# Patient Record
Sex: MALE | Race: BLACK OR AFRICAN AMERICAN | NOT HISPANIC OR LATINO | Employment: FULL TIME | URBAN - METROPOLITAN AREA
[De-identification: names, ages, dates, MRNs, and addresses within clinical notes are randomized per-mention and may not be internally consistent; named-entity substitution may affect disease eponyms.]

---

## 2022-12-16 ENCOUNTER — HOSPITAL ENCOUNTER (OUTPATIENT)
Dept: RADIOLOGY | Facility: HOSPITAL | Age: 52
Discharge: HOME | End: 2022-12-16
Attending: FAMILY MEDICINE

## 2022-12-16 ENCOUNTER — OFFICE VISIT (OUTPATIENT)
Dept: URGENT CARE | Facility: CLINIC | Age: 52
End: 2022-12-16

## 2022-12-16 VITALS
DIASTOLIC BLOOD PRESSURE: 83 MMHG | OXYGEN SATURATION: 100 % | RESPIRATION RATE: 20 BRPM | HEART RATE: 98 BPM | HEIGHT: 61 IN | SYSTOLIC BLOOD PRESSURE: 114 MMHG | TEMPERATURE: 99 F | WEIGHT: 150 LBS | BODY MASS INDEX: 28.32 KG/M2

## 2022-12-16 DIAGNOSIS — M25.562 ACUTE PAIN OF LEFT KNEE: Primary | ICD-10-CM

## 2022-12-16 DIAGNOSIS — M25.562 ACUTE PAIN OF LEFT KNEE: ICD-10-CM

## 2022-12-16 PROCEDURE — 63600175 PHARM REV CODE 636 W HCPCS: Performed by: FAMILY MEDICINE

## 2022-12-16 PROCEDURE — 99205 OFFICE O/P NEW HI 60 MIN: CPT | Mod: PBBFAC | Performed by: FAMILY MEDICINE

## 2022-12-16 PROCEDURE — 99204 PR OFFICE/OUTPT VISIT, NEW, LEVL IV, 45-59 MIN: ICD-10-PCS | Mod: S$PBB,,, | Performed by: FAMILY MEDICINE

## 2022-12-16 PROCEDURE — 99204 OFFICE O/P NEW MOD 45 MIN: CPT | Mod: S$PBB,,, | Performed by: FAMILY MEDICINE

## 2022-12-16 PROCEDURE — 73562 X-RAY EXAM OF KNEE 3: CPT | Mod: 26,LT,, | Performed by: RADIOLOGY

## 2022-12-16 PROCEDURE — 73562 XR KNEE 3 VIEW LEFT: ICD-10-PCS | Mod: 26,LT,, | Performed by: RADIOLOGY

## 2022-12-16 PROCEDURE — 73562 X-RAY EXAM OF KNEE 3: CPT | Mod: TC,LT

## 2022-12-16 RX ORDER — KETOROLAC TROMETHAMINE 30 MG/ML
60 INJECTION, SOLUTION INTRAMUSCULAR; INTRAVENOUS
Status: COMPLETED | OUTPATIENT
Start: 2022-12-16 | End: 2022-12-16

## 2022-12-16 RX ORDER — DICLOFENAC SODIUM 75 MG/1
75 TABLET, DELAYED RELEASE ORAL 2 TIMES DAILY
Qty: 30 TABLET | Refills: 1 | Status: SHIPPED | OUTPATIENT
Start: 2022-12-16 | End: 2023-06-15

## 2022-12-16 RX ADMIN — KETOROLAC TROMETHAMINE 60 MG: 60 INJECTION, SOLUTION INTRAMUSCULAR at 12:12

## 2022-12-16 NOTE — PROGRESS NOTES
"Subjective:       Patient ID: Flako Power is a 52 y.o. male.    Vitals:  height is 5' 1.02" (1.55 m) and weight is 68 kg (150 lb). His oral temperature is 98.8 °F (37.1 °C). His blood pressure is 114/83 and his pulse is 98. His respiration is 20 and oxygen saturation is 100%.     Chief Complaint: Injury (Entered by patient) and Knee Injury (States was working out and hurt knee x wed)    Injury    Knee Injury      52-year-old male presents urgent care with left knee pain after working out, mostly anterior.  Hurts to bend it.  No locking or popping.  Possible previous injury to the knee.    Denies fever or chills, no systemic symptoms.  No ENT or cardiorespiratory symptoms.  No other joints bother him.  ROS    Constitutional: negative except as stated in HPI  Eye: negative except as stated in HPI  ENT: negative except as stated in HPI  Respiratory: negative except as stated in HPI  Cardiovascular: negative except as stated in HPI  Gastrointestinal: negative except as stated in HPI  Genitourinary: negative except as stated in HPI  Objective:    Repeat temperature 98.8°  Physical Exam   Constitutional: He appears well-developed.  Non-toxic appearance. He does not appear ill. No distress.   Musculoskeletal:      Right hip: He exhibits tenderness (tenderness right upper buttock).      Left hip: Normal.      Right knee: Normal.      Left knee: He exhibits decreased range of motion. He exhibits no swelling, no effusion, no ecchymosis, no deformity, no erythema, normal alignment, normal patellar mobility and normal meniscus. Tenderness (pre patella bursa) found.      Right upper leg: Normal.      Left upper leg: Normal.      Left lower leg: Normal.   Nursing note and vitals reviewed.    XR left knee-no acute changes  Assessment:       1. Acute pain of left knee            Plan:         Acute pain of left knee  -     XR KNEE 3 VIEW LEFT; Future; Expected date: 12/16/2022  -     ketorolac injection 60 mg  -     " diclofenac (VOLTAREN) 75 MG EC tablet; Take 1 tablet (75 mg total) by mouth 2 (two) times daily.  Dispense: 30 tablet; Refill: 1         Discussed x-ray results.  Will notify if radiology interpretation is different.  Consider ice, rest and elevate as much as possible.  Toradol IM today.  May begin diclofenac tonight with food.  Follow-up with PCP or return to urgent care if need

## 2022-12-20 ENCOUNTER — HOSPITAL ENCOUNTER (EMERGENCY)
Facility: HOSPITAL | Age: 52
Discharge: HOME | End: 2022-12-20
Attending: EMERGENCY MEDICINE

## 2022-12-20 VITALS
RESPIRATION RATE: 16 BRPM | TEMPERATURE: 98 F | DIASTOLIC BLOOD PRESSURE: 81 MMHG | SYSTOLIC BLOOD PRESSURE: 126 MMHG | BODY MASS INDEX: 30.21 KG/M2 | HEART RATE: 93 BPM | HEIGHT: 61 IN | WEIGHT: 160 LBS | OXYGEN SATURATION: 100 %

## 2022-12-20 DIAGNOSIS — M25.562 LEFT KNEE PAIN, UNSPECIFIED CHRONICITY: ICD-10-CM

## 2022-12-20 DIAGNOSIS — S39.012A LUMBAR STRAIN, INITIAL ENCOUNTER: Primary | ICD-10-CM

## 2022-12-20 PROCEDURE — 99284 EMERGENCY DEPT VISIT MOD MDM: CPT

## 2022-12-20 PROCEDURE — 25000003 PHARM REV CODE 250: Performed by: NURSE PRACTITIONER

## 2022-12-20 RX ORDER — INDOMETHACIN 25 MG/1
25 CAPSULE ORAL 2 TIMES DAILY PRN
Qty: 14 CAPSULE | Refills: 0 | Status: SHIPPED | OUTPATIENT
Start: 2022-12-20 | End: 2022-12-27

## 2022-12-20 RX ORDER — BACLOFEN 10 MG/1
10 TABLET ORAL 3 TIMES DAILY PRN
Qty: 21 TABLET | Refills: 0 | Status: SHIPPED | OUTPATIENT
Start: 2022-12-20 | End: 2023-06-15

## 2022-12-20 RX ORDER — KETOROLAC TROMETHAMINE 10 MG/1
10 TABLET, FILM COATED ORAL
Status: COMPLETED | OUTPATIENT
Start: 2022-12-20 | End: 2022-12-20

## 2022-12-20 RX ORDER — TRAMADOL HYDROCHLORIDE 50 MG/1
50 TABLET ORAL EVERY 12 HOURS PRN
Qty: 10 TABLET | Refills: 0 | Status: SHIPPED | OUTPATIENT
Start: 2022-12-20 | End: 2022-12-25

## 2022-12-20 RX ORDER — METHOCARBAMOL 500 MG/1
1000 TABLET, FILM COATED ORAL
Status: COMPLETED | OUTPATIENT
Start: 2022-12-20 | End: 2022-12-20

## 2022-12-20 RX ADMIN — METHOCARBAMOL 1000 MG: 500 TABLET ORAL at 10:12

## 2022-12-20 RX ADMIN — KETOROLAC TROMETHAMINE 10 MG: 10 TABLET, FILM COATED ORAL at 10:12

## 2022-12-20 NOTE — ED PROVIDER NOTES
Encounter Date: 12/20/2022       History     Chief Complaint   Patient presents with    Back Pain    Knee Pain     CO LT KNEE AND BSCK PAIN X 1 WK.  DENIES INJURY.  SEEN IN  NO IMPROVEMENT W RX MEDS.       Pt is a 52 y.o. male who presents to the [facility] ED complaining of low back and knee pain x 1 week. Pt initially seen for issue on 12/16 at the [facility] but denies relief with prescribed medication. 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 Denies chest pain, SOB, weakness, dizziness, fever, or loss of bowel or bladder control. Says initial pain was in his knee but now he feels pain in his lower back also.    Review of patient's allergies indicates:  No Known Allergies  No past medical history on file.  Past Surgical History:   Procedure Laterality Date    TONSILLECTOMY       Family History   Problem Relation Age of Onset    Diabetes type I Mother     Breast cancer Mother     Hypertension Mother     No Known Problems Father      Social History     Tobacco Use    Smoking status: Never    Smokeless tobacco: Never   Substance Use Topics    Alcohol use: Never    Drug use: Never     Review of Systems   Constitutional:  Negative for chills, diaphoresis, fatigue and fever.   HENT:  Negative for facial swelling, postnasal drip, rhinorrhea, sinus pressure, sinus pain, sore throat and trouble swallowing.    Respiratory:  Negative for cough, chest tightness, shortness of breath and wheezing.    Cardiovascular:  Negative for chest pain, palpitations and leg swelling.   Gastrointestinal:  Negative for abdominal pain, diarrhea, nausea and vomiting.   Genitourinary:  Negative for dysuria, flank pain, hematuria and urgency.   Musculoskeletal:  Positive for arthralgias and back pain. Negative for myalgias.   Skin:  Negative for color change and rash.   Neurological:  Negative for dizziness, syncope, weakness and headaches.   Hematological:  Does not bruise/bleed easily.   All other systems  reviewed and are negative.    Physical Exam     Initial Vitals [12/20/22 1033]   BP Pulse Resp Temp SpO2   126/81 93 16 97.9 °F (36.6 °C) 100 %      MAP       --         Physical Exam    Nursing note and vitals reviewed.  Constitutional: Vital signs are normal. He appears well-developed and well-nourished.   HENT:   Head: Normocephalic.   Nose: Nose normal.   Mouth/Throat: Oropharynx is clear and moist.   Eyes: Conjunctivae and EOM are normal. Pupils are equal, round, and reactive to light.   Neck: Neck supple.   Normal range of motion.  Cardiovascular:  Normal rate, regular rhythm, normal heart sounds and intact distal pulses.           Pulmonary/Chest: Effort normal and breath sounds normal. No respiratory distress. He has no wheezes. He has no rhonchi. He has no rales. He exhibits no tenderness.   Abdominal: Abdomen is soft and flat. Bowel sounds are normal. There is no abdominal tenderness. There is no rebound, no guarding, no tenderness at McBurney's point and negative Wooten's sign.   Musculoskeletal:         General: Normal range of motion.      Cervical back: Normal range of motion and neck supple.      Lumbar back: Spasms and tenderness present. No swelling, edema or deformity. Normal range of motion.        Back:       Left knee: No swelling or effusion. Normal range of motion. Tenderness present. Normal pulse.        Legs:      Neurological: He is alert and oriented to person, place, and time. He has normal strength.   Skin: Skin is warm and dry. Capillary refill takes less than 2 seconds.   Psychiatric: He has a normal mood and affect. His behavior is normal. Judgment and thought content normal.       ED Course   Procedures  Labs Reviewed - No data to display       Imaging Results              X-Ray Lumbar Spine Ap And Lateral (Preliminary result)  Result time 12/20/22 11:43:21      ED Interpretation by JOSE Arce Jr. (12/20/22 11:43:21, [facility], Emergency Medicine)     Degenerative changes visualized. No acute fracture noted.                                     Medications   methocarbamoL tablet 1,000 mg (1,000 mg Oral Given 12/20/22 1056)   ketorolac tablet 10 mg (10 mg Oral Given 12/20/22 1056)     Medical Decision Making:   Differential Diagnosis:   Muscle strain  DDD  Fracture  Clinical Tests:   Radiological Study: Ordered and Reviewed  ED Management:  11:43 AM Reassessed patient at this time. Reports condition has improved. Discussed with patient all pertinent ED information and results. Discussed diagnosis and treatment plan with patient. Follow up instructions and return to ED instruction have been given. All questions and concerns were addressed at this time. Patient voices understanding of information and instructions. Patient is comfortable with plan and discharge. Patient is stable for discharge.                           Clinical Impression:   Final diagnoses:  [S39.012A] Lumbar strain, initial encounter (Primary)  [M25.562] Left knee pain, unspecified chronicity        ED Disposition Condition    Discharge Stable          ED Prescriptions       Medication Sig Dispense Start Date End Date Auth. Provider    baclofen (LIORESAL) 10 MG tablet Take 1 tablet (10 mg total) by mouth 3 (three) times daily as needed (muscle spasms). 21 tablet 12/20/2022 12/27/2022 Garfield Echeverria Jr., LEILAP    indomethacin (INDOCIN) 25 MG capsule Take 1 capsule (25 mg total) by mouth 2 (two) times daily as needed (pain). 14 capsule 12/20/2022 12/27/2022 LEILA Arce Jr.P    traMADoL (ULTRAM) 50 mg tablet Take 1 tablet (50 mg total) by mouth every 12 (twelve) hours as needed for Pain. 10 tablet 12/20/2022 12/25/2022 JOSE Arce Jr.          Follow-up Information       Follow up With Specialties Details Why Contact Info    [facility] Emergency Medicine In 3 days As needed, If symptoms worsen [address] [address] [phone]              Garfield Echeverria Jr., [facility]  12/20/22 1141

## 2022-12-20 NOTE — Clinical Note
Luanne Muniz accompanied their father to the emergency department on 12/20/2022. They may return to work on 12/21/2022.      If you have any questions or concerns, please don't hesitate to call.       RN

## 2022-12-20 NOTE — Clinical Note
"Flako Russsunil" Tono was seen and treated in our emergency department on 12/20/2022.  He may return to work on 12/24/2022.       If you have any questions or concerns, please don't hesitate to call.      Garfield Echeverria Jr., FNP"

## 2022-12-20 NOTE — Clinical Note
Joaquina Power accompanied their father to the emergency department on 12/20/2022. They may return to work on 12/21/2022.      If you have any questions or concerns, please don't hesitate to call.       RN

## 2023-01-16 ENCOUNTER — HOSPITAL ENCOUNTER (EMERGENCY)
Facility: HOSPITAL | Age: 53
Discharge: OTHER INSTITUTION | End: 2023-01-16
Attending: INTERNAL MEDICINE

## 2023-01-16 ENCOUNTER — HOSPITAL ENCOUNTER (INPATIENT)
Facility: HOSPITAL | Age: 53
LOS: 9 days | Discharge: HOME HEALTH | DRG: 871 | End: 2023-01-25
Attending: INTERNAL MEDICINE | Admitting: INTERNAL MEDICINE
Payer: MEDICAID

## 2023-01-16 VITALS
BODY MASS INDEX: 24.1 KG/M2 | OXYGEN SATURATION: 98 % | WEIGHT: 127.63 LBS | RESPIRATION RATE: 22 BRPM | HEART RATE: 87 BPM | TEMPERATURE: 99 F | HEIGHT: 61 IN | DIASTOLIC BLOOD PRESSURE: 87 MMHG | SYSTOLIC BLOOD PRESSURE: 145 MMHG

## 2023-01-16 DIAGNOSIS — I33.0 INFECTIVE ENDOCARDITIS OF MITRAL VALVE: ICD-10-CM

## 2023-01-16 DIAGNOSIS — R07.9 CHEST PAIN: ICD-10-CM

## 2023-01-16 DIAGNOSIS — M46.40 DISCITIS: ICD-10-CM

## 2023-01-16 DIAGNOSIS — I34.0 SEVERE MITRAL VALVE REGURGITATION: Primary | ICD-10-CM

## 2023-01-16 DIAGNOSIS — R78.81 BACTEREMIA: ICD-10-CM

## 2023-01-16 DIAGNOSIS — R05.9 COUGH: ICD-10-CM

## 2023-01-16 DIAGNOSIS — I25.10 CAD (CORONARY ARTERY DISEASE): ICD-10-CM

## 2023-01-16 DIAGNOSIS — I38 ENDOCARDITIS: ICD-10-CM

## 2023-01-16 DIAGNOSIS — I38 ENDOCARDITIS, UNSPECIFIED CHRONICITY, UNSPECIFIED ENDOCARDITIS TYPE: ICD-10-CM

## 2023-01-16 DIAGNOSIS — A41.9 SEPSIS, DUE TO UNSPECIFIED ORGANISM, UNSPECIFIED WHETHER ACUTE ORGAN DYSFUNCTION PRESENT: Primary | ICD-10-CM

## 2023-01-16 DIAGNOSIS — M46.24 OSTEOMYELITIS OF VERTEBRA OF THORACIC REGION: ICD-10-CM

## 2023-01-16 LAB
ALBUMIN SERPL-MCNC: 3.1 G/DL (ref 3.5–5)
ALBUMIN/GLOB SERPL: 0.6 RATIO (ref 1.1–2)
ALP SERPL-CCNC: 85 UNIT/L (ref 40–150)
ALT SERPL-CCNC: 31 UNIT/L (ref 0–55)
APPEARANCE UR: CLEAR
AST SERPL-CCNC: 25 UNIT/L (ref 5–34)
BACTERIA #/AREA URNS AUTO: ABNORMAL /HPF
BASOPHILS # BLD AUTO: 0.03 X10(3)/MCL (ref 0–0.2)
BASOPHILS NFR BLD AUTO: 0.2 %
BILIRUB UR QL STRIP.AUTO: NEGATIVE MG/DL
BILIRUBIN DIRECT+TOT PNL SERPL-MCNC: 0.6 MG/DL
BUN SERPL-MCNC: 11.8 MG/DL (ref 8.4–25.7)
CALCIUM SERPL-MCNC: 9.6 MG/DL (ref 8.4–10.2)
CHLORIDE SERPL-SCNC: 102 MMOL/L (ref 98–107)
CO2 SERPL-SCNC: 26 MMOL/L (ref 22–29)
COLOR UR AUTO: YELLOW
CREAT SERPL-MCNC: 1.19 MG/DL (ref 0.73–1.18)
CRP SERPL-MCNC: 89.8 MG/L
EOSINOPHIL # BLD AUTO: 0 X10(3)/MCL (ref 0–0.9)
EOSINOPHIL NFR BLD AUTO: 0 %
ERYTHROCYTE [DISTWIDTH] IN BLOOD BY AUTOMATED COUNT: 13.8 % (ref 11.5–17)
ERYTHROCYTE [SEDIMENTATION RATE] IN BLOOD: 63 MM/HR (ref 0–15)
FLUAV AG UPPER RESP QL IA.RAPID: NOT DETECTED
FLUBV AG UPPER RESP QL IA.RAPID: NOT DETECTED
GFR SERPLBLD CREATININE-BSD FMLA CKD-EPI: >60 MLS/MIN/1.73/M2
GLOBULIN SER-MCNC: 5.4 GM/DL (ref 2.4–3.5)
GLUCOSE SERPL-MCNC: 100 MG/DL (ref 74–100)
GLUCOSE UR QL STRIP.AUTO: NORMAL MG/DL
HCT VFR BLD AUTO: 34.4 % (ref 42–52)
HGB BLD-MCNC: 11.3 GM/DL (ref 14–18)
HYALINE CASTS #/AREA URNS LPF: ABNORMAL /LPF
IMM GRANULOCYTES # BLD AUTO: 0.03 X10(3)/MCL (ref 0–0.04)
IMM GRANULOCYTES NFR BLD AUTO: 0.2 %
KETONES UR QL STRIP.AUTO: ABNORMAL MG/DL
LACTATE SERPL-SCNC: 1.3 MMOL/L (ref 0.5–2.2)
LEUKOCYTE ESTERASE UR QL STRIP.AUTO: NEGATIVE UNIT/L
LYMPHOCYTES # BLD AUTO: 0.9 X10(3)/MCL (ref 0.6–4.6)
LYMPHOCYTES NFR BLD AUTO: 7.3 %
MCH RBC QN AUTO: 27.4 PG
MCHC RBC AUTO-ENTMCNC: 32.8 MG/DL (ref 33–36)
MCV RBC AUTO: 83.3 FL (ref 80–94)
MONOCYTES # BLD AUTO: 0.8 X10(3)/MCL (ref 0.1–1.3)
MONOCYTES NFR BLD AUTO: 6.5 %
MUCOUS THREADS URNS QL MICRO: ABNORMAL /LPF
NEUTROPHILS # BLD AUTO: 10.54 X10(3)/MCL (ref 2.1–9.2)
NEUTROPHILS NFR BLD AUTO: 85.8 %
NITRITE UR QL STRIP.AUTO: NEGATIVE
NRBC BLD AUTO-RTO: 0 %
PH UR STRIP.AUTO: 5.5 [PH]
PLATELET # BLD AUTO: 242 X10(3)/MCL (ref 130–400)
PMV BLD AUTO: 9.9 FL (ref 7.4–10.4)
POTASSIUM SERPL-SCNC: 4.2 MMOL/L (ref 3.5–5.1)
PROT SERPL-MCNC: 8.5 GM/DL (ref 6.4–8.3)
PROT UR QL STRIP.AUTO: ABNORMAL MG/DL
RBC # BLD AUTO: 4.13 X10(6)/MCL (ref 4.7–6.1)
RBC #/AREA URNS AUTO: ABNORMAL /HPF
RBC UR QL AUTO: ABNORMAL UNIT/L
SARS-COV-2 RNA RESP QL NAA+PROBE: NOT DETECTED
SODIUM SERPL-SCNC: 138 MMOL/L (ref 136–145)
SP GR UR STRIP.AUTO: 1.02
SQUAMOUS #/AREA URNS LPF: ABNORMAL /HPF
UROBILINOGEN UR STRIP-ACNC: NORMAL MG/DL
WBC # SPEC AUTO: 12.3 X10(3)/MCL (ref 4.5–11.5)
WBC #/AREA URNS AUTO: ABNORMAL /HPF

## 2023-01-16 PROCEDURE — 96367 TX/PROPH/DG ADDL SEQ IV INF: CPT

## 2023-01-16 PROCEDURE — 96365 THER/PROPH/DIAG IV INF INIT: CPT

## 2023-01-16 PROCEDURE — 87040 BLOOD CULTURE FOR BACTERIA: CPT | Performed by: INTERNAL MEDICINE

## 2023-01-16 PROCEDURE — 83605 ASSAY OF LACTIC ACID: CPT | Performed by: PHYSICIAN ASSISTANT

## 2023-01-16 PROCEDURE — 25500020 PHARM REV CODE 255: Performed by: INTERNAL MEDICINE

## 2023-01-16 PROCEDURE — 25000003 PHARM REV CODE 250: Performed by: PHYSICIAN ASSISTANT

## 2023-01-16 PROCEDURE — 80053 COMPREHEN METABOLIC PANEL: CPT | Performed by: PHYSICIAN ASSISTANT

## 2023-01-16 PROCEDURE — 99291 CRITICAL CARE FIRST HOUR: CPT | Mod: 25

## 2023-01-16 PROCEDURE — 0240U COVID/FLU A&B PCR: CPT | Performed by: PHYSICIAN ASSISTANT

## 2023-01-16 PROCEDURE — 25000003 PHARM REV CODE 250: Performed by: INTERNAL MEDICINE

## 2023-01-16 PROCEDURE — 11000001 HC ACUTE MED/SURG PRIVATE ROOM

## 2023-01-16 PROCEDURE — 85651 RBC SED RATE NONAUTOMATED: CPT | Performed by: PHYSICIAN ASSISTANT

## 2023-01-16 PROCEDURE — 63600175 PHARM REV CODE 636 W HCPCS: Performed by: INTERNAL MEDICINE

## 2023-01-16 PROCEDURE — 87077 CULTURE AEROBIC IDENTIFY: CPT | Performed by: INTERNAL MEDICINE

## 2023-01-16 PROCEDURE — 81001 URINALYSIS AUTO W/SCOPE: CPT | Performed by: PHYSICIAN ASSISTANT

## 2023-01-16 PROCEDURE — 86140 C-REACTIVE PROTEIN: CPT | Performed by: PHYSICIAN ASSISTANT

## 2023-01-16 PROCEDURE — 85025 COMPLETE CBC W/AUTO DIFF WBC: CPT | Performed by: PHYSICIAN ASSISTANT

## 2023-01-16 RX ORDER — SIMETHICONE 80 MG
1 TABLET,CHEWABLE ORAL 4 TIMES DAILY PRN
Status: DISCONTINUED | OUTPATIENT
Start: 2023-01-17 | End: 2023-01-25 | Stop reason: HOSPADM

## 2023-01-16 RX ORDER — ACETAMINOPHEN 500 MG
1000 TABLET ORAL
Status: COMPLETED | OUTPATIENT
Start: 2023-01-16 | End: 2023-01-16

## 2023-01-16 RX ORDER — PROCHLORPERAZINE EDISYLATE 5 MG/ML
5 INJECTION INTRAMUSCULAR; INTRAVENOUS EVERY 6 HOURS PRN
Status: DISCONTINUED | OUTPATIENT
Start: 2023-01-17 | End: 2023-01-25 | Stop reason: HOSPADM

## 2023-01-16 RX ORDER — ONDANSETRON 2 MG/ML
4 INJECTION INTRAMUSCULAR; INTRAVENOUS EVERY 4 HOURS PRN
Status: DISCONTINUED | OUTPATIENT
Start: 2023-01-17 | End: 2023-01-25 | Stop reason: HOSPADM

## 2023-01-16 RX ORDER — MORPHINE SULFATE 4 MG/ML
2 INJECTION, SOLUTION INTRAMUSCULAR; INTRAVENOUS EVERY 4 HOURS PRN
Status: DISCONTINUED | OUTPATIENT
Start: 2023-01-16 | End: 2023-01-25 | Stop reason: HOSPADM

## 2023-01-16 RX ORDER — ACETAMINOPHEN 325 MG/1
650 TABLET ORAL EVERY 4 HOURS PRN
Status: DISCONTINUED | OUTPATIENT
Start: 2023-01-17 | End: 2023-01-25 | Stop reason: HOSPADM

## 2023-01-16 RX ORDER — AMOXICILLIN 250 MG
2 CAPSULE ORAL 2 TIMES DAILY PRN
Status: DISCONTINUED | OUTPATIENT
Start: 2023-01-17 | End: 2023-01-25 | Stop reason: HOSPADM

## 2023-01-16 RX ORDER — HYDROCODONE BITARTRATE AND ACETAMINOPHEN 5; 325 MG/1; MG/1
1 TABLET ORAL EVERY 6 HOURS PRN
Status: DISCONTINUED | OUTPATIENT
Start: 2023-01-16 | End: 2023-01-25 | Stop reason: HOSPADM

## 2023-01-16 RX ORDER — METHOCARBAMOL 500 MG/1
500 TABLET, FILM COATED ORAL 4 TIMES DAILY PRN
Status: DISCONTINUED | OUTPATIENT
Start: 2023-01-17 | End: 2023-01-25 | Stop reason: HOSPADM

## 2023-01-16 RX ORDER — POLYETHYLENE GLYCOL 3350 17 G/17G
17 POWDER, FOR SOLUTION ORAL 2 TIMES DAILY PRN
Status: DISCONTINUED | OUTPATIENT
Start: 2023-01-17 | End: 2023-01-25 | Stop reason: HOSPADM

## 2023-01-16 RX ORDER — MAG HYDROX/ALUMINUM HYD/SIMETH 200-200-20
30 SUSPENSION, ORAL (FINAL DOSE FORM) ORAL 4 TIMES DAILY PRN
Status: DISCONTINUED | OUTPATIENT
Start: 2023-01-17 | End: 2023-01-25 | Stop reason: HOSPADM

## 2023-01-16 RX ORDER — TALC
6 POWDER (GRAM) TOPICAL NIGHTLY PRN
Status: DISCONTINUED | OUTPATIENT
Start: 2023-01-17 | End: 2023-01-25 | Stop reason: HOSPADM

## 2023-01-16 RX ORDER — METHOCARBAMOL 500 MG/1
500 TABLET, FILM COATED ORAL
Status: COMPLETED | OUTPATIENT
Start: 2023-01-16 | End: 2023-01-16

## 2023-01-16 RX ORDER — SODIUM CHLORIDE, SODIUM LACTATE, POTASSIUM CHLORIDE, CALCIUM CHLORIDE 600; 310; 30; 20 MG/100ML; MG/100ML; MG/100ML; MG/100ML
INJECTION, SOLUTION INTRAVENOUS CONTINUOUS
Status: DISCONTINUED | OUTPATIENT
Start: 2023-01-17 | End: 2023-01-19

## 2023-01-16 RX ORDER — ACETAMINOPHEN 500 MG
1000 TABLET ORAL EVERY 6 HOURS PRN
Status: DISCONTINUED | OUTPATIENT
Start: 2023-01-17 | End: 2023-01-25 | Stop reason: HOSPADM

## 2023-01-16 RX ORDER — SODIUM CHLORIDE 0.9 % (FLUSH) 0.9 %
10 SYRINGE (ML) INJECTION
Status: DISCONTINUED | OUTPATIENT
Start: 2023-01-17 | End: 2023-01-25 | Stop reason: HOSPADM

## 2023-01-16 RX ADMIN — CEFTRIAXONE SODIUM 2 G: 2 INJECTION, POWDER, FOR SOLUTION INTRAMUSCULAR; INTRAVENOUS at 02:01

## 2023-01-16 RX ADMIN — IOHEXOL 150 ML: 350 INJECTION, SOLUTION INTRAVENOUS at 03:01

## 2023-01-16 RX ADMIN — METHOCARBAMOL 500 MG: 500 TABLET ORAL at 11:01

## 2023-01-16 RX ADMIN — VANCOMYCIN HYDROCHLORIDE 1000 MG: 1 INJECTION, POWDER, LYOPHILIZED, FOR SOLUTION INTRAVENOUS at 04:01

## 2023-01-16 RX ADMIN — ACETAMINOPHEN 1000 MG: 500 TABLET ORAL at 11:01

## 2023-01-16 NOTE — Clinical Note
The catheter was inserted into the, was removed from the and was inserted over the wire into the ostium   left main. Hemodynamics were performed.  An angiography was performed of the left coronary arteries. The angiography was performed via power injection.

## 2023-01-16 NOTE — Clinical Note
The catheter was removed from the and was inserted over the wire into the. Hemodynamics were performed.  An angiography was performed of the right coronary arteries. The angiography was performed via power injection.

## 2023-01-16 NOTE — Clinical Note
The catheter was inserted into the, was removed from the and was inserted over the wire into the ostium   left main. Hemodynamics were performed.  An angiography was performed of the left coronary arteries. JL4 catheter removed and upsized sheath and catheters for better visualization

## 2023-01-16 NOTE — Clinical Note
Luanne Muniz accompanied their father to the emergency department on 1/16/2023. They may return to work on 01/17/2023.      If you have any questions or concerns, please don't hesitate to call.      Speedy Murillo RN

## 2023-01-16 NOTE — ED PROVIDER NOTES
Encounter Date: 1/16/2023       History     Chief Complaint   Patient presents with    Back Pain     Right mid and lower back pain worse with coughing x1 week. Febrile 102.7, denies SOB.     Flako Power is a 52 y.o. male who presents to the ED complaining of coughing and low back pain. Patient reports having right lower back pain with spasms for a while now, however he started coughing approximately 1 week ago and it has made his back pain much worse. He denies any falls, trauma, injury. No known sick contacts.    The history is provided by the patient. No  was used.   Review of patient's allergies indicates:  No Known Allergies  No past medical history on file.  No past surgical history on file.  No family history on file.     Review of Systems   Constitutional:  Positive for fever. Negative for activity change and chills.   HENT:  Negative for congestion and trouble swallowing.    Eyes:  Negative for photophobia and visual disturbance.   Respiratory:  Positive for cough. Negative for chest tightness, shortness of breath and wheezing.    Cardiovascular:  Negative for chest pain, palpitations and leg swelling.   Gastrointestinal:  Negative for abdominal pain, constipation, diarrhea, nausea and vomiting.   Genitourinary:  Negative for dysuria, frequency, hematuria and urgency.   Musculoskeletal:  Positive for back pain. Negative for arthralgias and gait problem.   Skin:  Negative for color change and rash.   Neurological:  Negative for dizziness, syncope, weakness, light-headedness, numbness and headaches.   Psychiatric/Behavioral:  Negative for agitation and confusion. The patient is not nervous/anxious.      Physical Exam     Initial Vitals [01/16/23 1029]   BP Pulse Resp Temp SpO2   (!) 163/92 (!) 112 18 (!) 102.7 °F (39.3 °C) 99 %      MAP       --         Physical Exam    Nursing note and vitals reviewed.  Constitutional: He appears well-developed and well-nourished. No distress.    HENT:   Head: Normocephalic and atraumatic.   Mouth/Throat: No oropharyngeal exudate.   Eyes: EOM are normal. No scleral icterus.   Neck: Neck supple.   Normal range of motion.  Cardiovascular:  Normal rate and regular rhythm.           Murmur heard.  Pulmonary/Chest: No respiratory distress. He has no wheezes.   Abdominal: Abdomen is soft. He exhibits no distension. There is no abdominal tenderness.   Musculoskeletal:         General: Tenderness (right lumbar paraspinal muscles) present. No edema. Normal range of motion.      Cervical back: Normal range of motion and neck supple.      Comments: No bony C/T/L spinal tenderness. Ambulating without difficulty.      Neurological: He is alert and oriented to person, place, and time. No cranial nerve deficit.   Skin: Skin is warm and dry. Capillary refill takes less than 2 seconds. No erythema.   Psychiatric: He has a normal mood and affect. Thought content normal.       ED Course   Critical Care    Date/Time: 1/16/2023 4:42 PM  Performed by: Nilesh Parish MD  Authorized by: Nilesh Parish MD   Direct patient critical care time: 12 minutes  Additional history critical care time: 5 minutes  Ordering / reviewing critical care time: 15 minutes  Documentation critical care time: 8 minutes  Consulting other physicians critical care time: 5 minutes  Total critical care time (exclusive of procedural time) : 45 minutes  Critical care was necessary to treat or prevent imminent or life-threatening deterioration of the following conditions: sepsis.  Critical care was time spent personally by me on the following activities: development of treatment plan with patient or surrogate, discussions with consultants, interpretation of cardiac output measurements, evaluation of patient's response to treatment, examination of patient, obtaining history from patient or surrogate, ordering and performing treatments and interventions, ordering and review of  laboratory studies, ordering and review of radiographic studies, pulse oximetry, re-evaluation of patient's condition and review of old charts.      Labs Reviewed   COMPREHENSIVE METABOLIC PANEL - Abnormal; Notable for the following components:       Result Value    Creatinine 1.19 (*)     Protein Total 8.5 (*)     Albumin Level 3.1 (*)     Globulin 5.4 (*)     Albumin/Globulin Ratio 0.6 (*)     All other components within normal limits   URINALYSIS, REFLEX TO URINE CULTURE - Abnormal; Notable for the following components:    Protein, UA Trace (*)     Ketones, UA 1+ (*)     Blood, UA Trace (*)     Mucous, UA Trace (*)     All other components within normal limits   CBC WITH DIFFERENTIAL - Abnormal; Notable for the following components:    WBC 12.3 (*)     RBC 4.13 (*)     Hgb 11.3 (*)     Hct 34.4 (*)     MCHC 32.8 (*)     Neut # 10.54 (*)     All other components within normal limits   SEDIMENTATION RATE, AUTOMATED - Abnormal; Notable for the following components:    Sed Rate 63 (*)     All other components within normal limits   C-REACTIVE PROTEIN - Abnormal; Notable for the following components:    C-Reactive Protein 89.80 (*)     All other components within normal limits   COVID/FLU A&B PCR - Normal    Narrative:     The Xpert Xpress SARS-CoV-2/FLU/RSV plus is a rapid, multiplexed real-time PCR test intended for the simultaneous qualitative detection and differentiation of SARS-CoV-2, Influenza A, Influenza B, and respiratory syncytial virus (RSV) viral RNA in either nasopharyngeal swab or nasal swab specimens.         LACTIC ACID, PLASMA - Normal   BLOOD CULTURE [facility]   BLOOD CULTURE [facility]   CBC W/ AUTO DIFFERENTIAL    Narrative:     The following orders were created for panel order CBC auto differential.  Procedure                               Abnormality         Status                     ---------                               -----------         ------                     CBC with Differential[[id number]]         Abnormal            Final result                 Please view results for these tests on the individual orders.          Imaging Results              CTA Chest Non-Coronary (PE Studies) (Final result)  Result time 01/16/23 16:12:56      Final result by Garfield Boswell MD (01/16/23 16:12:56)                   Impression:      1. No pulmonary embolus seen.  2. Irregularity of the T8 inferior endplate may relate to degenerative change but I cannot exclude discitis osteomyelitis.      Electronically signed by: Garfield Boswell  Date:    01/16/2023  Time:    16:12               Narrative:    EXAMINATION:  CTA CHEST NON CORONARY (PE STUDIES)    CLINICAL HISTORY:  Pulmonary embolism (PE) suspected, high prob;    TECHNIQUE:  Helical acquisition through the chest with IV contrast targeting the pulmonary arteries. Multiplanar and 3D MIP reconstructed images were provided for review.  mGycm. Automatic exposure control, adjustment of mA/kV or iterative reconstruction technique was used to reduce radiation.    COMPARISON:  None available.    FINDINGS:  There is good opacification of the pulmonary arterial tree. There is some motion artifact.  There is no convincing pulmonary embolus.  No aortic dissection.    There is no mediastinal, hilar or axillary lymphadenopathy by size criteria.    Heart is not significantly enlarged.  No pericardial effusion.    No pleural effusion.  No opacities suspicious for pneumonia.  Small nodule near the minor fissure on the right image 43 series 3 is of low suspicion.    There is no significant abnormality of the imaged upper abdomen.  There is irregularity of the T8 inferior endplate image 64 series 602.  Otherwise no acute osseous findings.                                       CT Lumbar Spine W Wo Contrast (Final result)  Result time 01/16/23 16:08:51      Final result by Alysa Underwood MD (01/16/23 16:08:51)                   Impression:      Degenerative changes and  spondylolisthesis seen at the level of L5-S1.  There is anterolisthesis of L5 on S1 that measures 7.4 mm.    Mild degenerative changes seen in the remainder of the lumbar spine      Electronically signed by: Alysa Underwood  Date:    01/16/2023  Time:    16:08               Narrative:    EXAMINATION:  CT LUMBAR SPINE W WO CONTRAST    CLINICAL HISTORY:  Low back pain, infection suspected;    TECHNIQUE:  Multiple axial images were obtained of the lumbar spine and sagittal and coronal reconstructions were performed.  Contrast was not administered.    Automatic exposure control (AEC) is utilized to reduce patient radiation exposure.    COMPARISON:  None    FINDINGS:  The vertebral body heights are well maintained.  There is anterolisthesis of L5 on S1..  Anterolisthesis measures 7.4 mm.  No evidence of acute fracture is seen.    At the level of L1-L2 there is a mild broad-based disc osteophyte complex seen.  There is mild foraminal stenosis bilaterally.  No significant facet arthropathy seen.    At L2-L3 no significant facet arthropathy seen.  There appears to be of the mild disc bulge which causes some mild foraminal stenosis bilaterally    At L3-L4 no significant facet arthropathy seen.  There is a small lateral osteophyte seen on the left side.  There is some mild left foraminal stenosis.    At L4-5 no significant facet arthropathy seen.  There appears to be a broad-based disc osteophyte complex which causes foraminal stenosis bilaterally.    At L5-S1 there is spondylolisthesis seen bilaterally with anterolisthesis of L5 on S1.  Severe facet arthropathy is seen bilaterally.  There is anterolisthesis of L5 on S1 that measures 7.4 mm    Incidentally noted is a cyst in the midpole the right kidney                                       X-Ray Chest AP Portable (Final result)  Result time 01/16/23 12:30:03      Final result by Garfield Boswell MD (01/16/23 12:30:03)                   Impression:      No acute  findings.      Electronically signed by: Garfield Boswell  Date:    01/16/2023  Time:    12:30               Narrative:    EXAMINATION:  XR CHEST AP PORTABLE    CLINICAL HISTORY:  Cough, unspecified    COMPARISON:  No priors    FINDINGS:  Portable frontal view of the chest was obtained. The heart is not enlarged.  Lungs are clear.  There is no pneumothorax or significant effusion.                                       Medications   vancomycin (VANCOCIN) 1,000 mg in sodium chloride 0.9% 250 mL IVPB (1,000 mg Intravenous New Bag 1/16/23 1600)   acetaminophen tablet 1,000 mg (1,000 mg Oral Given 1/16/23 1146)   methocarbamoL tablet 500 mg (500 mg Oral Given 1/16/23 1146)   cefTRIAXone (ROCEPHIN) 2 g in sodium chloride 0.9 % 50 mL IVPB (MB+) (0 g Intravenous Stopped 1/16/23 1527)   iohexoL (OMNIPAQUE 350) injection 150 mL (150 mLs Intravenous Given 1/16/23 1559)     Medical Decision Making:   History:   Old Medical Records: I decided to obtain old medical records.  Old Records Summarized: records from previous admission(s).       <> Summary of Records: Pt has duplicate medical record. On other chart pt was seen [facility] at [facility] ED for lower back pain. XR lumbar spine revealed grade 1 anterolisthesis of L5 on S1 and Mild disc space narrowing at L5-S1.             Attending Attestation:     Physician Attestation Statement for NP/PA:   I have conducted a face to face encounter with this patient in addition to the NP/PA, due to Medical Complexity    Other NP/PA Attestation Additions:    History of Present Illness: Patient presents with cough and mid/lower back pain for 1 week. States this is also related to cough. Pt denies sick contacts, injury, skin rash, or IV drug use.  Pt states evaluation in the past for his lower back pain which was attributed to muscle spasm and spondylolisthesis. Pt presents with fever and tachycardia.    Physical Exam: Febrile and tachycardic. No midline thoracic or lumbar spine tenderness or neurological  deficits. Heart murmur grade 3 noticed (Pt states no prior history)   Medical Decision Making: Pt with leukocytosis, fever, elevated CRP and sed rate with chronic back pain concerning for spinal abscess vs discitis osteomyelitis. CT performed with degenerative disease but a lesion at T8 endplate that could represent a focus of osteomyelitis. Due to no history of heart murmurs and the presence of this with fever an element of endocarditis with septic emboli to the spine could explain his current symptoms. Pt need an MRI and possible neurosurgery evaluation for this concern.  At this facility we does not have neurosurgery to consult for which will need to transfer. We started the patient in antibiotic therapy to cover endocarditis and osteomyelitis.  Pt stable and afebrile at this time for transfer           ED Course as of 01/16/23 1642   Mon Jan 16, 2023   1442 COVID, FLU negative. CXR without evidence of pneumonia. Pt febrile with WBC 12.3. ESR, CRP elevated. New murmur. Concern for infectious process including osteo of spine vs endocarditis. He denies IVDU. Will start rocephin and vanc. CTA chest and CT lumbar spine ordered for further eval. Discussed with Dr. Staton who is in agreement with plan.  [KD]   1600 Pt transitioned to Dr. Goldman at this time pending CT [KD]      ED Course User Index  [KD] Radha Diallo PA-C                 Clinical Impression:   Final diagnoses:  [R05.9] Cough  [A41.9] Sepsis, due to unspecified organism, unspecified whether acute organ dysfunction present (Primary)  [M46.24] Osteomyelitis of vertebra of thoracic region  [I38] Endocarditis, unspecified chronicity, unspecified endocarditis type        ED Disposition Condition    Transfer to Another Facility Stable                Nilesh Parish MD  01/16/23 1636       Nilesh Parish MD  01/16/23 1642

## 2023-01-17 LAB
AMPHET UR QL SCN: NEGATIVE
ANION GAP SERPL CALC-SCNC: 10 MEQ/L
APTT PPP: 33.3 SECONDS (ref 23.2–33.7)
BARBITURATE SCN PRESENT UR: NEGATIVE
BENZODIAZ UR QL SCN: NEGATIVE
BUN SERPL-MCNC: 12.5 MG/DL (ref 8.4–25.7)
C TRACH DNA SPEC QL NAA+PROBE: NOT DETECTED
CALCIUM SERPL-MCNC: 9.1 MG/DL (ref 8.4–10.2)
CANNABINOIDS UR QL SCN: NEGATIVE
CHLORIDE SERPL-SCNC: 103 MMOL/L (ref 98–107)
CO2 SERPL-SCNC: 24 MMOL/L (ref 22–29)
COCAINE UR QL SCN: NEGATIVE
CREAT SERPL-MCNC: 0.88 MG/DL (ref 0.73–1.18)
CREAT/UREA NIT SERPL: 14
ERYTHROCYTE [DISTWIDTH] IN BLOOD BY AUTOMATED COUNT: 13.9 % (ref 11.5–17)
EST. AVERAGE GLUCOSE BLD GHB EST-MCNC: 114 MG/DL
FENTANYL UR QL SCN: NEGATIVE
GFR SERPLBLD CREATININE-BSD FMLA CKD-EPI: >60 MLS/MIN/1.73/M2
GLUCOSE SERPL-MCNC: 89 MG/DL (ref 74–100)
HAV IGM SERPL QL IA: NONREACTIVE
HBA1C MFR BLD: 5.6 %
HBV CORE IGM SERPL QL IA: NONREACTIVE
HBV SURFACE AG SERPL QL IA: NONREACTIVE
HCT VFR BLD AUTO: 33.9 % (ref 42–52)
HCV AB SERPL QL IA: NONREACTIVE
HGB BLD-MCNC: 11.4 GM/DL (ref 14–18)
HIV 1+2 AB+HIV1 P24 AG SERPL QL IA: NONREACTIVE
INR BLD: 1.2 (ref 0–1.3)
MAGNESIUM SERPL-MCNC: 2 MG/DL (ref 1.6–2.6)
MCH RBC QN AUTO: 27.3 PG
MCHC RBC AUTO-ENTMCNC: 33.6 MG/DL (ref 33–36)
MCV RBC AUTO: 81.1 FL (ref 80–94)
MDMA UR QL SCN: NEGATIVE
MRSA PCR SCRN (OHS): NOT DETECTED
N GONORRHOEA DNA SPEC QL NAA+PROBE: NOT DETECTED
NRBC BLD AUTO-RTO: 0 %
OPIATES UR QL SCN: NEGATIVE
PCP UR QL: NEGATIVE
PH UR: 5.5 [PH] (ref 3–11)
PHOSPHATE SERPL-MCNC: 3.4 MG/DL (ref 2.3–4.7)
PLATELET # BLD AUTO: 238 X10(3)/MCL (ref 130–400)
PMV BLD AUTO: 11.4 FL (ref 7.4–10.4)
POTASSIUM SERPL-SCNC: 3.9 MMOL/L (ref 3.5–5.1)
PROTHROMBIN TIME: 15 SECONDS (ref 12.5–14.5)
RBC # BLD AUTO: 4.18 X10(6)/MCL (ref 4.7–6.1)
SODIUM SERPL-SCNC: 137 MMOL/L (ref 136–145)
SPECIFIC GRAVITY, URINE AUTO (.000) (OHS): 1.02 (ref 1–1.03)
T PALLIDUM AB SER QL: NONREACTIVE
WBC # SPEC AUTO: 7.9 X10(3)/MCL (ref 4.5–11.5)

## 2023-01-17 PROCEDURE — 80307 DRUG TEST PRSMV CHEM ANLYZR: CPT | Performed by: INTERNAL MEDICINE

## 2023-01-17 PROCEDURE — 25500020 PHARM REV CODE 255: Performed by: INTERNAL MEDICINE

## 2023-01-17 PROCEDURE — 85027 COMPLETE CBC AUTOMATED: CPT | Performed by: INTERNAL MEDICINE

## 2023-01-17 PROCEDURE — 11000001 HC ACUTE MED/SURG PRIVATE ROOM

## 2023-01-17 PROCEDURE — 80048 BASIC METABOLIC PNL TOTAL CA: CPT | Performed by: INTERNAL MEDICINE

## 2023-01-17 PROCEDURE — 63600175 PHARM REV CODE 636 W HCPCS: Performed by: INTERNAL MEDICINE

## 2023-01-17 PROCEDURE — 83036 HEMOGLOBIN GLYCOSYLATED A1C: CPT | Performed by: INTERNAL MEDICINE

## 2023-01-17 PROCEDURE — 83735 ASSAY OF MAGNESIUM: CPT | Performed by: INTERNAL MEDICINE

## 2023-01-17 PROCEDURE — 87641 MR-STAPH DNA AMP PROBE: CPT | Performed by: INTERNAL MEDICINE

## 2023-01-17 PROCEDURE — A9577 INJ MULTIHANCE: HCPCS | Performed by: INTERNAL MEDICINE

## 2023-01-17 PROCEDURE — 25000003 PHARM REV CODE 250: Performed by: INTERNAL MEDICINE

## 2023-01-17 PROCEDURE — 87591 N.GONORRHOEAE DNA AMP PROB: CPT | Performed by: INTERNAL MEDICINE

## 2023-01-17 PROCEDURE — 86780 TREPONEMA PALLIDUM: CPT | Performed by: INTERNAL MEDICINE

## 2023-01-17 PROCEDURE — 36415 COLL VENOUS BLD VENIPUNCTURE: CPT | Performed by: INTERNAL MEDICINE

## 2023-01-17 PROCEDURE — 85610 PROTHROMBIN TIME: CPT | Performed by: INTERNAL MEDICINE

## 2023-01-17 PROCEDURE — 80074 ACUTE HEPATITIS PANEL: CPT | Performed by: INTERNAL MEDICINE

## 2023-01-17 PROCEDURE — 87491 CHLMYD TRACH DNA AMP PROBE: CPT | Performed by: INTERNAL MEDICINE

## 2023-01-17 PROCEDURE — 84100 ASSAY OF PHOSPHORUS: CPT | Performed by: INTERNAL MEDICINE

## 2023-01-17 PROCEDURE — 85730 THROMBOPLASTIN TIME PARTIAL: CPT | Performed by: INTERNAL MEDICINE

## 2023-01-17 PROCEDURE — 87389 HIV-1 AG W/HIV-1&-2 AB AG IA: CPT | Performed by: INTERNAL MEDICINE

## 2023-01-17 RX ADMIN — GADOBENATE DIMEGLUMINE 15 ML: 529 INJECTION, SOLUTION INTRAVENOUS at 08:01

## 2023-01-17 RX ADMIN — SODIUM CHLORIDE, POTASSIUM CHLORIDE, SODIUM LACTATE AND CALCIUM CHLORIDE: 600; 310; 30; 20 INJECTION, SOLUTION INTRAVENOUS at 12:01

## 2023-01-17 RX ADMIN — VANCOMYCIN HYDROCHLORIDE 750 MG: 750 INJECTION, POWDER, LYOPHILIZED, FOR SOLUTION INTRAVENOUS at 03:01

## 2023-01-17 RX ADMIN — CEFEPIME 2 G: 2 INJECTION, POWDER, FOR SOLUTION INTRAVENOUS at 12:01

## 2023-01-17 RX ADMIN — CEFEPIME 2 G: 2 INJECTION, POWDER, FOR SOLUTION INTRAVENOUS at 03:01

## 2023-01-17 RX ADMIN — ONDANSETRON 4 MG: 2 INJECTION INTRAMUSCULAR; INTRAVENOUS at 12:01

## 2023-01-17 RX ADMIN — CEFEPIME 2 G: 2 INJECTION, POWDER, FOR SOLUTION INTRAVENOUS at 09:01

## 2023-01-17 RX ADMIN — METHOCARBAMOL 500 MG: 500 TABLET ORAL at 12:01

## 2023-01-17 NOTE — NURSING
..Nurses Note -- 4 Eyes      1/17/2023   3:20 AM      Skin assessed during: Admit      [x] No Pressure Injuries Present    []Prevention Measures Documented      [] Yes- Altered Skin Integrity Present or Discovered   [] LDA Added if Not in Epic (Describe Wound)   [] New Altered Skin Integrity was Present on Admit and Documented in LDA   [] Wound Image Taken    Wound Care Consulted? No    Attending Nurse:  Eloise Sweeney RN     Second RN/Staff Member:  Julianna Henderson RN

## 2023-01-17 NOTE — CONSULTS
[facility] - 8th Floor Med Surg  Neurosurgery  Consult Note    Inpatient consult to Neurosurgery  Consult performed by: ARNOL Davis  Consult ordered by: Ovidio Trivedi MD  Reason for consult: Possible T8 discitis vs osteomyelitis      Subjective:     Chief Complaint/Reason for Admission: Upper back pain with coughing    History of Present Illness: This is a 51yo male with no significant past medical history, who presented to [facility] ED on 01/16/2023 with complaint of back pain. His symptoms initially started in mid December 2022 with left knee pain and was seen at an urgent care and had left knees x-rays that were unremarkable. He was prescribed NSAIDs and subsequently started having back pain. He presented to [facility] ED on 12/20/2022 and was diagnosed with lumbosacral strain and prescribed NSAID, tramadol and baclofen.  His mid lower back pain continued to get worse and over the past week started having nonproductive cough upon waking from sleep in the morning and had noticed that whenever he coughs his back pain worsens and radiates bilaterally to his abdomen. He denies loss of bowel or bladder control or lower extremity weakness.     Denies shortness breath, chest pain, fever or chills until he arrived to [facility] ED today on 1/6/2023 where he found to be febrile 102.7, tachycardic and hypertensive.  His labs notable for WBC 12.3, ESR 63, CRP 89.  COVID 19 and flu negative. CTA chest show no pulmonary embolus or parenchymal lung disease, irregularity of T8 inferior endplate may relate to degenerative change but cannot exclude discitis/osteomyelitis.  CT lumbar spine showed degenerative changes and spondylolisthesis at L5-S1. He was given IV fluid, ceftriaxone and vancomycin and transferred to [facility] on 1/16/23 and referred to hospital medicine service for further evaluation and management.    Dr. Davis has been consulted for evaluation of his back pain and T spine findings.    On PE this am patient is  sitting up in bed, NAD. He states his back pain has improved since admit. His pain is located in his mid T spine and mainly occurs with coughing. He has just undergone an US of his heart and tech reports vegetation on his mitral valve with mitral and tricuspid valve regurgitation. Temp max 102.7 since admit. +productive cough. WBC currently 7.9. Blood cultures positive for G+ cocci.    No medications prior to admission.     Review of patient's allergies indicates:  No Known Allergies    No past medical history on file.  No past surgical history on file.  Family History    None       Tobacco Use    Smoking status: Not on file    Smokeless tobacco: Not on file   Substance and Sexual Activity    Alcohol use: Not on file    Drug use: Not on file    Sexual activity: Not on file     Review of Systems  Objective:   12 pt ROS WNL, except for HPI    Weight: 57.6 kg (127 lb)  Body mass index is 25.65 kg/m².  Vital Signs (Most Recent):  Temp: 98 °F (36.7 °C) (01/17/23 0742)  Pulse: 87 (01/17/23 0742)  Resp: (!) 22 (01/17/23 0114)  BP: (!) 147/86 (01/17/23 0742)  SpO2: 98 % (01/17/23 0742)   Vital Signs (24h Range):  Temp:  [98 °F (36.7 °C)-102.7 °F (39.3 °C)] 98 °F (36.7 °C)  Pulse:  [] 87  Resp:  [16-27] 22  SpO2:  [96 %-99 %] 98 %  BP: (111-163)/(77-93) 147/86       Physical Exam:    Constitutional: He appears well-developed and well-nourished. No distress.     Eyes: Pupils are equal, round, and reactive to light. EOM are normal.     Cardiovascular: Intact distal pulses.     Abdominal: Soft. Bowel sounds are normal.     Skin: Skin displays no rash on trunk and no rash on extremities.     Psych/Behavior: He is alert. He is oriented to person, place, and time. He has a normal mood and affect.     Musculoskeletal:        Neck: Range of motion is full.        Back: Range of motion is full. There is tenderness. Tenderness is located Mildly tender in mid thoracic spine.        Right Upper Extremities: Range of motion is  full. Muscle strength is 5/5.        Left Upper Extremities: Range of motion is full. Muscle strength is 5/5.       Right Lower Extremities: Range of motion is full. Muscle strength is 5/5.        Left Lower Extremities: Range of motion is full. Muscle strength is 5/5.     Neurological:        Cranial nerves: Cranial nerve(s) II, III, IV, V, VI, VII, VIII, IX, X, XI and XII are intact.     Significant Labs:  Recent Labs   Lab 01/16/23  1315 01/17/23  0405    137   K 4.2 3.9   CO2 26 24   BUN 11.8 12.5   CREATININE 1.19* 0.88   CALCIUM 9.6 9.1   MG  --  2.00     Recent Labs   Lab 01/16/23  1315 01/17/23  0405   WBC 12.3* 7.9   HGB 11.3* 11.4*   HCT 34.4* 33.9*    238     Recent Labs   Lab 01/17/23  0404   INR 1.20     Microbiology Results (last 7 days)       Procedure Component Value Units Date/Time    Chlamydia/GC, PCR [[id number]] Collected: 01/17/23 1002    Order Status: Sent Specimen: Urine Updated: 01/17/23 1002            Significant Diagnostics:  EXAMINATION:  CTA CHEST NON CORONARY (PE STUDIES)     CLINICAL HISTORY:  Pulmonary embolism (PE) suspected, high prob;     TECHNIQUE:  Helical acquisition through the chest with IV contrast targeting the pulmonary arteries. Multiplanar and 3D MIP reconstructed images were provided for review.  mGycm. Automatic exposure control, adjustment of mA/kV or iterative reconstruction technique was used to reduce radiation.     COMPARISON:  None available.     FINDINGS:  There is good opacification of the pulmonary arterial tree. There is some motion artifact.  There is no convincing pulmonary embolus.  No aortic dissection.     There is no mediastinal, hilar or axillary lymphadenopathy by size criteria.     Heart is not significantly enlarged.  No pericardial effusion.     No pleural effusion.  No opacities suspicious for pneumonia.  Small nodule near the minor fissure on the right image 43 series 3 is of low suspicion.     There is no significant  abnormality of the imaged upper abdomen.  There is irregularity of the T8 inferior endplate image 64 series 602.  Otherwise no acute osseous findings.     Impression:     1. No pulmonary embolus seen.  2. Irregularity of the T8 inferior endplate may relate to degenerative change but I cannot exclude discitis osteomyelitis.    Assessment/Plan:     His back pain is currently controlled. He denies LE radicular complaints or weakness.  CT of chest noted, results as above  We have ordered a MRI of his T spine to further assess  Echo results noted  ID and cardiology consults pending  No plans for neurosurgical intervention at this time  Further recs to follow once MRI complete    Thank you for your consult. I will follow-up with patient. Please contact us if you have any additional questions.    ARNOL Davis  Neurosurgery  [facility] - 8th Floor Med Surg

## 2023-01-17 NOTE — PROGRESS NOTES
Pharmacokinetic Initial Assessment: IV Vancomycin    Assessment/Plan:    Initiate intravenous vancomycin with loading dose of 1000 mg once followed by a maintenance dose of vancomycin 750 mg IV every 12 hours  Desired empiric serum trough concentration is 15 to 20 mcg/mL  Draw vancomycin trough level 60 min prior to fourth dose on 01/18 at approximately 0200  Pharmacy will continue to follow and monitor vancomycin.      Please contact pharmacy at extension [phone] with any questions regarding this assessment.     Thank you for the consult,   Zelalem Mooney       Patient brief summary:  Flako Power is a 52 y.o. male initiated on antimicrobial therapy with IV Vancomycin for treatment of suspected bone/joint infection    Drug Allergies:   Review of patient's allergies indicates:  No Known Allergies    Actual Body Weight:   57.6 kg    Renal Function:   Estimated Creatinine Clearance: 53.1 mL/min (A) (based on SCr of 1.19 mg/dL (H)).,     Dialysis Method (if applicable):  N/A    CBC (last 72 hours):  Recent Labs   Lab Result Units 01/16/23  1315   WBC x10(3)/mcL 12.3*   Hgb gm/dL 11.3*   Hct % 34.4*   Platelet x10(3)/mcL 242   Mono % % 6.5   Eos % % 0.0   Basophil % % 0.2       Metabolic Panel (last 72 hours):  Recent Labs   Lab Result Units 01/16/23  1311 01/16/23  1315   Sodium Level mmol/L  --  138   Potassium Level mmol/L  --  4.2   Chloride mmol/L  --  102   Carbon Dioxide mmol/L  --  26   Glucose Level mg/dL  --  100   Glucose, UA mg/dL Normal  --    Blood Urea Nitrogen mg/dL  --  11.8   Creatinine mg/dL  --  1.19*   Albumin Level g/dL  --  3.1*   Bilirubin Total mg/dL  --  0.6   Alkaline Phosphatase unit/L  --  85   Aspartate Aminotransferase unit/L  --  25   Alanine Aminotransferase unit/L  --  31       Drug levels (last 3 results):  No results for input(s): VANCOMYCINRA, VANCORANDOM, VANCOMYCINPE, VANCOPEAK, VANCOMYCINTR, VANCOTROUGH in the last 72 hours.    Microbiologic Results:  Microbiology Results (last 7  days)       ** No results found for the last 168 hours. **

## 2023-01-17 NOTE — PROGRESS NOTES
[facility]  Hospital Medicine Progress Note        Chief Complaint: Inpatient Follow-up for T spine diskitis     HPI:   This is a 52-year-old male  with no significant past medical history  presented to [facility] ED  on 01/16/2023 with complaint of back pain. His  symptoms initially started in mid December 2022  with left knee pain and was seen at  at an urgent care and had left knees x-rays that were unremarkable and was prescribed NSAID,  subsequently started having back pain  and visited [facility] ED on 12/20/2022 and was diagnosed with lumbosacral strain and prescribed NSAID, tramadol and baclofen.  His mid lower back pain continued to get worse and over the past week started having nonproductive cough upon waking from sleep in the morning and had noticed that whenever he coughs his back pain worsens and radiates bilaterally to his abdomen.  Denies loss of bowel or bladder control or lower extremity weakness. Denies shortness breath, chest pain, fever or chills until he arrived to [facility] ED today on 1/6/2023 where he found to be febrile 102.7, tachycardic and hypertensive.  His labs notable for WBC 12.3, ESR 63, CRP 89.  COVID 19 and flu negative. CTA chest show no pulmonary embolus or parenchymal lung disease, irregularity of T8 inferior endplate may relate to degenerative change but cannot exclude discitis/osteomyelitis.  CT lumbar spine showed degenerative changes and spondylolisthesis at L5-S1. He was given IV fluid, ceftriaxone and vancomycin and  transferred to [facility] on 1/16/23 and referred to hospital medicine service for further evaluation and management.    Interval Hx:   Patient today awake and comfortable. Denies any fever, chills or chest pain. Denies any recent procedures or h/o IVDA. Scheduled for LAXMI in am.     Objective/physical exam:  General: In no acute distress, afebrile  Chest: Clear to auscultation bilaterally  Heart: RRR, +S1, S2, Loud systolic murmur   Abdomen: Soft,  nontender, BS +  MSK: Warm, no lower extremity edema, no clubbing or cyanosis  Neurologic: Alert and oriented x4, Cranial nerve II-XII intact, Strength 5/5 in all 4 extremities    VITAL SIGNS: 24 HRS MIN & MAX LAST   Temp  Min: 98 °F (36.7 °C)  Max: 99 °F (37.2 °C) 98.4 °F (36.9 °C)   BP  Min: 111/79  Max: 147/86 138/82   Pulse  Min: 69  Max: 97  82   Resp  Min: 20  Max: 27 20   SpO2  Min: 96 %  Max: 99 % 99 %       Recent Labs   Lab 01/16/23  1315 01/17/23  0405   WBC 12.3* 7.9   RBC 4.13* 4.18*   HGB 11.3* 11.4*   HCT 34.4* 33.9*   MCV 83.3 81.1   MCH 27.4 27.3   MCHC 32.8* 33.6   RDW 13.8 13.9    238   MPV 9.9 11.4*       Recent Labs   Lab 01/16/23  1315 01/17/23  0405    137   K 4.2 3.9   CO2 26 24   BUN 11.8 12.5   CREATININE 1.19* 0.88   CALCIUM 9.6 9.1   MG  --  2.00   ALBUMIN 3.1*  --    ALKPHOS 85  --    ALT 31  --    AST 25  --    BILITOT 0.6  --           Microbiology Results (last 7 days)       Procedure Component Value Units Date/Time    Chlamydia/GC, PCR [[id number]]  (Normal) Collected: 01/17/23 1002    Order Status: Completed Specimen: Urine Updated: 01/17/23 1207     Chlamydia trachomatis PCR Not Detected     N. gonorrhea PCR Not Detected    Narrative:      The Xpert CT/NG test, performed on the GeneXpert system is a qualitative in vitro real-time polymerase chain reaction (PCR) test for the automated detected and differentiation for genomic DNA from Chlamydia trachomatis (CT) and/or Neisseria gonorrhoeae (NG).             See below for Radiology    Scheduled Med:   ceFEPime (MAXIPIME) IVPB  2 g Intravenous Q8H    vancomycin (VANCOCIN) IVPB  750 mg Intravenous Q12H        Continuous Infusions:   lactated ringers 125 mL/hr at 01/17/23 0036        PRN Meds:  acetaminophen, acetaminophen, aluminum-magnesium hydroxide-simethicone, HYDROcodone-acetaminophen, melatonin, methocarbamoL, morphine, ondansetron, polyethylene glycol, prochlorperazine, senna-docusate 8.6-50 mg, simethicone, sodium  chloride 0.9%, Pharmacy to dose Vancomycin consult **AND** vancomycin - pharmacy to dose       Assessment/Plan:  Sepsis   T spine diskitis/Osteomyelitis   ? Infective endocarditis, New loud systolic murmur   Anemia, NC/NC    Plan:  Patient doing well. Has no fever or chills  Denies any recent procedures, trauma or h/o IVDA  Blood cultures positive x 2 for Strep   Cont IV Vancomycin, f/u by ID team   LAXMI in am   Cont supportive care   Will closely monitor patients daily weight, urine out put, renal parameters and volume status    Seen by neurosurgery team and recommended no surgery now       Critical care note:  Critical care diagnosis: Sepsis needing iv antibiotics   Critical care interventions: Hands-on evaluation, review of labs/radiographs/records and discussion with patient and family if present  Critical care time spent: 35 minutes     VTE prophylaxis: Lovenox     Patient condition:  Guarded    Anticipated discharge and Disposition:         All diagnosis and differential diagnosis have been reviewed; assessment and plan has been documented; I have personally reviewed the labs and test results that are presently available; I have reviewed the patients medication list; I have reviewed the consulting providers response and recommendations. I have reviewed or attempted to review medical records based upon their availability    All of the patient's questions have been  addressed and answered. Patient's is agreeable to the above stated plan. I will continue to monitor closely and make adjustments to medical management as needed.  _____________________________________________________________________    Nutrition Status:    Radiology:  MRI Thoracic Spine W WO Cont  Narrative: EXAMINATION:  MRI THORACIC SPINE W WO CONTRAST    CLINICAL HISTORY:  Osteomyelitis, thoracic;    TECHNIQUE:  Multiplanar multisequence MR images of the thoracic spine are obtained with and without contrast.    COMPARISON:  CT chest dated  01/16/2023    FINDINGS:  Thoracic alignment is preserved.  There is edema and enhancement along the inferior endplate of T8, with associated disc edema, concerning for discitis/osteomyelitis.  The bone marrow is otherwise normal in signal.  The vertebral body heights are otherwise preserved.    The spinal cord is normal in signal.  There is no abnormal intrathecal or epidural enhancement.  There is no epidural fluid collection.    There are multilevel degenerative changes with small disc bulges and facet hypertrophy.  The spinal canal and neural foramina are patent.    There is very mild prevertebral soft tissue enhancement at T8-T9.  There is no drainable fluid collection identified.  Impression: 1. Findings concerning for discitis/osteomyelitis at T8-T9.  New epidural or paraspinal fluid collection.  2. No significant spinal canal or neural foraminal stenosis.    Electronically signed by: Jeanna Avila  Date:    01/17/2023  Time:    12:21      Cash Martinez MD   01/17/2023

## 2023-01-17 NOTE — H&P
[facility] Hospital Medicine - H&P Note    Patient Name: Flako Power  : 1970  MRN: [id number]  PCP: No primary care provider on file.  Admitting Physician: Ovidio Trivedi MD  Admission Class: IP- Inpatient   Length of Stay: 0  Face-to-Face encounter date: 2023  Code status: Full    Chief Complaint   Transfer from [facility] ED for possible thoracic discitis    History of Present Illness   This is a 52-year-old male  with no significant past medical history  presented to [facility] ED  on 2023 with complaint of back pain. His  symptoms initially started in mid 2022  with left knee pain and was seen at  at an urgent care and had left knees x-rays that were unremarkable and was prescribed NSAID,  subsequently started having back pain  and visited [facility] ED on 2022 and was diagnosed with lumbosacral strain and prescribed NSAID, tramadol and baclofen.  His mid lower back pain continued to get worse and over the past week started having nonproductive cough upon waking from sleep in the morning and had noticed that whenever he coughs his back pain worsens and radiates bilaterally to his abdomen.  Denies loss of bowel or bladder control or lower extremity weakness. Denies shortness breath, chest pain, fever or chills until he arrived to [facility] ED today on 2023 where he found to be febrile 102.7, tachycardic and hypertensive.  His labs notable for WBC 12.3, ESR 63, CRP 89.  COVID 19 and flu negative. CTA chest show no pulmonary embolus or parenchymal lung disease, irregularity of T8 inferior endplate may relate to degenerative change but cannot exclude discitis/osteomyelitis.  CT lumbar spine showed degenerative changes and spondylolisthesis at L5-S1. He was given IV fluid, ceftriaxone and vancomycin and  transferred to [facility] on 23 and referred to hospital medicine service for further evaluation and management.    ROS   Except as documented, all other systems reviewed  and negative     Past Medical History    Denies any past medical history    Past Surgical History    Denies any past surgical history    Social History    Denies smoking, alcohol or illicit drug or prior history of STDs/HIV    Family History   Reviewed and negative    Allergies   Patient has no known allergies.    Home Medications    No regular medication aside from recently prescribed NSAIDs, baclofen and tramadol    Physical Exam   Vital Signs  Temp:  [98.4 °F (36.9 °C)-99 °F (37.2 °C)]   Pulse:  [69-97]   Resp:  [16-27]   BP: (111-155)/(77-93)   SpO2:  [98 %-99 %]    General: Appears comfortable  HEENT: NC/AT  Neck:  No JVD  Chest: CTABL  CVS: Regular rhythm. Normal S1/S2. Pan systolic murmur  Abdomen: nondistended, normoactive BS, soft and non-tender.  MSK:  mid back spinal and paraspinal tenderness,  negative SLR bilaterally  Skin: Warm and dry  Neuro: AAOx3, no focal neurological deficit  Psych: Cooperative    Labs     Recent Labs     01/16/23  1315 01/16/23  1316   WBC 12.3*  --    RBC 4.13*  --    HGB 11.3*  --    HCT 34.4*  --    MCV 83.3  --    MCH 27.4  --    MCHC 32.8*  --    RDW 13.8  --      --    SEDRATE  --  63*        Recent Labs     01/16/23  1315      K 4.2   CHLORIDE 102   CO2 26   BUN 11.8   CREATININE 1.19*   EGFRNORACEVR >60   GLUCOSE 100   CALCIUM 9.6   ALBUMIN 3.1*   GLOBULIN 5.4*   ALKPHOS 85   ALT 31   AST 25   BILITOT 0.6     Recent Labs     01/16/23  1315   LACTIC 1.3            Imaging     MRI Thoracic Spine W WO Cont    (Results Pending)     Assessment & Plan   Sepsis  Suspected thoracic T8 diskitis/osteomyelitis  Systolic murmur - concern for endocarditis  Normocytic anemia    Plan:    Blood culture x2 -  obtained at [facility],  check MRSA PCR  Continue IV vancomycin, start cefepime 2g Q8H  MRI thoracic spine  with and without contrast  TTE if unrevealing might need LAXMI  Check HIV, hepatitis panel, syphilis and urine G/C PCR  PRN  analgesics  Neurosurgery consult  VTE  Prophylaxis:  SCDs,  holding pharmacological prophylaxis pending neurosurgical evaluation    Critical care time: 35 minutes  Critical care diagnosis: sepsis    Ovidio Trivedi MD  Internal Medicine

## 2023-01-17 NOTE — CONSULTS
Inpatient consult to Cardiology  Consult performed by: JOSE Vazquez  Consult ordered by: Ovidio Trivedi MD  Reason for consult: LAXMI    [facility]  Cardiology  Consult Note    Patient Name: Flako Power  MRN: [id number]  Admission Date: 1/16/2023  Hospital Length of Stay: 1 days  Code Status: Full Code   Attending Provider: Ovidio Trivedi MD   Consulting Provider: JOSE Vazquez  Primary Care Physician: Primary Doctor No  Principal Problem:<principal problem not specified>    Patient information was obtained from patient, past medical records, and ER records.     Subjective:     Chief Complaint:  Reason for consult: LAXMI     HPI:   Mr. Power is a 52 year old male who is unknown to [facility]. He presents to [facility] ED on 1.16.23 with complaints of back pain that began mid December 2022 and left knee pain. He reports that the pain beacme progressively worse that began to radiate bilaterally to his abdomen with an accompanying nonproductive cough. He denies CP, SOB, palps, fever, or chills. On arrival to the ER, he was found to be febrile (102.7 F), tachycardic, & hypertensive. Sginificant labs include WBC 12.3, ESR 63, CRP 89. CTA chest demonstrated an irregularity of T8 inferior endplate that may relate to degenerative changes but cannot exclude discitis/osteomyelitis. His initial blood cultures are positive. [facility] has been consulted to perform a LAXMI to further evaluate for possible MV endocarditis.     PMH: Denies  PSH: Denies  Family History: Noncontributory   Social History: Denies alcohol, tobacco, or illicit drug use.     Previous Cardiac Diagnostics:   None to review.     Review of Systems   Respiratory:  Negative for shortness of breath.    Cardiovascular:  Negative for chest pain and palpitations.   Musculoskeletal:  Positive for back pain.   All other systems reviewed and are negative.    Objective:     Vital Signs (Most Recent):  Temp: 98.4 °F (36.9 °C) (01/17/23  1106)  Pulse: 82 (01/17/23 1106)  Resp: 20 (01/17/23 1106)  BP: 138/82 (01/17/23 1106)  SpO2: 99 % (01/17/23 1106) Vital Signs (24h Range):  Temp:  [98 °F (36.7 °C)-99 °F (37.2 °C)] 98.4 °F (36.9 °C)  Pulse:  [69-97] 82  Resp:  [16-27] 20  SpO2:  [96 %-99 %] 99 %  BP: (111-155)/(77-93) 138/82     Weight: 57.6 kg (127 lb)  Body mass index is 25.65 kg/m².    SpO2: 99 %         Intake/Output Summary (Last 24 hours) at 1/17/2023 1324  Last data filed at 1/17/2023 0658  Gross per 24 hour   Intake 1095.83 ml   Output 300 ml   Net 795.83 ml       Lines/Drains/Airways       Peripheral Intravenous Line  Duration                  Peripheral IV - Single Lumen 01/16/23 2330 Posterior;Right Wrist <1 day                    Significant Labs:  Recent Results (from the past 72 hour(s))   COVID/FLU A&B PCR    Collection Time: 01/16/23 11:46 AM   Result Value Ref Range    Influenza A PCR Not Detected Not Detected    Influenza B PCR Not Detected Not Detected    SARS-CoV-2 PCR Not Detected Not Detected, Negative, Invalid   Urinalysis, Reflex to Urine Culture Urine, Clean Catch    Collection Time: 01/16/23  1:11 PM    Specimen: Urine   Result Value Ref Range    Color, UA Yellow Yellow, Light-Yellow, Dark Yellow, Ashlie, Straw    Appearance, UA Clear Clear    Specific Gravity, UA 1.018     pH, UA 5.5 5.0 - 8.5    Protein, UA Trace (A) Negative mg/dL    Glucose, UA Normal Negative, Normal mg/dL    Ketones, UA 1+ (A) Negative mg/dL    Blood, UA Trace (A) Negative unit/L    Bilirubin, UA Negative Negative mg/dL    Urobilinogen, UA Normal 0.2, 1.0, Normal mg/dL    Nitrites, UA Negative Negative    Leukocyte Esterase, UA Negative Negative unit/L    WBC, UA 0-5 None Seen, 0-2, 3-5, 0-5 /HPF    Bacteria, UA None Seen None Seen /HPF    Squamous Epithelial Cells, UA None Seen None Seen /HPF    Mucous, UA Trace (A) None Seen /LPF    Hyaline Casts, UA None Seen None Seen /lpf    RBC, UA 0-5 None Seen, 0-2, 3-5, 0-5 /HPF   Comprehensive metabolic  panel    Collection Time: 01/16/23  1:15 PM   Result Value Ref Range    Sodium Level 138 136 - 145 mmol/L    Potassium Level 4.2 3.5 - 5.1 mmol/L    Chloride 102 98 - 107 mmol/L    Carbon Dioxide 26 22 - 29 mmol/L    Glucose Level 100 74 - 100 mg/dL    Blood Urea Nitrogen 11.8 8.4 - 25.7 mg/dL    Creatinine 1.19 (H) 0.73 - 1.18 mg/dL    Calcium Level Total 9.6 8.4 - 10.2 mg/dL    Protein Total 8.5 (H) 6.4 - 8.3 gm/dL    Albumin Level 3.1 (L) 3.5 - 5.0 g/dL    Globulin 5.4 (H) 2.4 - 3.5 gm/dL    Albumin/Globulin Ratio 0.6 (L) 1.1 - 2.0 ratio    Bilirubin Total 0.6 <=1.5 mg/dL    Alkaline Phosphatase 85 40 - 150 unit/L    Alanine Aminotransferase 31 0 - 55 unit/L    Aspartate Aminotransferase 25 5 - 34 unit/L    eGFR >60 mls/min/1.73/m2   Lactic acid, plasma    Collection Time: 01/16/23  1:15 PM   Result Value Ref Range    Lactic Acid Level 1.3 0.5 - 2.2 mmol/L   CBC with Differential    Collection Time: 01/16/23  1:15 PM   Result Value Ref Range    WBC 12.3 (H) 4.5 - 11.5 x10(3)/mcL    RBC 4.13 (L) 4.70 - 6.10 x10(6)/mcL    Hgb 11.3 (L) 14.0 - 18.0 gm/dL    Hct 34.4 (L) 42.0 - 52.0 %    MCV 83.3 80.0 - 94.0 fL    MCH 27.4 pg    MCHC 32.8 (L) 33.0 - 36.0 mg/dL    RDW 13.8 11.5 - 17.0 %    Platelet 242 130 - 400 x10(3)/mcL    MPV 9.9 7.4 - 10.4 fL    Neut % 85.8 %    Lymph % 7.3 %    Mono % 6.5 %    Eos % 0.0 %    Basophil % 0.2 %    Lymph # 0.90 0.6 - 4.6 x10(3)/mcL    Neut # 10.54 (H) 2.1 - 9.2 x10(3)/mcL    Mono # 0.80 0.1 - 1.3 x10(3)/mcL    Eos # 0.00 0 - 0.9 x10(3)/mcL    Baso # 0.03 0 - 0.2 x10(3)/mcL    IG# 0.03 0 - 0.04 x10(3)/mcL    IG% 0.2 %    NRBC% 0.0 %   Sedimentation Rate    Collection Time: 01/16/23  1:16 PM   Result Value Ref Range    Sed Rate 63 (H) 0 - 15 mm/hr   C-reactive protein    Collection Time: 01/16/23  1:16 PM   Result Value Ref Range    C-Reactive Protein 89.80 (H) <5.00 mg/L   Blood Culture #1 **CANNOT BE ORDERED STAT**    Collection Time: 01/16/23  2:52 PM    Specimen: Arm, Right; Blood    Result Value Ref Range    GRAM STAIN Gram Positive Cocci, probable Streptococcus (AA)     GRAM STAIN Seen in gram stain of broth only (AA)     GRAM STAIN 2 of 2 bottles positive (AA)    Blood Culture #1 **CANNOT BE ORDERED STAT**    Collection Time: 01/16/23  2:52 PM    Specimen: Hand, Right; Blood   Result Value Ref Range    GRAM STAIN Gram Positive Cocci, probable Streptococcus (AA)     GRAM STAIN Seen in gram stain of broth only (AA)     GRAM STAIN 2 of 2 bottles positive (AA)    APTT    Collection Time: 01/17/23  4:04 AM   Result Value Ref Range    PTT 33.3 23.2 - 33.7 seconds   Protime-INR    Collection Time: 01/17/23  4:04 AM   Result Value Ref Range    PT 15.0 (H) 12.5 - 14.5 seconds    INR 1.20 0.00 - 1.30   CBC Without Differential    Collection Time: 01/17/23  4:05 AM   Result Value Ref Range    WBC 7.9 4.5 - 11.5 x10(3)/mcL    RBC 4.18 (L) 4.70 - 6.10 x10(6)/mcL    Hgb 11.4 (L) 14.0 - 18.0 gm/dL    Hct 33.9 (L) 42.0 - 52.0 %    Platelet 238 130 - 400 x10(3)/mcL    MCV 81.1 80.0 - 94.0 fL    MCH 27.3 pg    MCHC 33.6 33.0 - 36.0 mg/dL    RDW 13.9 11.5 - 17.0 %    MPV 11.4 (H) 7.4 - 10.4 fL    NRBC% 0.0 %   Basic Metabolic Panel    Collection Time: 01/17/23  4:05 AM   Result Value Ref Range    Sodium Level 137 136 - 145 mmol/L    Potassium Level 3.9 3.5 - 5.1 mmol/L    Chloride 103 98 - 107 mmol/L    Carbon Dioxide 24 22 - 29 mmol/L    Glucose Level 89 74 - 100 mg/dL    Blood Urea Nitrogen 12.5 8.4 - 25.7 mg/dL    Creatinine 0.88 0.73 - 1.18 mg/dL    BUN/Creatinine Ratio 14     Calcium Level Total 9.1 8.4 - 10.2 mg/dL    Anion Gap 10.0 mEq/L    eGFR >60 mls/min/1.73/m2   Magnesium    Collection Time: 01/17/23  4:05 AM   Result Value Ref Range    Magnesium Level 2.00 1.60 - 2.60 mg/dL   Phosphorus    Collection Time: 01/17/23  4:05 AM   Result Value Ref Range    Phosphorus Level 3.4 2.3 - 4.7 mg/dL   Hepatitis Panel, Acute    Collection Time: 01/17/23  4:05 AM   Result Value Ref Range    Hepatitis A IgM  Nonreactive Nonreactive    Hepatitis B Core IgM Nonreactive Nonreactive    Hepatitis B Surface Antigen Nonreactive Nonreactive    Hepatitis C Antibody Nonreactive Nonreactive   HIV 1/2 Ag/Ab (4th Gen)    Collection Time: 01/17/23  4:05 AM   Result Value Ref Range    HIV Nonreactive Nonreactive   SYPHILIS ANTIBODY (WITH REFLEX RPR)    Collection Time: 01/17/23  4:05 AM   Result Value Ref Range    Syphilis Antibody Nonreactive Nonreactive, Equivocal   Hemoglobin A1C    Collection Time: 01/17/23  4:05 AM   Result Value Ref Range    Hemoglobin A1c 5.6 <=7.0 %    Estimated Average Glucose 114.0 mg/dL   Drug Screen, Urine    Collection Time: 01/17/23  4:06 AM   Result Value Ref Range    Amphetamines, Urine Negative Negative    Barbituates, Urine Negative Negative    Benzodiazepine, Urine Negative Negative    Cannabinoids, Urine Negative Negative    Cocaine, Urine Negative Negative    Fentanyl, Urine Negative Negative    MDMA, Urine Negative Negative    Opiates, Urine Negative Negative    Phencyclidine, Urine Negative Negative    pH, Urine 5.5 3.0 - 11.0    Specific Gravity, Urine Auto 1.025 1.001 - 1.035   MRSA PCR    Collection Time: 01/17/23  4:06 AM   Result Value Ref Range    MRSA PCR SCRN ([facility]) Not Detected Not Detected   Echo    Collection Time: 01/17/23  9:37 AM   Result Value Ref Range    BSA 1.55 m2    TDI SEPTAL 0.12 m/s    LV LATERAL E/E' RATIO 7.79 m/s    LV SEPTAL E/E' RATIO 12.33 m/s    Right Atrial Pressure (from IVC) 15 mmHg    EF 61 %    Left Ventricular Outflow Tract Mean Velocity 0.89 cm/s    Left Ventricular Outflow Tract Mean Gradient 4.00 mmHg    TDI LATERAL 0.19 m/s    PV PEAK VELOCITY 1.03 cm/s    LVIDd 5.60 3.5 - 6.0 cm    IVS 1.03 0.6 - 1.1 cm    Posterior Wall 1.38 (A) 0.6 - 1.1 cm    LVIDs 3.72 2.1 - 4.0 cm    FS 34 28 - 44 %    LV mass 282.07 g    LA size 4.10 cm    RVDD 2.06 cm    Left Ventricle Relative Wall Thickness 0.49 cm    AV mean gradient 15 mmHg    AV valve area 1.93 cm2    AV  Velocity Ratio 0.53     AV index (prosthetic) 0.56     MV mean gradient 9 mmHg    MV valve area p 1/2 method 3.44 cm2    MV valve area by continuity eq 1.65 cm2    E/A ratio 1.17     Mean e' 0.16 m/s    E wave deceleration time 158.00 msec    LVOT diameter 2.10 cm    LVOT area 3.5 cm2    LVOT peak kyle 1.43 m/s    LVOT peak VTI 22.50 cm    Ao peak kyle 2.68 m/s    Ao VTI 40.4 cm    LVOT stroke volume 77.89 cm3    AV peak gradient 29 mmHg    MV peak gradient 22 mmHg    TV rest pulmonary artery pressure 49 mmHg    E/E' ratio 9.55 m/s    MV Peak E Kyle 1.48 m/s    TR Max Kyle 2.90 m/s    MV VTI 47.1 cm    MV stenosis pressure 1/2 time 64.00 ms    MV Peak A Kyle 1.26 m/s    LV Systolic Volume 58.90 mL    LV Systolic Volume Index 38.8 mL/m2    LV Diastolic Volume 154.00 mL    LV Diastolic Volume Index 101.32 mL/m2    LV Mass Index 186 g/m2    Triscuspid Valve Regurgitation Peak Gradient 34 mmHg    LA Volume Index (Mod) 85.5 mL/m2    LA volume (mod) 130.00 cm3   Chlamydia/GC, PCR    Collection Time: 01/17/23 10:02 AM    Specimen: Urine   Result Value Ref Range    Chlamydia trachomatis PCR Not Detected Not Detected    N. gonorrhea PCR Not Detected Not Detected       Significant Imaging:      EKG:  No results found for this visit on 01/16/23.    Telemetry:  not on tele    Physical Exam  HENT:      Head: Normocephalic.      Nose: Nose normal.      Mouth/Throat:      Mouth: Mucous membranes are dry.   Eyes:      Extraocular Movements: Extraocular movements intact.   Cardiovascular:      Rate and Rhythm: Normal rate and regular rhythm.      Pulses: Normal pulses.      Heart sounds: Murmur heard.   Pulmonary:      Effort: Pulmonary effort is normal.      Breath sounds: Normal breath sounds.   Abdominal:      Palpations: Abdomen is soft.   Skin:     General: Skin is warm and dry.   Neurological:      Mental Status: He is alert and oriented to person, place, and time.   Psychiatric:         Behavior: Behavior normal.       Home  Medications:   Current Facility-Administered Medications on File Prior to Encounter   Medication Dose Route Frequency Provider Last Rate Last Admin    [COMPLETED] cefTRIAXone (ROCEPHIN) 2 g in sodium chloride 0.9 % 50 mL IVPB (MB+)  2 g Intravenous ED 1 Time Nilesh Parish MD   Stopped at 01/16/23 1527    [COMPLETED] iohexoL (OMNIPAQUE 350) injection 150 mL  150 mL Intravenous ONCE PRN Nilesh Parish MD   150 mL at 01/16/23 1559    [COMPLETED] vancomycin (VANCOCIN) 1,000 mg in sodium chloride 0.9% 250 mL IVPB  1,000 mg Intravenous ED 1 Time Nilesh Parish MD   Stopped at 01/16/23 1730     No current outpatient medications on file prior to encounter.       Current Inpatient Medications:    Current Facility-Administered Medications:     acetaminophen tablet 1,000 mg, 1,000 mg, Oral, Q6H PRN, Ovidio Trivedi MD    acetaminophen tablet 650 mg, 650 mg, Oral, Q4H PRN, Ovidio Trivedi MD    aluminum-magnesium hydroxide-simethicone 200-200-20 mg/5 mL suspension 30 mL, 30 mL, Oral, QID PRN, Ovidio Trivedi MD    ceFEPIme (MAXIPIME) 2 g in dextrose 5 % in water (D5W) 5 % 50 mL IVPB (MB+), 2 g, Intravenous, Q8H, Ovidio Trivedi MD, Stopped at 01/17/23 0950    HYDROcodone-acetaminophen 5-325 mg per tablet 1 tablet, 1 tablet, Oral, Q6H PRN, Ovidio Trivedi MD    lactated ringers infusion, , Intravenous, Continuous, Ovidio Trivedi MD, Last Rate: 125 mL/hr at 01/17/23 0036, New Bag at 01/17/23 0036    melatonin tablet 6 mg, 6 mg, Oral, Nightly PRN, Ovidio Trivedi MD    methocarbamoL tablet 500 mg, 500 mg, Oral, QID PRN, Ovidio Trivedi MD, 500 mg at 01/17/23 0035    morphine injection 2 mg, 2 mg, Intravenous, Q4H PRN, Ovidio Trivedi MD    ondansetron injection 4 mg, 4 mg, Intravenous, Q4H PRN, Ovidio Trivedi MD, 4 mg at 01/17/23 0035    polyethylene glycol packet 17 g, 17 g, Oral, BID PRN, Ovidio Trivedi MD    prochlorperazine injection Soln 5 mg, 5 mg, Intravenous, Q6H PRN, Ovidio Trivedi MD    senna-docusate 8.6-50  mg per tablet 2 tablet, 2 tablet, Oral, BID PRN, Ovidio Trivedi MD    simethicone chewable tablet 80 mg, 1 tablet, Oral, QID PRN, Ovidio Trivedi MD    sodium chloride 0.9% flush 10 mL, 10 mL, Intravenous, PRN, Ovidio Trivedi MD    Pharmacy to dose Vancomycin consult, , , Once **AND** vancomycin - pharmacy to dose, , Intravenous, pharmacy to manage frequency, Ovidio Trivedi MD    vancomycin 750 mg in dextrose 5 % 250 mL IVPB (ready to mix system), 750 mg, Intravenous, Q12H, Ovidio Trivedi MD, Stopped at 01/17/23 0453         VTE Risk Mitigation (From admission, onward)           Ordered     IP VTE LOW RISK PATIENT  Once         01/16/23 2344     Place sequential compression device  Until discontinued         01/16/23 2344                    Assessment:   Sepsis  Suspected thoracic T8 diskitis/osteomyelitis  Normocytic anemia    Plan:   Official echo report pending.   Plans for LAXMI tomorrow to rule out IE.  R/B/A discussed with the patient. He agrees to proceed with the procedure.  Consent obtained and placed on the chart.  Keep NPO after midnight.     Thank you for your consult.     Inocencia Rivas, JOSE  Cardiology  [facility] - 8th Floor Med Surg  01/17/2023 1:24 PM     I have seen the patient, reviewed the Nurse Practitioner's note, assessment and plan. I have personally interviewed and examined the patient at bedside and agree with the findings. Medical decision making listed above were done under my guidance.

## 2023-01-18 ENCOUNTER — ANESTHESIA EVENT (OUTPATIENT)
Dept: CARDIOLOGY | Facility: HOSPITAL | Age: 53
DRG: 871 | End: 2023-01-18

## 2023-01-18 ENCOUNTER — ANESTHESIA (OUTPATIENT)
Dept: CARDIOLOGY | Facility: HOSPITAL | Age: 53
DRG: 871 | End: 2023-01-18

## 2023-01-18 VITALS
TEMPERATURE: 99 F | SYSTOLIC BLOOD PRESSURE: 104 MMHG | OXYGEN SATURATION: 100 % | DIASTOLIC BLOOD PRESSURE: 66 MMHG | HEART RATE: 75 BPM

## 2023-01-18 LAB
ANION GAP SERPL CALC-SCNC: 9 MEQ/L
AV INDEX (PROSTH): 0.56
AV MEAN GRADIENT: 15 MMHG
AV PEAK GRADIENT: 29 MMHG
AV VALVE AREA: 1.93 CM2
AV VELOCITY RATIO: 0.53
BASOPHILS # BLD AUTO: 0.03 X10(3)/MCL (ref 0–0.2)
BASOPHILS NFR BLD AUTO: 0.4 %
BSA FOR ECHO PROCEDURE: 1.55 M2
BSA FOR ECHO PROCEDURE: 1.55 M2
BUN SERPL-MCNC: 9.7 MG/DL (ref 8.4–25.7)
CALCIUM SERPL-MCNC: 8.6 MG/DL (ref 8.4–10.2)
CHLORIDE SERPL-SCNC: 104 MMOL/L (ref 98–107)
CO2 SERPL-SCNC: 24 MMOL/L (ref 22–29)
CREAT SERPL-MCNC: 0.91 MG/DL (ref 0.73–1.18)
CREAT/UREA NIT SERPL: 11
CV ECHO LV RWT: 0.49 CM
DOP CALC AO PEAK VEL: 2.68 M/S
DOP CALC AO VTI: 40.4 CM
DOP CALC LVOT AREA: 3.5 CM2
DOP CALC LVOT DIAMETER: 2.1 CM
DOP CALC LVOT PEAK VEL: 1.43 M/S
DOP CALC LVOT STROKE VOLUME: 77.89 CM3
DOP CALC MV VTI: 47.1 CM
DOP CALCLVOT PEAK VEL VTI: 22.5 CM
E WAVE DECELERATION TIME: 158 MSEC
E/A RATIO: 1.17
E/E' RATIO: 9.55 M/S
ECHO LV POSTERIOR WALL: 1.38 CM (ref 0.6–1.1)
EJECTION FRACTION: 61 %
EOSINOPHIL # BLD AUTO: 0.14 X10(3)/MCL (ref 0–0.9)
EOSINOPHIL NFR BLD AUTO: 2 %
ERYTHROCYTE [DISTWIDTH] IN BLOOD BY AUTOMATED COUNT: 13.6 % (ref 11.5–17)
FRACTIONAL SHORTENING: 34 % (ref 28–44)
GFR SERPLBLD CREATININE-BSD FMLA CKD-EPI: >60 MLS/MIN/1.73/M2
GLUCOSE SERPL-MCNC: 93 MG/DL (ref 74–100)
HCT VFR BLD AUTO: 32.7 % (ref 42–52)
HGB BLD-MCNC: 10.7 GM/DL (ref 14–18)
IMM GRANULOCYTES # BLD AUTO: 0.02 X10(3)/MCL (ref 0–0.04)
IMM GRANULOCYTES NFR BLD AUTO: 0.3 %
INTERVENTRICULAR SEPTUM: 1.03 CM (ref 0.6–1.1)
LEFT ATRIUM SIZE: 4.1 CM
LEFT ATRIUM VOLUME INDEX MOD: 85.5 ML/M2
LEFT ATRIUM VOLUME MOD: 130 CM3
LEFT INTERNAL DIMENSION IN SYSTOLE: 3.72 CM (ref 2.1–4)
LEFT VENTRICLE DIASTOLIC VOLUME INDEX: 101.32 ML/M2
LEFT VENTRICLE DIASTOLIC VOLUME: 154 ML
LEFT VENTRICLE MASS INDEX: 186 G/M2
LEFT VENTRICLE SYSTOLIC VOLUME INDEX: 38.8 ML/M2
LEFT VENTRICLE SYSTOLIC VOLUME: 58.9 ML
LEFT VENTRICULAR INTERNAL DIMENSION IN DIASTOLE: 5.6 CM (ref 3.5–6)
LEFT VENTRICULAR MASS: 282.07 G
LV LATERAL E/E' RATIO: 7.79 M/S
LV SEPTAL E/E' RATIO: 12.33 M/S
LVOT MG: 4 MMHG
LVOT MV: 0.89 CM/S
LYMPHOCYTES # BLD AUTO: 1.03 X10(3)/MCL (ref 0.6–4.6)
LYMPHOCYTES NFR BLD AUTO: 14.4 %
MCH RBC QN AUTO: 26.9 PG
MCHC RBC AUTO-ENTMCNC: 32.7 MG/DL (ref 33–36)
MCV RBC AUTO: 82.2 FL (ref 80–94)
MONOCYTES # BLD AUTO: 0.68 X10(3)/MCL (ref 0.1–1.3)
MONOCYTES NFR BLD AUTO: 9.5 %
MV MEAN GRADIENT: 9 MMHG
MV PEAK A VEL: 1.26 M/S
MV PEAK E VEL: 1.48 M/S
MV PEAK GRADIENT: 22 MMHG
MV STENOSIS PRESSURE HALF TIME: 64 MS
MV VALVE AREA BY CONTINUITY EQUATION: 1.65 CM2
MV VALVE AREA P 1/2 METHOD: 3.44 CM2
NEUTROPHILS # BLD AUTO: 5.23 X10(3)/MCL (ref 2.1–9.2)
NEUTROPHILS NFR BLD AUTO: 73.4 %
NRBC BLD AUTO-RTO: 0 %
PATH REV: NORMAL
PISA MRMAX VEL: 5.82 M/S
PISA TR MAX VEL: 2.9 M/S
PLATELET # BLD AUTO: 229 X10(3)/MCL (ref 130–400)
PMV BLD AUTO: 10.4 FL (ref 7.4–10.4)
POTASSIUM SERPL-SCNC: 4.2 MMOL/L (ref 3.5–5.1)
PV PEAK VELOCITY: 1.03 CM/S
RA PRESSURE: 15 MMHG
RBC # BLD AUTO: 3.98 X10(6)/MCL (ref 4.7–6.1)
RIGHT VENTRICULAR END-DIASTOLIC DIMENSION: 2.06 CM
SODIUM SERPL-SCNC: 137 MMOL/L (ref 136–145)
TDI LATERAL: 0.19 M/S
TDI SEPTAL: 0.12 M/S
TDI: 0.16 M/S
TR MAX PG: 34 MMHG
TV REST PULMONARY ARTERY PRESSURE: 49 MMHG
VANCOMYCIN TROUGH SERPL-MCNC: 7.4 UG/ML (ref 15–20)
WBC # SPEC AUTO: 7.1 X10(3)/MCL (ref 4.5–11.5)

## 2023-01-18 PROCEDURE — 63600175 PHARM REV CODE 636 W HCPCS: Performed by: INTERNAL MEDICINE

## 2023-01-18 PROCEDURE — 80048 BASIC METABOLIC PNL TOTAL CA: CPT | Performed by: INTERNAL MEDICINE

## 2023-01-18 PROCEDURE — 25000003 PHARM REV CODE 250: Performed by: INTERNAL MEDICINE

## 2023-01-18 PROCEDURE — 25000003 PHARM REV CODE 250: Performed by: NURSE ANESTHETIST, CERTIFIED REGISTERED

## 2023-01-18 PROCEDURE — 80202 ASSAY OF VANCOMYCIN: CPT | Performed by: INTERNAL MEDICINE

## 2023-01-18 PROCEDURE — 85025 COMPLETE CBC W/AUTO DIFF WBC: CPT | Performed by: INTERNAL MEDICINE

## 2023-01-18 PROCEDURE — 63600175 PHARM REV CODE 636 W HCPCS: Performed by: NURSE ANESTHETIST, CERTIFIED REGISTERED

## 2023-01-18 PROCEDURE — 36415 COLL VENOUS BLD VENIPUNCTURE: CPT | Performed by: INTERNAL MEDICINE

## 2023-01-18 PROCEDURE — 11000001 HC ACUTE MED/SURG PRIVATE ROOM

## 2023-01-18 RX ORDER — LIDOCAINE HYDROCHLORIDE 20 MG/ML
INJECTION INTRAVENOUS
Status: DISCONTINUED | OUTPATIENT
Start: 2023-01-18 | End: 2023-01-18

## 2023-01-18 RX ORDER — PROPOFOL 10 MG/ML
VIAL (ML) INTRAVENOUS CONTINUOUS PRN
Status: DISCONTINUED | OUTPATIENT
Start: 2023-01-18 | End: 2023-01-18

## 2023-01-18 RX ORDER — VANCOMYCIN HCL IN 5 % DEXTROSE 1G/250ML
1000 PLASTIC BAG, INJECTION (ML) INTRAVENOUS
Status: DISCONTINUED | OUTPATIENT
Start: 2023-01-18 | End: 2023-01-18

## 2023-01-18 RX ADMIN — LIDOCAINE HYDROCHLORIDE 80 MG: 20 INJECTION INTRAVENOUS at 09:01

## 2023-01-18 RX ADMIN — CEFEPIME 2 G: 2 INJECTION, POWDER, FOR SOLUTION INTRAVENOUS at 12:01

## 2023-01-18 RX ADMIN — SODIUM CHLORIDE, SODIUM GLUCONATE, SODIUM ACETATE, POTASSIUM CHLORIDE AND MAGNESIUM CHLORIDE: 526; 502; 368; 37; 30 INJECTION, SOLUTION INTRAVENOUS at 09:01

## 2023-01-18 RX ADMIN — VANCOMYCIN HYDROCHLORIDE 750 MG: 750 INJECTION, POWDER, LYOPHILIZED, FOR SOLUTION INTRAVENOUS at 05:01

## 2023-01-18 RX ADMIN — PROPOFOL 150 MCG/KG/MIN: 10 INJECTION, EMULSION INTRAVENOUS at 09:01

## 2023-01-18 RX ADMIN — VANCOMYCIN HYDROCHLORIDE 1000 MG: 1 INJECTION, POWDER, LYOPHILIZED, FOR SOLUTION INTRAVENOUS at 07:01

## 2023-01-18 RX ADMIN — CEFEPIME 2 G: 2 INJECTION, POWDER, FOR SOLUTION INTRAVENOUS at 10:01

## 2023-01-18 RX ADMIN — CEFEPIME 2 G: 2 INJECTION, POWDER, FOR SOLUTION INTRAVENOUS at 04:01

## 2023-01-18 NOTE — ANESTHESIA POSTPROCEDURE EVALUATION
Anesthesia Post Evaluation    Patient: Flako Power    Procedure(s) Performed: * No procedures listed *    Final Anesthesia Type: general      Patient location during evaluation: PACU  Patient participation: Yes- Able to Participate  Level of consciousness: awake and alert  Post-procedure vital signs: reviewed and stable  Pain management: adequate  Airway patency: patent      Anesthetic complications: no      Cardiovascular status: hemodynamically stable  Respiratory status: unassisted  Hydration status: euvolemic  Follow-up not needed.          Vitals Value Taken Time   /73 01/18/23 1113   Temp 37 °C (98.6 °F) 01/18/23 1113   Pulse 76 01/18/23 1113   Resp 36.7 01/18/23 1151   SpO2 98 % 01/18/23 1113         No case tracking events are documented in the log.      Pain/Chen Score: No data recorded

## 2023-01-18 NOTE — TRANSFER OF CARE
"Anesthesia Transfer of Care Note    Patient: Flako Power    Procedure(s) Performed: * No procedures listed *    Patient location: Cath Lab    Anesthesia Type: general    Transport from OR: Transported from OR on room air with adequate spontaneous ventilation    Post pain: adequate analgesia    Post assessment: no apparent anesthetic complications    Post vital signs: stable    Level of consciousness: awake    Nausea/Vomiting: no nausea/vomiting    Complications: none    Transfer of care protocol was followed      Last vitals:   Visit Vitals  /66   Pulse 82   Temp 37 °C (98.6 °F)   Resp 14   Ht 4' 11" (1.499 m)   Wt 57.6 kg (127 lb)   SpO2 99%   BMI 25.65 kg/m²     "

## 2023-01-18 NOTE — PLAN OF CARE
01/18/23 0902   Discharge Assessment   Assessment Type Discharge Planning Assessment   Confirmed/corrected address, phone number and insurance Yes   Confirmed Demographics Correct on Facesheet   Source of Information patient   Reason For Admission discitis   People in Home child(orquidea), adult   Do you expect to return to your current living situation? Yes   Do you have help at home or someone to help you manage your care at home? Yes   Who are your caregiver(s) and their phone number(s)? Pt lives with 2 daughters ages 20 and 23- Emergency contact dian Stroud [phone]   Current cognitive status: Alert/Oriented;No Deficits   Equipment Currently Used at Home none   Do you currently have service(s) that help you manage your care at home? No   Who is going to help you get home at discharge? Daughter   How do you get to doctors appointments? family or friend will provide   Are you on dialysis? No   Do you take coumadin? No   Discharge Plan A Home with family   Discharge Plan B Home with family   Discharge Plan discussed with: Patient   Discharge Barriers Identified Unisured     Pt states he lives with daughters who are 20 and 23 years old. Pt states he is independent with ADL's prior to admission. He does not drive but states one of his daughters provide transportation for groceries, etc. He does not have DME and is not active with any home health agency. Pt does not have a PCP. Will follow for discharge needs.

## 2023-01-18 NOTE — PROGRESS NOTES
[facility]  Hospital Medicine Progress Note        Chief Complaint: Inpatient Follow-up for T spine diskitis     HPI:   This is a 52-year-old male  with no significant past medical history  presented to [facility] ED  on 01/16/2023 with complaint of back pain. His  symptoms initially started in mid December 2022  with left knee pain and was seen at  at an urgent care and had left knees x-rays that were unremarkable and was prescribed NSAID,  subsequently started having back pain  and visited [facility] ED on 12/20/2022 and was diagnosed with lumbosacral strain and prescribed NSAID, tramadol and baclofen.  His mid lower back pain continued to get worse and over the past week started having nonproductive cough upon waking from sleep in the morning and had noticed that whenever he coughs his back pain worsens and radiates bilaterally to his abdomen.  Denies loss of bowel or bladder control or lower extremity weakness. Denies shortness breath, chest pain, fever or chills until he arrived to [facility] ED today on 1/6/2023 where he found to be febrile 102.7, tachycardic and hypertensive.  His labs notable for WBC 12.3, ESR 63, CRP 89.  COVID 19 and flu negative. CTA chest show no pulmonary embolus or parenchymal lung disease, irregularity of T8 inferior endplate may relate to degenerative change but cannot exclude discitis/osteomyelitis.  CT lumbar spine showed degenerative changes and spondylolisthesis at L5-S1. He was given IV fluid, ceftriaxone and vancomycin and  transferred to [facility] on 1/16/23 and referred to hospital medicine service for further evaluation and management.    Interval Hx:   Patient today awake and comfortable. Has been afebrile. Denies any SOB or chest pain. LAXMI shows a large MV vegetation with MR. Informed patient and family about the next treatment plan. CT surgery team was consulted.     Objective/physical exam:  General: In no acute distress, afebrile  Chest: Clear to auscultation  bilaterally  Heart: RRR, +S1, S2, Loud systolic murmur   Abdomen: Soft, nontender, BS +  MSK: Warm, no lower extremity edema, no clubbing or cyanosis  Neurologic: Alert and oriented x4, Cranial nerve II-XII intact, Strength 5/5 in all 4 extremities    VITAL SIGNS: 24 HRS MIN & MAX LAST   Temp  Min: 98.4 °F (36.9 °C)  Max: 98.8 °F (37.1 °C) 98.6 °F (37 °C)   BP  Min: 104/66  Max: 153/79 117/73   Pulse  Min: 72  Max: 96  76   Resp  Min: 0  Max: 25 14   SpO2  Min: 92 %  Max: 100 % 98 %       Recent Labs   Lab 01/16/23  1315 01/17/23  0405 01/18/23  0356   WBC 12.3* 7.9 7.1   RBC 4.13* 4.18* 3.98*   HGB 11.3* 11.4* 10.7*   HCT 34.4* 33.9* 32.7*   MCV 83.3 81.1 82.2   MCH 27.4 27.3 26.9   MCHC 32.8* 33.6 32.7*   RDW 13.8 13.9 13.6    238 229   MPV 9.9 11.4* 10.4       Recent Labs   Lab 01/16/23  1315 01/17/23  0405 01/18/23  0356    137 137   K 4.2 3.9 4.2   CO2 26 24 24   BUN 11.8 12.5 9.7   CREATININE 1.19* 0.88 0.91   CALCIUM 9.6 9.1 8.6   MG  --  2.00  --    ALBUMIN 3.1*  --   --    ALKPHOS 85  --   --    ALT 31  --   --    AST 25  --   --    BILITOT 0.6  --   --           Microbiology Results (last 7 days)       Procedure Component Value Units Date/Time    Chlamydia/GC, PCR [[id number]]  (Normal) Collected: 01/17/23 1002    Order Status: Completed Specimen: Urine Updated: 01/17/23 1208     Chlamydia trachomatis PCR Not Detected     N. gonorrhea PCR Not Detected    Narrative:      The Xpert CT/NG test, performed on the GeneXpert system is a qualitative in vitro real-time polymerase chain reaction (PCR) test for the automated detected and differentiation for genomic DNA from Chlamydia trachomatis (CT) and/or Neisseria gonorrhoeae (NG).             See below for Radiology    Scheduled Med:   ceFEPime (MAXIPIME) IVPB  2 g Intravenous Q8H    vancomycin (VANCOCIN) IVPB  750 mg Intravenous Q12H    vancomycin (VANCOCIN) IVPB  1,000 mg Intravenous Q12H        Continuous Infusions:   lactated ringers 75 mL/hr at  01/17/23 1548        PRN Meds:  acetaminophen, acetaminophen, aluminum-magnesium hydroxide-simethicone, HYDROcodone-acetaminophen, melatonin, methocarbamoL, morphine, ondansetron, polyethylene glycol, prochlorperazine, senna-docusate 8.6-50 mg, simethicone, sodium chloride 0.9%, Pharmacy to dose Vancomycin consult **AND** vancomycin - pharmacy to dose       Assessment/Plan:  Sepsis   T spine diskitis/Osteomyelitis   MV Infective endocarditis  Severe MR   Anemia, NC/NC    Plan:  Patient continues to be afebrile.   LAXMI showed large MV vegetation and MR   CT surgery team has been consulted   Cont IV Vancomycin, f/u by ID team   Cont supportive care   Will closely monitor patients daily weight, urine out put, renal parameters and volume status    Seen by neurosurgery team and recommended no surgery now       Critical care note:  Critical care diagnosis: Sepsis needing iv antibiotics   Critical care interventions: Hands-on evaluation, review of labs/radiographs/records and discussion with patient and family if present  Critical care time spent: 35 minutes     VTE prophylaxis: Lovenox     Patient condition:  Guarded    Anticipated discharge and Disposition:         All diagnosis and differential diagnosis have been reviewed; assessment and plan has been documented; I have personally reviewed the labs and test results that are presently available; I have reviewed the patients medication list; I have reviewed the consulting providers response and recommendations. I have reviewed or attempted to review medical records based upon their availability    All of the patient's questions have been  addressed and answered. Patient's is agreeable to the above stated plan. I will continue to monitor closely and make adjustments to medical management as needed.  _____________________________________________________________________    Nutrition Status:    Radiology:  Transesophageal echo (LAXMI)  Procedure: LAXMI     Final  impression:    Large MV vegetation associated with valve perforation and severe MR.  No other vegetations noted affecting the AV, TV and PV.  LV systolic function 60-65%.  No intracardiac thrombus is present.    Recommendations:    CT surgery consult   [facility] prior to MV replacement    Indication: bacteremia, systolic murmur    Informed consent: obtained, signed copy placed in the chart.    Description of the procedure: After sedation was achieved (please see   anesthesia report for details), the esophagus intubated without   difficulties. Multiple orthogonal views obtained. Patient tolerated   procedure without immediate complications noted.    Findings:  Left atrium (LA): Normal LA size.  Left atrial appendage (JEN): No JEN thrombus is present.  Interatrial septum:  NO ASD or PFO noted on 2D or Color Doppler images.   Right atrium (RA): Normal RA size.  Left ventricle (LV): LVEF 60-65%.   Normal LV size.  Normal LV wall   thickness. No Regional wall motion abnormalities noted.  Right ventricle (RV): Partially visualized in this study.  Aortic valve: Trileaflet.  No Aortic stenosis.  Trace-mild aortic   regurgitation. No Vegetation is present.  Mitral valve: Large MV vegetation associated with valve perforation and   severe MR.  Tricuspid valve: Partially visualized, No Tricuspid stenosis. Trace   Tricuspid regurgitation. No Vegetation is present.  Pulmonic valve: Not well visualized, no hemodynamically significant   pulmonic stenosis, no pulmonic regurgitation noted in this study. No   Vegetation is present.  Thoracic aorta: Not well visualized in this study.  Pericardium: No significant pericardial effusion is present.  Echo  · Concentric hypertrophy and normal systolic function.  · The estimated ejection fraction is 61%.  · Normal left ventricular diastolic function.  · Normal right ventricular size.  · Mild left atrial enlargement.  · Severe mitral regurgitation.  · There is anterior mitral leaflet vegetation.  ·  Moderate tricuspid regurgitation.  · Elevated central venous pressure (15 mmHg).  · The estimated PA systolic pressure is 49 mmHg.  · There is pulmonary hypertension.         Cash Martinez MD   01/18/2023

## 2023-01-19 PROBLEM — R78.81 BACTEREMIA: Status: ACTIVE | Noted: 2023-01-19

## 2023-01-19 PROBLEM — I34.0 SEVERE MITRAL VALVE REGURGITATION: Status: ACTIVE | Noted: 2023-01-19

## 2023-01-19 LAB
ANION GAP SERPL CALC-SCNC: 9 MEQ/L
BACTERIA BLD CULT: ABNORMAL
BACTERIA BLD CULT: ABNORMAL
BASOPHILS # BLD AUTO: 0.04 X10(3)/MCL (ref 0–0.2)
BASOPHILS NFR BLD AUTO: 0.5 %
BUN SERPL-MCNC: 9.1 MG/DL (ref 8.4–25.7)
CALCIUM SERPL-MCNC: 9 MG/DL (ref 8.4–10.2)
CHLORIDE SERPL-SCNC: 103 MMOL/L (ref 98–107)
CO2 SERPL-SCNC: 26 MMOL/L (ref 22–29)
CREAT SERPL-MCNC: 0.89 MG/DL (ref 0.73–1.18)
CREAT/UREA NIT SERPL: 10
EOSINOPHIL # BLD AUTO: 0.16 X10(3)/MCL (ref 0–0.9)
EOSINOPHIL NFR BLD AUTO: 2.1 %
ERYTHROCYTE [DISTWIDTH] IN BLOOD BY AUTOMATED COUNT: 13.6 % (ref 11.5–17)
GFR SERPLBLD CREATININE-BSD FMLA CKD-EPI: >60 MLS/MIN/1.73/M2
GLUCOSE SERPL-MCNC: 92 MG/DL (ref 74–100)
GRAM STN SPEC: ABNORMAL
HCT VFR BLD AUTO: 33.1 % (ref 42–52)
HGB BLD-MCNC: 10.8 GM/DL (ref 14–18)
IMM GRANULOCYTES # BLD AUTO: 0.02 X10(3)/MCL (ref 0–0.04)
IMM GRANULOCYTES NFR BLD AUTO: 0.3 %
LYMPHOCYTES # BLD AUTO: 1.49 X10(3)/MCL (ref 0.6–4.6)
LYMPHOCYTES NFR BLD AUTO: 19.8 %
MAGNESIUM SERPL-MCNC: 1.9 MG/DL (ref 1.6–2.6)
MCH RBC QN AUTO: 27.1 PG
MCHC RBC AUTO-ENTMCNC: 32.6 MG/DL (ref 33–36)
MCV RBC AUTO: 83 FL (ref 80–94)
MONOCYTES # BLD AUTO: 0.62 X10(3)/MCL (ref 0.1–1.3)
MONOCYTES NFR BLD AUTO: 8.3 %
NEUTROPHILS # BLD AUTO: 5.18 X10(3)/MCL (ref 2.1–9.2)
NEUTROPHILS NFR BLD AUTO: 69 %
NRBC BLD AUTO-RTO: 0 %
PLATELET # BLD AUTO: 238 X10(3)/MCL (ref 130–400)
PMV BLD AUTO: 10.6 FL (ref 7.4–10.4)
POCT GLUCOSE: 112 MG/DL (ref 70–110)
POCT GLUCOSE: 85 MG/DL (ref 70–110)
POTASSIUM SERPL-SCNC: 3.9 MMOL/L (ref 3.5–5.1)
RBC # BLD AUTO: 3.99 X10(6)/MCL (ref 4.7–6.1)
SODIUM SERPL-SCNC: 138 MMOL/L (ref 136–145)
VANCOMYCIN TROUGH SERPL-MCNC: 8.7 UG/ML (ref 15–20)
WBC # SPEC AUTO: 7.5 X10(3)/MCL (ref 4.5–11.5)

## 2023-01-19 PROCEDURE — 99223 PR INITIAL HOSPITAL CARE,LEVL III: ICD-10-PCS | Mod: ,,, | Performed by: THORACIC SURGERY (CARDIOTHORACIC VASCULAR SURGERY)

## 2023-01-19 PROCEDURE — C1769 GUIDE WIRE: HCPCS | Performed by: STUDENT IN AN ORGANIZED HEALTH CARE EDUCATION/TRAINING PROGRAM

## 2023-01-19 PROCEDURE — 36415 COLL VENOUS BLD VENIPUNCTURE: CPT | Performed by: NURSE PRACTITIONER

## 2023-01-19 PROCEDURE — 99223 1ST HOSP IP/OBS HIGH 75: CPT | Mod: ,,, | Performed by: THORACIC SURGERY (CARDIOTHORACIC VASCULAR SURGERY)

## 2023-01-19 PROCEDURE — 80202 ASSAY OF VANCOMYCIN: CPT | Performed by: INTERNAL MEDICINE

## 2023-01-19 PROCEDURE — 27000221 HC OXYGEN, UP TO 24 HOURS

## 2023-01-19 PROCEDURE — 63600175 PHARM REV CODE 636 W HCPCS: Performed by: INTERNAL MEDICINE

## 2023-01-19 PROCEDURE — 25000003 PHARM REV CODE 250: Performed by: STUDENT IN AN ORGANIZED HEALTH CARE EDUCATION/TRAINING PROGRAM

## 2023-01-19 PROCEDURE — C1894 INTRO/SHEATH, NON-LASER: HCPCS | Performed by: STUDENT IN AN ORGANIZED HEALTH CARE EDUCATION/TRAINING PROGRAM

## 2023-01-19 PROCEDURE — 83735 ASSAY OF MAGNESIUM: CPT | Performed by: NURSE PRACTITIONER

## 2023-01-19 PROCEDURE — 80048 BASIC METABOLIC PNL TOTAL CA: CPT | Performed by: NURSE PRACTITIONER

## 2023-01-19 PROCEDURE — 93010 EKG 12-LEAD: ICD-10-PCS | Mod: ,,, | Performed by: INTERNAL MEDICINE

## 2023-01-19 PROCEDURE — 85025 COMPLETE CBC W/AUTO DIFF WBC: CPT | Performed by: NURSE PRACTITIONER

## 2023-01-19 PROCEDURE — 11000001 HC ACUTE MED/SURG PRIVATE ROOM

## 2023-01-19 PROCEDURE — 99152 MOD SED SAME PHYS/QHP 5/>YRS: CPT | Performed by: STUDENT IN AN ORGANIZED HEALTH CARE EDUCATION/TRAINING PROGRAM

## 2023-01-19 PROCEDURE — 93005 ELECTROCARDIOGRAM TRACING: CPT

## 2023-01-19 PROCEDURE — 63600175 PHARM REV CODE 636 W HCPCS: Performed by: STUDENT IN AN ORGANIZED HEALTH CARE EDUCATION/TRAINING PROGRAM

## 2023-01-19 PROCEDURE — 93454 CORONARY ARTERY ANGIO S&I: CPT | Performed by: STUDENT IN AN ORGANIZED HEALTH CARE EDUCATION/TRAINING PROGRAM

## 2023-01-19 PROCEDURE — 25500020 PHARM REV CODE 255: Performed by: STUDENT IN AN ORGANIZED HEALTH CARE EDUCATION/TRAINING PROGRAM

## 2023-01-19 PROCEDURE — 93010 ELECTROCARDIOGRAM REPORT: CPT | Mod: ,,, | Performed by: INTERNAL MEDICINE

## 2023-01-19 PROCEDURE — 25000003 PHARM REV CODE 250: Performed by: INTERNAL MEDICINE

## 2023-01-19 PROCEDURE — 21400001 HC TELEMETRY ROOM

## 2023-01-19 RX ORDER — HYDRALAZINE HYDROCHLORIDE 20 MG/ML
10 INJECTION INTRAMUSCULAR; INTRAVENOUS EVERY 4 HOURS PRN
Status: DISCONTINUED | OUTPATIENT
Start: 2023-01-19 | End: 2023-01-25 | Stop reason: HOSPADM

## 2023-01-19 RX ORDER — FENTANYL CITRATE 50 UG/ML
INJECTION, SOLUTION INTRAMUSCULAR; INTRAVENOUS
Status: DISCONTINUED | OUTPATIENT
Start: 2023-01-19 | End: 2023-01-25 | Stop reason: HOSPADM

## 2023-01-19 RX ORDER — MIDAZOLAM HYDROCHLORIDE 1 MG/ML
INJECTION INTRAMUSCULAR; INTRAVENOUS
Status: DISCONTINUED | OUTPATIENT
Start: 2023-01-19 | End: 2023-01-25 | Stop reason: HOSPADM

## 2023-01-19 RX ORDER — LIDOCAINE HYDROCHLORIDE 10 MG/ML
INJECTION, SOLUTION EPIDURAL; INFILTRATION; INTRACAUDAL; PERINEURAL
Status: DISCONTINUED | OUTPATIENT
Start: 2023-01-19 | End: 2023-01-25 | Stop reason: HOSPADM

## 2023-01-19 RX ORDER — DIPHENHYDRAMINE HYDROCHLORIDE 50 MG/ML
INJECTION INTRAMUSCULAR; INTRAVENOUS
Status: DISCONTINUED | OUTPATIENT
Start: 2023-01-19 | End: 2023-01-25 | Stop reason: HOSPADM

## 2023-01-19 RX ORDER — SODIUM CHLORIDE 9 MG/ML
100 INJECTION, SOLUTION INTRAVENOUS CONTINUOUS
Status: ACTIVE | OUTPATIENT
Start: 2023-01-19 | End: 2023-01-19

## 2023-01-19 RX ORDER — CEFTRIAXONE 2 G/50ML
2 INJECTION, SOLUTION INTRAVENOUS
Status: DISCONTINUED | OUTPATIENT
Start: 2023-01-19 | End: 2023-01-25 | Stop reason: HOSPADM

## 2023-01-19 RX ADMIN — CEFEPIME 2 G: 2 INJECTION, POWDER, FOR SOLUTION INTRAVENOUS at 12:01

## 2023-01-19 RX ADMIN — CEFTRIAXONE 2 G: 2 INJECTION, SOLUTION INTRAVENOUS at 03:01

## 2023-01-19 RX ADMIN — SODIUM CHLORIDE 100 ML: 9 INJECTION, SOLUTION INTRAVENOUS at 02:01

## 2023-01-19 RX ADMIN — VANCOMYCIN HYDROCHLORIDE 750 MG: 750 INJECTION, POWDER, LYOPHILIZED, FOR SOLUTION INTRAVENOUS at 04:01

## 2023-01-19 RX ADMIN — CEFEPIME 2 G: 2 INJECTION, POWDER, FOR SOLUTION INTRAVENOUS at 08:01

## 2023-01-19 NOTE — ASSESSMENT & PLAN NOTE
Would recommend mitral valve replacement following treatment for bacteremia and negative blood cultures x2.  Risks, benefits, and alternatives have been discussed.  Questions have been answered.  The patient voices understanding and agrees.

## 2023-01-19 NOTE — ASSESSMENT & PLAN NOTE
Plan per Infectious Disease.  Continue antibiotics as directed.  Await negative blood cultures to plan proceeding with mitral valve surgery.

## 2023-01-19 NOTE — PROGRESS NOTES
Pharmacokinetic Assessment Follow Up: IV Vancomycin    Vancomycin serum concentration assessment(s):    The trough level was drawn correctly and can be used to guide therapy at this time. The measurement is below the desired definitive target range of 15 to 20 mcg/mL.    Vancomycin Regimen Plan:    Change regimen to Vancomycin 1250 mg IV every 12 hours with next serum trough concentration measured at 1600 prior to third dose on 01/20    Scheduled Administration Times    1700  0500    Drug levels (last 3 results):  Recent Labs   Lab Result Units 01/18/23  0356 01/19/23  0432   Vancomycin Trough ug/ml 7.4* 8.7*       Vancomycin Administrations:  vancomycin given in the last 96 hours                     vancomycin 750 mg in dextrose 5 % 250 mL IVPB (ready to mix system) (mg) 750 mg New Bag 01/19/23 0427     750 mg New Bag 01/18/23 1723     750 mg New Bag 01/17/23 1548     750 mg New Bag  0353    vancomycin in dextrose 5 % 1 gram/250 mL IVPB 1,000 mg (mg) 1,000 mg New Bag 01/18/23 0710    vancomycin (VANCOCIN) 1,000 mg in sodium chloride 0.9% 250 mL IVPB (mg) 1,000 mg New Bag 01/16/23 1600                    Pharmacy will continue to follow and monitor vancomycin.    Please contact pharmacy at extension [phone] for questions regarding this assessment.    Thank you for the consult,   Shanta Chavarria       Patient brief summary:  Flako Power is a 52 y.o. male initiated on antimicrobial therapy with IV Vancomycin for treatment of bone/joint infection    The patient's current regimen is vancomycin 750mg Q12h    Drug Allergies:   Review of patient's allergies indicates:  No Known Allergies    Actual Body Weight:   57.6 kg    Renal Function:   Estimated Creatinine Clearance: 71 mL/min (based on SCr of 0.89 mg/dL).,     Dialysis Method (if applicable):  N/A    CBC (last 72 hours):  Recent Labs   Lab Result Units 01/16/23  1315 01/17/23  0405 01/18/23  0356 01/19/23  0432   WBC x10(3)/mcL 12.3* 7.9 7.1 7.5   Hgb gm/dL 11.3*  11.4* 10.7* 10.8*   Hemoglobin A1c %  --  5.6  --   --    Hct % 34.4* 33.9* 32.7* 33.1*   Platelet x10(3)/mcL 242 238 229 238   Mono % % 6.5  --  9.5 8.3   Eos % % 0.0  --  2.0 2.1   Basophil % % 0.2  --  0.4 0.5       Metabolic Panel (last 72 hours):  Recent Labs   Lab Result Units 01/16/23  1311 01/16/23  1315 01/17/23  0405 01/18/23  0356 01/19/23  0432   Sodium Level mmol/L  --  138 137 137 138   Potassium Level mmol/L  --  4.2 3.9 4.2 3.9   Chloride mmol/L  --  102 103 104 103   Carbon Dioxide mmol/L  --  26 24 24 26   Glucose Level mg/dL  --  100 89 93 92   Glucose, UA mg/dL Normal  --   --   --   --    Blood Urea Nitrogen mg/dL  --  11.8 12.5 9.7 9.1   Creatinine mg/dL  --  1.19* 0.88 0.91 0.89   Albumin Level g/dL  --  3.1*  --   --   --    Bilirubin Total mg/dL  --  0.6  --   --   --    Alkaline Phosphatase unit/L  --  85  --   --   --    Aspartate Aminotransferase unit/L  --  25  --   --   --    Alanine Aminotransferase unit/L  --  31  --   --   --    Magnesium Level mg/dL  --   --  2.00  --  1.90   Phosphorus Level mg/dL  --   --  3.4  --   --        Microbiologic Results:  Microbiology Results (last 7 days)       Procedure Component Value Units Date/Time    Chlamydia/GC, PCR [[id number]]  (Normal) Collected: 01/17/23 1002    Order Status: Completed Specimen: Urine Updated: 01/17/23 1207     Chlamydia trachomatis PCR Not Detected     N. gonorrhea PCR Not Detected    Narrative:      The Xpert CT/NG test, performed on the GeneXpert system is a qualitative in vitro real-time polymerase chain reaction (PCR) test for the automated detected and differentiation for genomic DNA from Chlamydia trachomatis (CT) and/or Neisseria gonorrhoeae (NG).

## 2023-01-19 NOTE — CONSULTS
[facility] - 8th Floor Med Surg  Cardiothoracic Surgery  Consult Note    Patient Name: Flako Power  MRN: [id number]  Admission Date: 1/16/2023  Attending Physician: Cash Martinez MD  Referring Provider: Erik Mireles    Patient information was obtained from patient, past medical records, ER records, and primary team.     Inpatient consult to Cardiothoracic Surgery  Consult performed by: Jose Ramon Rodrigues MD  Consult ordered by: JOSE Bethea  Reason for consult: Severe mitral regurgitation with intact left ventricular function  Assessment/Recommendations: Patient is a 52-year-old black male with no significant past medical history who was recently found to have increasing shortness of breath and dyspnea on exertion.  He has subsequently been found on LAXMI to have severe mitral regurgitation with what appears to be a tattered anterior leaflet with ruptured chordae tendineae.  Left heart catheterization has yet to be performed.  Patient has also been found to be bacteremic and is currently awaiting Infectious Disease evaluation.  Would prefer medical management with appropriate antibiotic therapy for least 2 weeks prior to consideration for mitral valve replacement so as to obviate prosthesis infection.  Will await Infectious Disease recommendations and left heart catheterization results.  Will follow.      Subjective:     Chief Complaint/Reason for Admission:  Increasing shortness of breath with dyspnea    History of Present Illness: This is a 52-year-old male  with no significant past medical history  presented to [facility] ED  on 01/16/2023 with complaint of back pain. His  symptoms initially started in mid December 2022  with left knee pain and was seen at  at an urgent care and had left knees x-rays that were unremarkable and was prescribed NSAID,  subsequently started having back pain  and visited [facility] ED on 12/20/2022 and was diagnosed with lumbosacral strain and prescribed NSAID,  tramadol and baclofen.  His mid lower back pain continued to get worse and over the past week started having nonproductive cough upon waking from sleep in the morning and had noticed that whenever he coughs his back pain worsens and radiates bilaterally to his abdomen.  Denies loss of bowel or bladder control or lower extremity weakness. Denies shortness breath, chest pain, fever or chills until he arrived to [facility] ED today on 1/6/2023 where he found to be febrile 102.7, tachycardic and hypertensive.  His labs notable for WBC 12.3, ESR 63, CRP 89.  COVID 19 and flu negative. CTA chest show no pulmonary embolus or parenchymal lung disease, irregularity of T8 inferior endplate may relate to degenerative change but cannot exclude discitis/osteomyelitis.  CT lumbar spine showed degenerative changes and spondylolisthesis at L5-S1. He was given IV fluid, ceftriaxone and vancomycin and  transferred to [facility] on 1/16/23 and referred to hospital medicine service for further evaluation and management.    He has subsequently been found on transesophageal echocardiogram to have severe mitral regurgitation with what appears to be a tethered anterior mitral valve leaflet.  There does not seem to be any evidence of vegetation, however, it is hard to say.  There was no evidence abscess or large valvular vegetation greater than 1 cm.  There is also no evidence of discrete embolization.  Blood cultures have been positive for Gram-positive organisms and we await Infectious Disease recommendations.    Transesophageal echocardiogram has been reviewed by me and I await left heart catheterization which will be performed tomorrow.    No current facility-administered medications on file prior to encounter.     No current outpatient medications on file prior to encounter.       Review of patient's allergies indicates:  No Known Allergies    No past medical history on file.  No past surgical history on file.  Family History    None       Tobacco  Use    Smoking status: Not on file    Smokeless tobacco: Not on file   Substance and Sexual Activity    Alcohol use: Not on file    Drug use: Not on file    Sexual activity: Not on file     Review of Systems   Constitutional:  Negative for activity change, chills, diaphoresis and fever.   HENT:  Positive for sore throat. Negative for congestion, dental problem, drooling, mouth sores, trouble swallowing and voice change.    Eyes: Negative.    Respiratory:  Positive for shortness of breath. Negative for apnea, cough, choking, chest tightness, wheezing and stridor.    Cardiovascular:  Positive for palpitations. Negative for chest pain and leg swelling.   Gastrointestinal:  Negative for abdominal distention, abdominal pain, blood in stool, nausea and vomiting.   Endocrine: Negative.    Musculoskeletal:  Negative for arthralgias, back pain, gait problem, neck pain and neck stiffness.   Skin:  Negative for color change, pallor, rash and wound.   Allergic/Immunologic: Negative.    Neurological:  Negative for dizziness, seizures, syncope, facial asymmetry, weakness, light-headedness and headaches.   Hematological:  Negative for adenopathy.   Psychiatric/Behavioral:  Negative for agitation and hallucinations.    Objective:     Vital Signs (Most Recent):  Temp: 98.7 °F (37.1 °C) (01/19/23 1116)  Pulse: 77 (01/19/23 1116)  Resp: 18 (01/19/23 1116)  BP: (!) 145/90 (01/19/23 1116)  SpO2: 99 % (01/19/23 1116)   Vital Signs (24h Range):  Temp:  [98.4 °F (36.9 °C)-98.7 °F (37.1 °C)] 98.7 °F (37.1 °C)  Pulse:  [68-82] 77  Resp:  [18] 18  SpO2:  [98 %-100 %] 99 %  BP: (132-150)/(82-92) 145/90     Weight: 57.6 kg (127 lb)  Body mass index is 25.65 kg/m².    SpO2: 99 %        Intake/Output - Last 3 Shifts         01/17 0700  01/18 0659 01/18 0700  01/19 0659 01/19 0700  01/20 0659    P.O. 0 500     I.V. (mL/kg) 1000 (17.4)  900 (15.6)    IV Piggyback  500     Total Intake(mL/kg) 1000 (17.4) 1000 (17.4) 900 (15.6)    Urine (mL/kg/hr)  450 (0.3) 1450 (1)     Stool 0 0     Total Output 450 1450     Net +550 -450 +900           Stool Occurrence 0 x 2 x              Lines/Drains/Airways       Peripheral Intravenous Line  Duration                  Peripheral IV - Single Lumen 01/16/23 2330 Posterior;Right Wrist 2 days                     STS Risk Score: 6%    Physical Exam  Vitals and nursing note reviewed.   Constitutional:       General: He is not in acute distress.     Appearance: Normal appearance.   HENT:      Head: Normocephalic and atraumatic.      Nose: Nose normal. No congestion.      Mouth/Throat:      Mouth: Mucous membranes are moist.   Eyes:      General: No scleral icterus.     Extraocular Movements: Extraocular movements intact.      Conjunctiva/sclera: Conjunctivae normal.      Pupils: Pupils are equal, round, and reactive to light.   Neck:      Thyroid: No thyroid mass or thyromegaly.      Vascular: No carotid bruit or JVD.   Cardiovascular:      Rate and Rhythm: Normal rate and regular rhythm.      Pulses: Normal pulses.           Carotid pulses are 2+ on the right side and 2+ on the left side.       Radial pulses are 2+ on the right side and 2+ on the left side.        Femoral pulses are 2+ on the right side and 2+ on the left side.       Dorsalis pedis pulses are 2+ on the right side and 2+ on the left side.        Posterior tibial pulses are 2+ on the right side and 2+ on the left side.      Heart sounds: Murmur heard.   Systolic murmur is present with a grade of 2/6.     No friction rub. No gallop.   Pulmonary:      Effort: Pulmonary effort is normal. No respiratory distress.      Breath sounds: Normal breath sounds. No stridor. No wheezing, rhonchi or rales.   Chest:      Chest wall: No tenderness.   Abdominal:      General: Abdomen is flat. Bowel sounds are normal. There is no distension.      Palpations: Abdomen is soft. There is no hepatomegaly, splenomegaly, mass or pulsatile mass.      Tenderness: There is no abdominal  tenderness. There is no rebound.   Musculoskeletal:         General: No swelling. Normal range of motion.      Cervical back: Normal range of motion. No rigidity or tenderness.      Right lower le+ Edema present.      Left lower le+ Edema present.   Lymphadenopathy:      Cervical: No cervical adenopathy.      Upper Body:      Right upper body: No supraclavicular or axillary adenopathy.      Left upper body: No supraclavicular or axillary adenopathy.   Skin:     General: Skin is warm and dry.   Neurological:      General: No focal deficit present.      Mental Status: He is alert and oriented to person, place, and time. Mental status is at baseline.      Cranial Nerves: No cranial nerve deficit.      Sensory: No sensory deficit.      Motor: No weakness.      Gait: Gait normal.   Psychiatric:         Mood and Affect: Mood normal.       Significant Labs:  ABGs: No results for input(s): PH, PCO2, PO2, HCO3, POCSATURATED, BE in the last 48 hours.  CBC:   Recent Labs   Lab 23  0432   WBC 7.5   RBC 3.99*   HGB 10.8*   HCT 33.1*      MCV 83.0   MCH 27.1   MCHC 32.6*     CMP:   Recent Labs   Lab 23  0432   CALCIUM 9.0      K 3.9   CO2 26   BUN 9.1   CREATININE 0.89     Coagulation: No results for input(s): PT, INR, APTT in the last 48 hours.  All pertinent labs from the last 24 hours have been reviewed.    Significant Diagnostics:  CXR: I have reviewed all pertinent results/findings within the past 24 hours  Echo: I have reviewed all pertinent results/findings within the past 24 hours  EKG: I have reviewed all pertinent results/findings within the past 24 hours  LAXMI: I have reviewed all pertinent results/findings within the past 24 hours  U/S: I have reviewed all pertinent results/findings within the past 24 hours  I have reviewed and interpreted all pertinent imaging results/findings within the past 24 hours.    Assessment/Plan:     NYHA Score: NYHA II: slight limitation of physical activity,  comfortable at rest    Active Diagnoses:    Diagnosis Date Noted POA    PRINCIPAL PROBLEM:  Severe mitral valve regurgitation [I34.0] 01/19/2023 Yes    Bacteremia [R78.81] 01/19/2023 Yes      Problems Resolved During this Admission:       Thank you for your consult. I will follow-up with patient. Please contact us if you have any additional questions.    Jose Ramon Rodrigues MD  Cardiothoracic Surgery  [facility] - 8th Floor Med Surg

## 2023-01-19 NOTE — PROGRESS NOTES
[facility]  Hospital Medicine Progress Note        Chief Complaint: Inpatient Follow-up for T spine diskitis     HPI:   This is a 52-year-old male  with no significant past medical history  presented to [facility] ED  on 01/16/2023 with complaint of back pain. His  symptoms initially started in mid December 2022  with left knee pain and was seen at  at an urgent care and had left knees x-rays that were unremarkable and was prescribed NSAID,  subsequently started having back pain  and visited [facility] ED on 12/20/2022 and was diagnosed with lumbosacral strain and prescribed NSAID, tramadol and baclofen.  His mid lower back pain continued to get worse and over the past week started having nonproductive cough upon waking from sleep in the morning and had noticed that whenever he coughs his back pain worsens and radiates bilaterally to his abdomen.  Denies loss of bowel or bladder control or lower extremity weakness. Denies shortness breath, chest pain, fever or chills until he arrived to [facility] ED today on 1/6/2023 where he found to be febrile 102.7, tachycardic and hypertensive.  His labs notable for WBC 12.3, ESR 63, CRP 89.  COVID 19 and flu negative. CTA chest show no pulmonary embolus or parenchymal lung disease, irregularity of T8 inferior endplate may relate to degenerative change but cannot exclude discitis/osteomyelitis.  CT lumbar spine showed degenerative changes and spondylolisthesis at L5-S1. He was given IV fluid, ceftriaxone and vancomycin and  transferred to [facility] on 1/16/23 and referred to hospital medicine service for further evaluation and management.    Patient had MRI TS done and showed osteomyelitis/diskitis of T8-T9. Blood cultures came back positive for Streptococcus. He was continued on iv Vancomycin. LAXMI showed a large MV thrombus and severe MR. CT surgery and [facility] team was consulted.     Interval Hx:   Patient today awake and comfortable. Denies any SOB or chest pain. Has been  afebrile.     Objective/physical exam:  General: In no acute distress, afebrile  Chest: Clear to auscultation bilaterally  Heart: RRR, +S1, S2, Loud systolic murmur   Abdomen: Soft, nontender, BS +  MSK: Warm, no lower extremity edema, no clubbing or cyanosis  Neurologic: Alert and oriented x4, Cranial nerve II-XII intact, Strength 5/5 in all 4 extremities    VITAL SIGNS: 24 HRS MIN & MAX LAST   Temp  Min: 98.4 °F (36.9 °C)  Max: 98.7 °F (37.1 °C) 98.7 °F (37.1 °C)   BP  Min: 132/84  Max: 150/92 (!) 145/90   Pulse  Min: 68  Max: 82  77   Resp  Min: 18  Max: 18 18   SpO2  Min: 98 %  Max: 100 % 99 %       Recent Labs   Lab 01/17/23  0405 01/18/23  0356 01/19/23  0432   WBC 7.9 7.1 7.5   RBC 4.18* 3.98* 3.99*   HGB 11.4* 10.7* 10.8*   HCT 33.9* 32.7* 33.1*   MCV 81.1 82.2 83.0   MCH 27.3 26.9 27.1   MCHC 33.6 32.7* 32.6*   RDW 13.9 13.6 13.6    229 238   MPV 11.4* 10.4 10.6*       Recent Labs   Lab 01/16/23  1315 01/17/23  0405 01/18/23  0356 01/19/23  0432    137 137 138   K 4.2 3.9 4.2 3.9   CO2 26 24 24 26   BUN 11.8 12.5 9.7 9.1   CREATININE 1.19* 0.88 0.91 0.89   CALCIUM 9.6 9.1 8.6 9.0   MG  --  2.00  --  1.90   ALBUMIN 3.1*  --   --   --    ALKPHOS 85  --   --   --    ALT 31  --   --   --    AST 25  --   --   --    BILITOT 0.6  --   --   --           Microbiology Results (last 7 days)       Procedure Component Value Units Date/Time    Chlamydia/GC, PCR [[id number]]  (Normal) Collected: 01/17/23 1002    Order Status: Completed Specimen: Urine Updated: 01/17/23 1207     Chlamydia trachomatis PCR Not Detected     N. gonorrhea PCR Not Detected    Narrative:      The Xpert CT/NG test, performed on the GeneXpert system is a qualitative in vitro real-time polymerase chain reaction (PCR) test for the automated detected and differentiation for genomic DNA from Chlamydia trachomatis (CT) and/or Neisseria gonorrhoeae (NG).             See below for Radiology    Scheduled Med:   ceFEPime (MAXIPIME) IVPB  2 g  Intravenous Q8H    vancomycin (VANCOCIN) IVPB  1,250 mg Intravenous Q12H        Continuous Infusions:   sodium chloride 0.9% 100 mL (01/19/23 1445)        PRN Meds:  acetaminophen, acetaminophen, aluminum-magnesium hydroxide-simethicone, diphenhydrAMINE, fentaNYL, hydrALAZINE, HYDROcodone-acetaminophen, iopamidoL, LIDOcaine (PF) 10 mg/ml (1%), melatonin, methocarbamoL, midazolam, morphine, ondansetron, polyethylene glycol, prochlorperazine, senna-docusate 8.6-50 mg, simethicone, sodium chloride 0.9%, Pharmacy to dose Vancomycin consult **AND** vancomycin - pharmacy to dose       Assessment/Plan:  Sepsis + Streptococcus sanguinis   T spine diskitis/Osteomyelitis   MV Infective endocarditis  Severe MR   Anemia, NC/NC    Plan:  Patient has no new issues  Planned for [facility] today per [facility] team   Seen by CT surgery team, will f/u on final recommendations   LAXMI showed large MV vegetation and MR   Switch iv abx to Rocephin 2 gm q 12 hrs, f/u on ID team recommendations   Cont supportive care   Will closely monitor patients daily weight, urine out put, renal parameters and volume status    Seen by neurosurgery team and recommended no surgery now       Critical care note:  Critical care diagnosis: Sepsis needing iv antibiotics   Critical care interventions: Hands-on evaluation, review of labs/radiographs/records and discussion with patient and family if present  Critical care time spent: 35 minutes     VTE prophylaxis: Lovenox     Patient condition:  Guarded    Anticipated discharge and Disposition:         All diagnosis and differential diagnosis have been reviewed; assessment and plan has been documented; I have personally reviewed the labs and test results that are presently available; I have reviewed the patients medication list; I have reviewed the consulting providers response and recommendations. I have reviewed or attempted to review medical records based upon their availability    All of the patient's questions have been   addressed and answered. Patient's is agreeable to the above stated plan. I will continue to monitor closely and make adjustments to medical management as needed.  _____________________________________________________________________    Nutrition Status:    Radiology:  Transesophageal echo (LAXMI)  Procedure: LAXMI     Final impression:    Large MV vegetation associated with valve perforation and severe MR.  No other vegetations noted affecting the AV, TV and PV.  LV systolic function 60-65%.  No intracardiac thrombus is present.    Recommendations:    CT surgery consult   [facility] prior to MV replacement    Indication: bacteremia, systolic murmur    Informed consent: obtained, signed copy placed in the chart.    Description of the procedure: After sedation was achieved (please see   anesthesia report for details), the esophagus intubated without   difficulties. Multiple orthogonal views obtained. Patient tolerated   procedure without immediate complications noted.    Findings:  Left atrium (LA): Normal LA size.  Left atrial appendage (JEN): No JEN thrombus is present.  Interatrial septum:  NO ASD or PFO noted on 2D or Color Doppler images.   Right atrium (RA): Normal RA size.  Left ventricle (LV): LVEF 60-65%.   Normal LV size.  Normal LV wall   thickness. No Regional wall motion abnormalities noted.  Right ventricle (RV): Partially visualized in this study.  Aortic valve: Trileaflet.  No Aortic stenosis.  Trace-mild aortic   regurgitation. No Vegetation is present.  Mitral valve: Large MV vegetation associated with valve perforation and   severe MR.  Tricuspid valve: Partially visualized, No Tricuspid stenosis. Trace   Tricuspid regurgitation. No Vegetation is present.  Pulmonic valve: Not well visualized, no hemodynamically significant   pulmonic stenosis, no pulmonic regurgitation noted in this study. No   Vegetation is present.  Thoracic aorta: Not well visualized in this study.  Pericardium: No significant pericardial  effusion is present.  Echo  · Concentric hypertrophy and normal systolic function.  · The estimated ejection fraction is 61%.  · Normal left ventricular diastolic function.  · Normal right ventricular size.  · Mild left atrial enlargement.  · Severe mitral regurgitation.  · There is anterior mitral leaflet vegetation.  · Moderate tricuspid regurgitation.  · Elevated central venous pressure (15 mmHg).  · The estimated PA systolic pressure is 49 mmHg.  · There is pulmonary hypertension.         Cash Martinez MD   01/19/2023

## 2023-01-19 NOTE — PROGRESS NOTES
[facility] - 8th Floor Med Surg  Cardiology  Progress Note    Patient Name: Flako Power  MRN: [id number]  Admission Date: 1/16/2023  Hospital Length of Stay: 3 days  Code Status: Full Code   Attending Physician: Cash Martinez MD   Primary Care Physician: Primary Doctor No  Expected Discharge Date:   Principal Problem:<principal problem not specified>    Subjective:   Chief Complaint:  Reason for consult: LAXMI      HPI:   Mr. Power is a 52 year old male who is unknown to [facility]. He presents to [facility] ED on 1.16.23 with complaints of back pain that began mid December 2022 and left knee pain. He reports that the pain beacme progressively worse that began to radiate bilaterally to his abdomen with an accompanying nonproductive cough. He denies CP, SOB, palps, fever, or chills. On arrival to the ER, he was found to be febrile (102.7 F), tachycardic, & hypertensive. Sginificant labs include WBC 12.3, ESR 63, CRP 89. CTA chest demonstrated an irregularity of T8 inferior endplate that may relate to degenerative changes but cannot exclude discitis/osteomyelitis. His initial blood cultures are positive. [facility] has been consulted to perform a LAXMI to further evaluate for possible MV endocarditis.     Hospital Course:  1.18.23: NAD Noted. Vitals Stable. LAXMI Performed with noted MV Endocarditis and Severe MR.  1.19.23: NAD Noted. Patient is NPO for Diagnostic LHC Today.     PMH: Denies  PSH: Denies  Family History: Noncontributory   Social History: Denies alcohol, tobacco, or illicit drug use.      Previous Cardiac Diagnostics:   LAXMI (1.18.23):  Large MV vegetation associated with valve perforation and severe MR.  No other vegetations noted affecting the AV, TV and PV.  LV systolic function 60-65%.  No intracardiac thrombus is present.    Echocardiogram (1.17.23):  Concentric hypertrophy and normal systolic function.  The estimated ejection fraction is 61%.  Normal left ventricular diastolic function.  Normal  right ventricular size.  Mild left atrial enlargement.  Severe mitral regurgitation.  There is anterior mitral leaflet vegetation.  Moderate tricuspid regurgitation.  Elevated central venous pressure (15 mmHg).  The estimated PA systolic pressure is 49 mmHg.  There is pulmonary hypertension.    Review of Systems   Cardiovascular:  Negative for chest pain and dyspnea on exertion.   Respiratory:  Negative for shortness of breath.    All other systems reviewed and are negative.    Objective:     Vital Signs (Most Recent):  Temp: 98.7 °F (37.1 °C) (01/19/23 1116)  Pulse: 77 (01/19/23 1116)  Resp: 18 (01/19/23 1116)  BP: (!) 145/90 (01/19/23 1116)  SpO2: 99 % (01/19/23 1116) Vital Signs (24h Range):  Temp:  [98.4 °F (36.9 °C)-98.7 °F (37.1 °C)] 98.7 °F (37.1 °C)  Pulse:  [68-82] 77  Resp:  [18] 18  SpO2:  [98 %-100 %] 99 %  BP: (132-150)/(82-92) 145/90     Weight: 57.6 kg (127 lb)  Body mass index is 25.65 kg/m².    SpO2: 99 %         Intake/Output Summary (Last 24 hours) at 1/19/2023 1140  Last data filed at 1/19/2023 0713  Gross per 24 hour   Intake 1700 ml   Output 1000 ml   Net 700 ml         Lines/Drains/Airways       Peripheral Intravenous Line  Duration                  Peripheral IV - Single Lumen 01/16/23 2330 Posterior;Right Wrist 2 days                    Significant Labs:   Recent Results (from the past 72 hour(s))   COVID/FLU A&B PCR    Collection Time: 01/16/23 11:46 AM   Result Value Ref Range    Influenza A PCR Not Detected Not Detected    Influenza B PCR Not Detected Not Detected    SARS-CoV-2 PCR Not Detected Not Detected, Negative, Invalid   Urinalysis, Reflex to Urine Culture Urine, Clean Catch    Collection Time: 01/16/23  1:11 PM    Specimen: Urine   Result Value Ref Range    Color, UA Yellow Yellow, Light-Yellow, Dark Yellow, Ashlie, Straw    Appearance, UA Clear Clear    Specific Gravity, UA 1.018     pH, UA 5.5 5.0 - 8.5    Protein, UA Trace (A) Negative mg/dL    Glucose, UA Normal Negative,  Normal mg/dL    Ketones, UA 1+ (A) Negative mg/dL    Blood, UA Trace (A) Negative unit/L    Bilirubin, UA Negative Negative mg/dL    Urobilinogen, UA Normal 0.2, 1.0, Normal mg/dL    Nitrites, UA Negative Negative    Leukocyte Esterase, UA Negative Negative unit/L    WBC, UA 0-5 None Seen, 0-2, 3-5, 0-5 /HPF    Bacteria, UA None Seen None Seen /HPF    Squamous Epithelial Cells, UA None Seen None Seen /HPF    Mucous, UA Trace (A) None Seen /LPF    Hyaline Casts, UA None Seen None Seen /lpf    RBC, UA 0-5 None Seen, 0-2, 3-5, 0-5 /HPF   Comprehensive metabolic panel    Collection Time: 01/16/23  1:15 PM   Result Value Ref Range    Sodium Level 138 136 - 145 mmol/L    Potassium Level 4.2 3.5 - 5.1 mmol/L    Chloride 102 98 - 107 mmol/L    Carbon Dioxide 26 22 - 29 mmol/L    Glucose Level 100 74 - 100 mg/dL    Blood Urea Nitrogen 11.8 8.4 - 25.7 mg/dL    Creatinine 1.19 (H) 0.73 - 1.18 mg/dL    Calcium Level Total 9.6 8.4 - 10.2 mg/dL    Protein Total 8.5 (H) 6.4 - 8.3 gm/dL    Albumin Level 3.1 (L) 3.5 - 5.0 g/dL    Globulin 5.4 (H) 2.4 - 3.5 gm/dL    Albumin/Globulin Ratio 0.6 (L) 1.1 - 2.0 ratio    Bilirubin Total 0.6 <=1.5 mg/dL    Alkaline Phosphatase 85 40 - 150 unit/L    Alanine Aminotransferase 31 0 - 55 unit/L    Aspartate Aminotransferase 25 5 - 34 unit/L    eGFR >60 mls/min/1.73/m2   Lactic acid, plasma    Collection Time: 01/16/23  1:15 PM   Result Value Ref Range    Lactic Acid Level 1.3 0.5 - 2.2 mmol/L   CBC with Differential    Collection Time: 01/16/23  1:15 PM   Result Value Ref Range    WBC 12.3 (H) 4.5 - 11.5 x10(3)/mcL    RBC 4.13 (L) 4.70 - 6.10 x10(6)/mcL    Hgb 11.3 (L) 14.0 - 18.0 gm/dL    Hct 34.4 (L) 42.0 - 52.0 %    MCV 83.3 80.0 - 94.0 fL    MCH 27.4 pg    MCHC 32.8 (L) 33.0 - 36.0 mg/dL    RDW 13.8 11.5 - 17.0 %    Platelet 242 130 - 400 x10(3)/mcL    MPV 9.9 7.4 - 10.4 fL    Neut % 85.8 %    Lymph % 7.3 %    Mono % 6.5 %    Eos % 0.0 %    Basophil % 0.2 %    Lymph # 0.90 0.6 - 4.6  x10(3)/mcL    Neut # 10.54 (H) 2.1 - 9.2 x10(3)/mcL    Mono # 0.80 0.1 - 1.3 x10(3)/mcL    Eos # 0.00 0 - 0.9 x10(3)/mcL    Baso # 0.03 0 - 0.2 x10(3)/mcL    IG# 0.03 0 - 0.04 x10(3)/mcL    IG% 0.2 %    NRBC% 0.0 %   Sedimentation Rate    Collection Time: 01/16/23  1:16 PM   Result Value Ref Range    Sed Rate 63 (H) 0 - 15 mm/hr   C-reactive protein    Collection Time: 01/16/23  1:16 PM   Result Value Ref Range    C-Reactive Protein 89.80 (H) <5.00 mg/L   Blood Culture #1 **CANNOT BE ORDERED STAT**    Collection Time: 01/16/23  2:52 PM    Specimen: Arm, Right; Blood   Result Value Ref Range    CULTURE, BLOOD ([facility]) Streptococcus sanguinis (A)     GRAM STAIN Gram Positive Cocci, probable Streptococcus (AA)     GRAM STAIN Seen in gram stain of broth only (AA)     GRAM STAIN 2 of 2 bottles positive (AA)    Blood Culture #1 **CANNOT BE ORDERED STAT**    Collection Time: 01/16/23  2:52 PM    Specimen: Hand, Right; Blood   Result Value Ref Range    CULTURE, BLOOD ([facility]) Streptococcus sanguinis (A)     GRAM STAIN Gram Positive Cocci, probable Streptococcus (AA)     GRAM STAIN Seen in gram stain of broth only (AA)     GRAM STAIN 2 of 2 bottles positive (AA)    APTT    Collection Time: 01/17/23  4:04 AM   Result Value Ref Range    PTT 33.3 23.2 - 33.7 seconds   Protime-INR    Collection Time: 01/17/23  4:04 AM   Result Value Ref Range    PT 15.0 (H) 12.5 - 14.5 seconds    INR 1.20 0.00 - 1.30   CBC Without Differential    Collection Time: 01/17/23  4:05 AM   Result Value Ref Range    WBC 7.9 4.5 - 11.5 x10(3)/mcL    RBC 4.18 (L) 4.70 - 6.10 x10(6)/mcL    Hgb 11.4 (L) 14.0 - 18.0 gm/dL    Hct 33.9 (L) 42.0 - 52.0 %    Platelet 238 130 - 400 x10(3)/mcL    MCV 81.1 80.0 - 94.0 fL    MCH 27.3 pg    MCHC 33.6 33.0 - 36.0 mg/dL    RDW 13.9 11.5 - 17.0 %    MPV 11.4 (H) 7.4 - 10.4 fL    NRBC% 0.0 %   Basic Metabolic Panel    Collection Time: 01/17/23  4:05 AM   Result Value Ref Range    Sodium Level 137 136 - 145 mmol/L    Potassium  Level 3.9 3.5 - 5.1 mmol/L    Chloride 103 98 - 107 mmol/L    Carbon Dioxide 24 22 - 29 mmol/L    Glucose Level 89 74 - 100 mg/dL    Blood Urea Nitrogen 12.5 8.4 - 25.7 mg/dL    Creatinine 0.88 0.73 - 1.18 mg/dL    BUN/Creatinine Ratio 14     Calcium Level Total 9.1 8.4 - 10.2 mg/dL    Anion Gap 10.0 mEq/L    eGFR >60 mls/min/1.73/m2   Magnesium    Collection Time: 01/17/23  4:05 AM   Result Value Ref Range    Magnesium Level 2.00 1.60 - 2.60 mg/dL   Phosphorus    Collection Time: 01/17/23  4:05 AM   Result Value Ref Range    Phosphorus Level 3.4 2.3 - 4.7 mg/dL   Hepatitis Panel, Acute    Collection Time: 01/17/23  4:05 AM   Result Value Ref Range    Hepatitis A IgM Nonreactive Nonreactive    Hepatitis B Core IgM Nonreactive Nonreactive    Hepatitis B Surface Antigen Nonreactive Nonreactive    Hepatitis C Antibody Nonreactive Nonreactive   HIV 1/2 Ag/Ab (4th Gen)    Collection Time: 01/17/23  4:05 AM   Result Value Ref Range    HIV Nonreactive Nonreactive   SYPHILIS ANTIBODY (WITH REFLEX RPR)    Collection Time: 01/17/23  4:05 AM   Result Value Ref Range    Syphilis Antibody Nonreactive Nonreactive, Equivocal   Hemoglobin A1C    Collection Time: 01/17/23  4:05 AM   Result Value Ref Range    Hemoglobin A1c 5.6 <=7.0 %    Estimated Average Glucose 114.0 mg/dL   Pathologist Interpretation    Collection Time: 01/17/23  4:05 AM   Result Value Ref Range    Pathology Review       No serological evidence of recent or past hepatitis A, B, or C infection.    Christiano Looney M.D.      Drug Screen, Urine    Collection Time: 01/17/23  4:06 AM   Result Value Ref Range    Amphetamines, Urine Negative Negative    Barbituates, Urine Negative Negative    Benzodiazepine, Urine Negative Negative    Cannabinoids, Urine Negative Negative    Cocaine, Urine Negative Negative    Fentanyl, Urine Negative Negative    MDMA, Urine Negative Negative    Opiates, Urine Negative Negative    Phencyclidine, Urine Negative Negative    pH, Urine  5.5 3.0 - 11.0    Specific Gravity, Urine Auto 1.025 1.001 - 1.035   MRSA PCR    Collection Time: 01/17/23  4:06 AM   Result Value Ref Range    MRSA PCR SCRN ([facility]) Not Detected Not Detected   Echo    Collection Time: 01/17/23  9:37 AM   Result Value Ref Range    BSA 1.55 m2    TDI SEPTAL 0.12 m/s    LV LATERAL E/E' RATIO 7.79 m/s    LV SEPTAL E/E' RATIO 12.33 m/s    Right Atrial Pressure (from IVC) 15 mmHg    EF 61 %    Left Ventricular Outflow Tract Mean Velocity 0.89 cm/s    Left Ventricular Outflow Tract Mean Gradient 4.00 mmHg    TDI LATERAL 0.19 m/s    PV PEAK VELOCITY 1.03 cm/s    LVIDd 5.60 3.5 - 6.0 cm    IVS 1.03 0.6 - 1.1 cm    Posterior Wall 1.38 (A) 0.6 - 1.1 cm    LVIDs 3.72 2.1 - 4.0 cm    FS 34 28 - 44 %    LV mass 282.07 g    LA size 4.10 cm    RVDD 2.06 cm    Left Ventricle Relative Wall Thickness 0.49 cm    AV mean gradient 15 mmHg    AV valve area 1.93 cm2    AV Velocity Ratio 0.53     AV index (prosthetic) 0.56     MV mean gradient 9 mmHg    MV valve area p 1/2 method 3.44 cm2    MV valve area by continuity eq 1.65 cm2    E/A ratio 1.17     Mean e' 0.16 m/s    E wave deceleration time 158.00 msec    LVOT diameter 2.10 cm    LVOT area 3.5 cm2    LVOT peak kyle 1.43 m/s    LVOT peak VTI 22.50 cm    Ao peak kyle 2.68 m/s    Ao VTI 40.4 cm    LVOT stroke volume 77.89 cm3    AV peak gradient 29 mmHg    MV peak gradient 22 mmHg    TV rest pulmonary artery pressure 49 mmHg    E/E' ratio 9.55 m/s    MV Peak E Kyle 1.48 m/s    TR Max Kyle 2.90 m/s    MV VTI 47.1 cm    MV stenosis pressure 1/2 time 64.00 ms    MV Peak A Kyle 1.26 m/s    LV Systolic Volume 58.90 mL    LV Systolic Volume Index 38.8 mL/m2    LV Diastolic Volume 154.00 mL    LV Diastolic Volume Index 101.32 mL/m2    LV Mass Index 186 g/m2    Triscuspid Valve Regurgitation Peak Gradient 34 mmHg    LA Volume Index (Mod) 85.5 mL/m2    LA volume (mod) 130.00 cm3   Chlamydia/GC, PCR    Collection Time: 01/17/23 10:02 AM    Specimen: Urine   Result Value  Ref Range    Chlamydia trachomatis PCR Not Detected Not Detected    N. gonorrhea PCR Not Detected Not Detected   VANCOMYCIN, TROUGH    Collection Time: 01/18/23  3:56 AM   Result Value Ref Range    Vancomycin Trough 7.4 (L) 15.0 - 20.0 ug/ml   Basic Metabolic Panel    Collection Time: 01/18/23  3:56 AM   Result Value Ref Range    Sodium Level 137 136 - 145 mmol/L    Potassium Level 4.2 3.5 - 5.1 mmol/L    Chloride 104 98 - 107 mmol/L    Carbon Dioxide 24 22 - 29 mmol/L    Glucose Level 93 74 - 100 mg/dL    Blood Urea Nitrogen 9.7 8.4 - 25.7 mg/dL    Creatinine 0.91 0.73 - 1.18 mg/dL    BUN/Creatinine Ratio 11     Calcium Level Total 8.6 8.4 - 10.2 mg/dL    Anion Gap 9.0 mEq/L    eGFR >60 mls/min/1.73/m2   CBC with Differential    Collection Time: 01/18/23  3:56 AM   Result Value Ref Range    WBC 7.1 4.5 - 11.5 x10(3)/mcL    RBC 3.98 (L) 4.70 - 6.10 x10(6)/mcL    Hgb 10.7 (L) 14.0 - 18.0 gm/dL    Hct 32.7 (L) 42.0 - 52.0 %    MCV 82.2 80.0 - 94.0 fL    MCH 26.9 pg    MCHC 32.7 (L) 33.0 - 36.0 mg/dL    RDW 13.6 11.5 - 17.0 %    Platelet 229 130 - 400 x10(3)/mcL    MPV 10.4 7.4 - 10.4 fL    Neut % 73.4 %    Lymph % 14.4 %    Mono % 9.5 %    Eos % 2.0 %    Basophil % 0.4 %    Lymph # 1.03 0.6 - 4.6 x10(3)/mcL    Neut # 5.23 2.1 - 9.2 x10(3)/mcL    Mono # 0.68 0.1 - 1.3 x10(3)/mcL    Eos # 0.14 0 - 0.9 x10(3)/mcL    Baso # 0.03 0 - 0.2 x10(3)/mcL    IG# 0.02 0 - 0.04 x10(3)/mcL    IG% 0.3 %    NRBC% 0.0 %   Transesophageal echo (LAXMI)    Collection Time: 01/18/23 10:18 AM   Result Value Ref Range    BSA 1.55 m2    Mr max lanre 5.82 m/s   VANCOMYCIN, TROUGH    Collection Time: 01/19/23  4:32 AM   Result Value Ref Range    Vancomycin Trough 8.7 (L) 15.0 - 20.0 ug/ml   Basic Metabolic Panel    Collection Time: 01/19/23  4:32 AM   Result Value Ref Range    Sodium Level 138 136 - 145 mmol/L    Potassium Level 3.9 3.5 - 5.1 mmol/L    Chloride 103 98 - 107 mmol/L    Carbon Dioxide 26 22 - 29 mmol/L    Glucose Level 92 74 - 100  mg/dL    Blood Urea Nitrogen 9.1 8.4 - 25.7 mg/dL    Creatinine 0.89 0.73 - 1.18 mg/dL    BUN/Creatinine Ratio 10     Calcium Level Total 9.0 8.4 - 10.2 mg/dL    Anion Gap 9.0 mEq/L    eGFR >60 mls/min/1.73/m2   Magnesium    Collection Time: 01/19/23  4:32 AM   Result Value Ref Range    Magnesium Level 1.90 1.60 - 2.60 mg/dL   CBC with Differential    Collection Time: 01/19/23  4:32 AM   Result Value Ref Range    WBC 7.5 4.5 - 11.5 x10(3)/mcL    RBC 3.99 (L) 4.70 - 6.10 x10(6)/mcL    Hgb 10.8 (L) 14.0 - 18.0 gm/dL    Hct 33.1 (L) 42.0 - 52.0 %    MCV 83.0 80.0 - 94.0 fL    MCH 27.1 pg    MCHC 32.6 (L) 33.0 - 36.0 mg/dL    RDW 13.6 11.5 - 17.0 %    Platelet 238 130 - 400 x10(3)/mcL    MPV 10.6 (H) 7.4 - 10.4 fL    Neut % 69.0 %    Lymph % 19.8 %    Mono % 8.3 %    Eos % 2.1 %    Basophil % 0.5 %    Lymph # 1.49 0.6 - 4.6 x10(3)/mcL    Neut # 5.18 2.1 - 9.2 x10(3)/mcL    Mono # 0.62 0.1 - 1.3 x10(3)/mcL    Eos # 0.16 0 - 0.9 x10(3)/mcL    Baso # 0.04 0 - 0.2 x10(3)/mcL    IG# 0.02 0 - 0.04 x10(3)/mcL    IG% 0.3 %    NRBC% 0.0 %     Telemetry: Sinus Bradycardia    Physical Exam  Vitals reviewed.   Constitutional:       General: He is not in acute distress.     Appearance: Normal appearance. He is not ill-appearing.   HENT:      Head: Normocephalic.      Mouth/Throat:      Mouth: Mucous membranes are moist.      Pharynx: Oropharynx is clear.   Cardiovascular:      Rate and Rhythm: Regular rhythm. Bradycardia present.      Heart sounds: Murmur heard.   Pulmonary:      Effort: Pulmonary effort is normal. No respiratory distress.      Breath sounds: Normal breath sounds.      Comments: On Room Air  Abdominal:      General: There is no distension.      Palpations: Abdomen is soft.      Tenderness: There is no abdominal tenderness.   Musculoskeletal:         General: Normal range of motion.      Cervical back: Neck supple.      Right lower leg: No edema.      Left lower leg: No edema.   Skin:     General: Skin is warm and  dry.   Neurological:      General: No focal deficit present.      Mental Status: He is alert and oriented to person, place, and time. Mental status is at baseline.   Psychiatric:         Mood and Affect: Mood normal.         Behavior: Behavior normal.       Current Inpatient Medications:    Current Facility-Administered Medications:     acetaminophen tablet 1,000 mg, 1,000 mg, Oral, Q6H PRN, Ovidio Trivedi MD    acetaminophen tablet 650 mg, 650 mg, Oral, Q4H PRN, Ovidio Trivedi MD    aluminum-magnesium hydroxide-simethicone 200-200-20 mg/5 mL suspension 30 mL, 30 mL, Oral, QID PRN, Ovidio Trivedi MD    ceFEPIme (MAXIPIME) 2 g in dextrose 5 % in water (D5W) 5 % 50 mL IVPB (MB+), 2 g, Intravenous, Q8H, Ovidio Trivedi MD, Stopped at 01/19/23 0830    HYDROcodone-acetaminophen 5-325 mg per tablet 1 tablet, 1 tablet, Oral, Q6H PRN, Ovidio Trivedi MD    lactated ringers infusion, , Intravenous, Continuous, Cash Martinez MD, Last Rate: 75 mL/hr at 01/17/23 1548, Rate Change at 01/17/23 1548    melatonin tablet 6 mg, 6 mg, Oral, Nightly PRN, Ovidio Trivedi MD    methocarbamoL tablet 500 mg, 500 mg, Oral, QID PRN, Ovidio Trivedi MD, 500 mg at 01/17/23 0035    morphine injection 2 mg, 2 mg, Intravenous, Q4H PRN, Ovidio Trivedi MD    ondansetron injection 4 mg, 4 mg, Intravenous, Q4H PRN, Ovidio Trivedi MD, 4 mg at 01/17/23 0035    polyethylene glycol packet 17 g, 17 g, Oral, BID PRN, Ovidio Trivedi MD    prochlorperazine injection Soln 5 mg, 5 mg, Intravenous, Q6H PRN, Ovidio Trivedi MD    senna-docusate 8.6-50 mg per tablet 2 tablet, 2 tablet, Oral, BID PRN, Ovidio Trivedi MD    simethicone chewable tablet 80 mg, 1 tablet, Oral, QID PRN, Ovidio Trivedi MD    sodium chloride 0.9% flush 10 mL, 10 mL, Intravenous, PRN, Ovidio Trivedi MD    Pharmacy to dose Vancomycin consult, , , Once **AND** vancomycin - pharmacy to dose, , Intravenous, pharmacy to manage frequency, Ovidio Trivedi MD    vancomycin 1.25 g in dextrose 5% 250 mL IVPB (ready to mix),  1,250 mg, Intravenous, Q12H, Ovidio Trivedi MD    VTE Risk Mitigation (From admission, onward)           Ordered     IP VTE LOW RISK PATIENT  Once         01/16/23 2344     Place sequential compression device  Until discontinued         01/16/23 2344                  Assessment:   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 Patient is NPO.  Risks/Benefits/Alternatives of Coronary Angiogram Discussed with the patient. He Elects to Proceed.  Consent signed and on chart.  CTS Following    JOSE Bethea  Cardiology  [facility] - 8th Floor Med Surg  01/19/2023

## 2023-01-19 NOTE — PROGRESS NOTES
[facility]  Hospital Medicine Progress Note        Chief Complaint: back pain    HPI:    52-year-old male  with no significant past medical history  presented to [facility] ED  on 01/16/2023 with complaint of back pain. His symptoms initially started in mid December 2022 with left knee pain and was seen at an urgent care and had left knee x-rays that were unremarkable and was prescribed NSAID, subsequently started having back pain and visited [facility] ED on 12/20/2022 and was diagnosed with lumbosacral strain and prescribed NSAID, tramadol and baclofen.  His mid lower back pain continued to get worse and over the past week started having nonproductive cough upon waking from sleep in the morning and had noticed that whenever he coughs his back pain worsens and radiates bilaterally to his abdomen.  Denies loss of bowel or bladder control or lower extremity weakness. Denies shortness breath, chest pain, fever or chills.    he arrived to [facility] ED today on 1/6/2023 where he found to be febrile 102.7, tachycardic and hypertensive.  His labs notable for WBC 12.3, ESR 63, CRP 89.  COVID 19 and flu negative. CTA chest show no pulmonary embolus or parenchymal lung disease, irregularity of T8 inferior endplate may relate to degenerative change but cannot exclude discitis/osteomyelitis.  CT lumbar spine showed degenerative changes and spondylolisthesis at L5-S1. He was given IV fluid, ceftriaxone and vancomycin and  transferred to [facility] on 1/16/23 and referred to hospital medicine service for further evaluation and management.    Interval Hx:     Back pain is improved; he denies new or worsening symptoms.   Plan of care discussed at length with patient and 2kids at bedside.     Objective/physical exam:  General: Appears comfortable, no acute distress.  Integumentary: Warm, dry, intact.  Musculoskeletal: Purposeful movement noted.   Respiratory: No accessory muscle use. Breath sounds are equal.  Cardiovascular:  Regular rate. No peripheral edema.    VITAL SIGNS: 24 HRS MIN & MAX LAST   Temp  Min: 98.4 °F (36.9 °C)  Max: 98.7 °F (37.1 °C) 98.7 °F (37.1 °C)   BP  Min: 104/66  Max: 152/94 135/84   Pulse  Min: 68  Max: 96  68   Resp  Min: 0  Max: 25 18   SpO2  Min: 92 %  Max: 100 % 100 %     Transesophageal echo (LAXMI)  Procedure: LAXMI     Final impression:    Large MV vegetation associated with valve perforation and severe MR.  No other vegetations noted affecting the AV, TV and PV.  LV systolic function 60-65%.  No intracardiac thrombus is present.    Recommendations:    CT surgery consult   [facility] prior to MV replacement    Indication: bacteremia, systolic murmur    Informed consent: obtained, signed copy placed in the chart.    Description of the procedure: After sedation was achieved (please see   anesthesia report for details), the esophagus intubated without   difficulties. Multiple orthogonal views obtained. Patient tolerated   procedure without immediate complications noted.    Findings:  Left atrium (LA): Normal LA size.  Left atrial appendage (JEN): No JEN thrombus is present.  Interatrial septum:  NO ASD or PFO noted on 2D or Color Doppler images.   Right atrium (RA): Normal RA size.  Left ventricle (LV): LVEF 60-65%.   Normal LV size.  Normal LV wall   thickness. No Regional wall motion abnormalities noted.  Right ventricle (RV): Partially visualized in this study.  Aortic valve: Trileaflet.  No Aortic stenosis.  Trace-mild aortic   regurgitation. No Vegetation is present.  Mitral valve: Large MV vegetation associated with valve perforation and   severe MR.  Tricuspid valve: Partially visualized, No Tricuspid stenosis. Trace   Tricuspid regurgitation. No Vegetation is present.  Pulmonic valve: Not well visualized, no hemodynamically significant   pulmonic stenosis, no pulmonic regurgitation noted in this study. No   Vegetation is present.  Thoracic aorta: Not well visualized in this study.  Pericardium: No significant  pericardial effusion is present.  Echo  · Concentric hypertrophy and normal systolic function.  · The estimated ejection fraction is 61%.  · Normal left ventricular diastolic function.  · Normal right ventricular size.  · Mild left atrial enlargement.  · Severe mitral regurgitation.  · There is anterior mitral leaflet vegetation.  · Moderate tricuspid regurgitation.  · Elevated central venous pressure (15 mmHg).  · The estimated PA systolic pressure is 49 mmHg.  · There is pulmonary hypertension.       Recent Labs   Lab 01/17/23  0405 01/18/23  0356 01/19/23  0432   WBC 7.9 7.1 7.5   RBC 4.18* 3.98* 3.99*   HGB 11.4* 10.7* 10.8*   HCT 33.9* 32.7* 33.1*   MCV 81.1 82.2 83.0   MCH 27.3 26.9 27.1   MCHC 33.6 32.7* 32.6*   RDW 13.9 13.6 13.6    229 238   MPV 11.4* 10.4 10.6*       Recent Labs   Lab 01/16/23  1315 01/17/23  0405 01/18/23  0356 01/19/23  0432    137 137 138   K 4.2 3.9 4.2 3.9   CO2 26 24 24 26   BUN 11.8 12.5 9.7 9.1   CREATININE 1.19* 0.88 0.91 0.89   CALCIUM 9.6 9.1 8.6 9.0   MG  --  2.00  --  1.90   ALBUMIN 3.1*  --   --   --    ALKPHOS 85  --   --   --    ALT 31  --   --   --    AST 25  --   --   --    BILITOT 0.6  --   --   --           Microbiology Results (last 7 days)       Procedure Component Value Units Date/Time    Chlamydia/GC, PCR [[id number]]  (Normal) Collected: 01/17/23 1002    Order Status: Completed Specimen: Urine Updated: 01/17/23 1207     Chlamydia trachomatis PCR Not Detected     N. gonorrhea PCR Not Detected    Narrative:      The Xpert CT/NG test, performed on the GeneXpert system is a qualitative in vitro real-time polymerase chain reaction (PCR) test for the automated detected and differentiation for genomic DNA from Chlamydia trachomatis (CT) and/or Neisseria gonorrhoeae (NG).             See below for Radiology    Scheduled Med:   ceFEPime (MAXIPIME) IVPB  2 g Intravenous Q8H    vancomycin (VANCOCIN) IVPB  1,250 mg Intravenous Q12H        Continuous Infusions:    lactated ringers 75 mL/hr at 01/17/23 1548        PRN Meds:  acetaminophen, acetaminophen, aluminum-magnesium hydroxide-simethicone, HYDROcodone-acetaminophen, melatonin, methocarbamoL, morphine, ondansetron, polyethylene glycol, prochlorperazine, senna-docusate 8.6-50 mg, simethicone, sodium chloride 0.9%, Pharmacy to dose Vancomycin consult **AND** vancomycin - pharmacy to dose     Nutrition Status:      Assessment/Plan:  T spine diskitis/Osteomyelitis   MV Infective endocarditis  Severe MR   Anemia-unspecified.    Back pain concerning for thoracic spine diskitis--etiology unknown; HIV /syphilis negative  Bacteremia-will need to repeat blood cultures in 24-48 hours, we  Large mitral valve vegetation with valvular perforation and severe mitral regurgitation, EF preserved.--consult placed for ID physician  CT surgeon's recommendations pending.  Remain NPO for now in anticipation for further studies.      Anticipated discharge and Disposition:  Pending.       All diagnosis and differential diagnosis have been reviewed,  interpreted and communicated appropriately to care team. assessment and plan has been documented; I have personally reviewed the labs and test results that are presently available and pertinent to this hospital course; I have reviewed medical records based upon their availability.    All of the patient's questions have been  addressed and answered. Patient's is agreeable to the above stated plan.   I will continue to monitor closely and make adjustments to medical management as needed.      Alexia Alvarez,    01/19/2023        This note was created with the assistance of Dragon voice recognition software. There may be transcription errors as a result of using this technology however minimal. Effort has been made to assure accuracy of transcription but any obvious errors or omissions should be clarified with the author of the document.

## 2023-01-20 LAB
POCT GLUCOSE: 134 MG/DL (ref 70–110)
POCT GLUCOSE: 99 MG/DL (ref 70–110)
VANCOMYCIN TROUGH SERPL-MCNC: 1.8 UG/ML (ref 15–20)

## 2023-01-20 PROCEDURE — 36415 COLL VENOUS BLD VENIPUNCTURE: CPT | Performed by: INTERNAL MEDICINE

## 2023-01-20 PROCEDURE — 25000003 PHARM REV CODE 250: Performed by: NURSE PRACTITIONER

## 2023-01-20 PROCEDURE — 80202 ASSAY OF VANCOMYCIN: CPT | Performed by: INTERNAL MEDICINE

## 2023-01-20 PROCEDURE — 21400001 HC TELEMETRY ROOM

## 2023-01-20 PROCEDURE — 11000001 HC ACUTE MED/SURG PRIVATE ROOM

## 2023-01-20 PROCEDURE — 87040 BLOOD CULTURE FOR BACTERIA: CPT | Performed by: INTERNAL MEDICINE

## 2023-01-20 PROCEDURE — 63600175 PHARM REV CODE 636 W HCPCS: Performed by: INTERNAL MEDICINE

## 2023-01-20 RX ORDER — AMLODIPINE BESYLATE 5 MG/1
5 TABLET ORAL DAILY
Status: DISCONTINUED | OUTPATIENT
Start: 2023-01-20 | End: 2023-01-25 | Stop reason: HOSPADM

## 2023-01-20 RX ADMIN — AMLODIPINE BESYLATE 5 MG: 5 TABLET ORAL at 11:01

## 2023-01-20 RX ADMIN — CEFTRIAXONE 2 G: 2 INJECTION, SOLUTION INTRAVENOUS at 04:01

## 2023-01-20 RX ADMIN — CEFTRIAXONE 2 G: 2 INJECTION, SOLUTION INTRAVENOUS at 03:01

## 2023-01-20 NOTE — PROGRESS NOTES
[facility]  Hospital Medicine Progress Note        Chief Complaint: Inpatient Follow-up for T spine diskitis     HPI:   This is a 52-year-old male  with no significant past medical history  presented to [facility] ED  on 01/16/2023 with complaint of back pain. His  symptoms initially started in mid December 2022  with left knee pain and was seen at  at an urgent care and had left knees x-rays that were unremarkable and was prescribed NSAID,  subsequently started having back pain  and visited [facility] ED on 12/20/2022 and was diagnosed with lumbosacral strain and prescribed NSAID, tramadol and baclofen.  His mid lower back pain continued to get worse and over the past week started having nonproductive cough upon waking from sleep in the morning and had noticed that whenever he coughs his back pain worsens and radiates bilaterally to his abdomen.  Denies loss of bowel or bladder control or lower extremity weakness. Denies shortness breath, chest pain, fever or chills until he arrived to [facility] ED today on 1/6/2023 where he found to be febrile 102.7, tachycardic and hypertensive.  His labs notable for WBC 12.3, ESR 63, CRP 89.  COVID 19 and flu negative. CTA chest show no pulmonary embolus or parenchymal lung disease, irregularity of T8 inferior endplate may relate to degenerative change but cannot exclude discitis/osteomyelitis.  CT lumbar spine showed degenerative changes and spondylolisthesis at L5-S1. He was given IV fluid, ceftriaxone and vancomycin and  transferred to [facility] on 1/16/23 and referred to hospital medicine service for further evaluation and management.    Patient had MRI TS done and showed osteomyelitis/diskitis of T8-T9. Blood cultures came back positive for Streptococcus. He was continued on iv Vancomycin. LAXMI showed a large MV thrombus and severe MR. CT surgery and [facility] team was consulted.   LHC done and showed no acute blockages.     Interval Hx:   Patient today awake and  comfortable. No new issues. Ambulating well. Afebrile.     Objective/physical exam:  General: In no acute distress, afebrile  Chest: Clear to auscultation bilaterally  Heart: RRR, +S1, S2, Loud systolic murmur   Abdomen: Soft, nontender, BS +  MSK: Warm, no lower extremity edema, no clubbing or cyanosis  Neurologic: Alert and oriented x4, Cranial nerve II-XII intact, Strength 5/5 in all 4 extremities    VITAL SIGNS: 24 HRS MIN & MAX LAST   Temp  Min: 98.1 °F (36.7 °C)  Max: 98.6 °F (37 °C) 98.1 °F (36.7 °C)   BP  Min: 119/66  Max: 159/87 (!) 139/93   Pulse  Min: 67  Max: 80  75   Resp  Min: 19  Max: 19 19   SpO2  Min: 96 %  Max: 100 % 100 %       Recent Labs   Lab 01/17/23  0405 01/18/23  0356 01/19/23  0432   WBC 7.9 7.1 7.5   RBC 4.18* 3.98* 3.99*   HGB 11.4* 10.7* 10.8*   HCT 33.9* 32.7* 33.1*   MCV 81.1 82.2 83.0   MCH 27.3 26.9 27.1   MCHC 33.6 32.7* 32.6*   RDW 13.9 13.6 13.6    229 238   MPV 11.4* 10.4 10.6*       Recent Labs   Lab 01/16/23  1315 01/17/23  0405 01/18/23  0356 01/19/23  0432    137 137 138   K 4.2 3.9 4.2 3.9   CO2 26 24 24 26   BUN 11.8 12.5 9.7 9.1   CREATININE 1.19* 0.88 0.91 0.89   CALCIUM 9.6 9.1 8.6 9.0   MG  --  2.00  --  1.90   ALBUMIN 3.1*  --   --   --    ALKPHOS 85  --   --   --    ALT 31  --   --   --    AST 25  --   --   --    BILITOT 0.6  --   --   --           Microbiology Results (last 7 days)       Procedure Component Value Units Date/Time    Blood Culture [[id number]]     Order Status: Sent Specimen: Blood     Blood Culture [[id number]]     Order Status: Sent Specimen: Blood     Chlamydia/GC, PCR [[id number]]  (Normal) Collected: 01/17/23 1002    Order Status: Completed Specimen: Urine Updated: 01/17/23 1207     Chlamydia trachomatis PCR Not Detected     N. gonorrhea PCR Not Detected    Narrative:      The Xpert CT/NG test, performed on the GeneXpert system is a qualitative in vitro real-time polymerase chain reaction (PCR) test for the automated detected and  differentiation for genomic DNA from Chlamydia trachomatis (CT) and/or Neisseria gonorrhoeae (NG).             See below for Radiology    Scheduled Med:   amLODIPine  5 mg Oral Daily    cefTRIAXone (ROCEPHIN) IVPB  2 g Intravenous Q12H        Continuous Infusions:         PRN Meds:  acetaminophen, acetaminophen, aluminum-magnesium hydroxide-simethicone, diphenhydrAMINE, fentaNYL, hydrALAZINE, HYDROcodone-acetaminophen, iopamidoL, LIDOcaine (PF) 10 mg/ml (1%), melatonin, methocarbamoL, midazolam, morphine, ondansetron, polyethylene glycol, prochlorperazine, senna-docusate 8.6-50 mg, simethicone, sodium chloride 0.9%       Assessment/Plan:  Sepsis + Streptococcus sanguinis   T spine diskitis/Osteomyelitis   MV Infective endocarditis  Severe MR   Anemia, NC/NC    Plan:  Patient doing well with no acute issues. Has been afebrile.   Seen by CT surgery team, will f/u on final recommendations   LAXMI showed large MV vegetation and MR   Cont Rocephin 2 gm q 12 hrs, f/u on ID team recommendations   Cont supportive care   Will closely monitor patients daily weight, urine out put, renal parameters and volume status    Seen by neurosurgery team and recommended no surgery now       Critical care note:  Critical care diagnosis: Sepsis needing iv antibiotics   Critical care interventions: Hands-on evaluation, review of labs/radiographs/records and discussion with patient and family if present  Critical care time spent: 35 minutes     VTE prophylaxis: Lovenox     Patient condition:  Guarded    Anticipated discharge and Disposition:         All diagnosis and differential diagnosis have been reviewed; assessment and plan has been documented; I have personally reviewed the labs and test results that are presently available; I have reviewed the patients medication list; I have reviewed the consulting providers response and recommendations. I have reviewed or attempted to review medical records based upon their availability    All of the  patient's questions have been  addressed and answered. Patient's is agreeable to the above stated plan. I will continue to monitor closely and make adjustments to medical management as needed.  _____________________________________________________________________    Nutrition Status:    Radiology:  Cardiac catheterization    There was no obstructive coronary artery disease.    The procedure log was documented by No documenter listed and verified by   Speedy Rendon Jr, MD.    Date: 1/19/2023  Time: 5:19 PM    Procedure:   Left heart catheterization     Preoperative diagnosis:  Infective endocarditis    Postoperative diagnosis:  Infective endocarditis     Access:    Right common femoral artery    Estimated blood loss: 10 cc  Complications: None     Summary:  Consent obtained. Risks and benefits discussed with the patient. The   patient was brought to the cath lab in a fasting state. The patient was   prepped and draped in usual sterile fashion. A preprocedure time-out was   performed.     Ultrasound-guided right common femoral arterial access via modified   Seldinger technique using a micropuncture kit. A 6 French sheath was   inserted into the right common femoral artery.     A 5 French JL3.5 catheter was used to cannulate the left main coronary   artery; angiography was performed, and multiple fluoroscopic views were   obtained.  A 5 French JR4 catheter was used to cannulate the right coronary artery;   angiography was performed, and multiple fluoroscopic views were obtained.    At the end the procedure, all wires and catheters were removed.   Hemostasis achieved with manual compression.     Findings:  There was no obstructive coronary artery disease.     Assessment/Plan:  - No obstructive coronary disease. Mgmt of infective endocarditis as per   primary team.    Speedy Rendon MD  Interventional Cardiology/Structural Heart Disease  [facility]      Cash Martinez MD    01/20/2023

## 2023-01-20 NOTE — PROGRESS NOTES
[facility] - 8th Floor Med Surg  Cardiology  Progress Note    Patient Name: Flako Power  MRN: [id number]  Admission Date: 1/16/2023  Hospital Length of Stay: 4 days  Code Status: Full Code   Attending Physician: Cash Martinez MD   Primary Care Physician: Primary Doctor No  Expected Discharge Date:   Principal Problem:Severe mitral valve regurgitation    Subjective:   Chief Complaint:  Reason for consult: LAXMI      HPI:   Mr. Power is a 52 year old male who is unknown to [facility]. He presents to [facility] ED on 1.16.23 with complaints of back pain that began mid December 2022 and left knee pain. He reports that the pain beacme progressively worse that began to radiate bilaterally to his abdomen with an accompanying nonproductive cough. He denies CP, SOB, palps, fever, or chills. On arrival to the ER, he was found to be febrile (102.7 F), tachycardic, & hypertensive. Sginificant labs include WBC 12.3, ESR 63, CRP 89. CTA chest demonstrated an irregularity of T8 inferior endplate that may relate to degenerative changes but cannot exclude discitis/osteomyelitis. His initial blood cultures are positive. [facility] has been consulted to perform a LAXMI to further evaluate for possible MV endocarditis.     Hospital Course:  1.18.23: NAD Noted. Vitals Stable. LAXMI Performed with noted MV Endocarditis and Severe MR.  1.19.23: NAD Noted. Patient is NPO for Diagnostic LHC Today.  1.20.23: Patient sitting at bedside. Denies any complaints. S/p LHC with no obstructive CAD noted. Right groin site soft. No hematoma/ecchymosis noted     PMH: Denies  PSH: Denies  Family History: Noncontributory   Social History: Denies alcohol, tobacco, or illicit drug use.      Previous Cardiac Diagnostics:  LHC 01/19/23:  No obstructive CAD.    LAXMI (1.18.23):  Large MV vegetation associated with valve perforation and severe MR.  No other vegetations noted affecting the AV, TV and PV.  LV systolic function 60-65%.  No intracardiac thrombus is  present.    Echocardiogram (1.17.23):  Concentric hypertrophy and normal systolic function.  The estimated ejection fraction is 61%.  Normal left ventricular diastolic function.  Normal right ventricular size.  Mild left atrial enlargement.  Severe mitral regurgitation.  There is anterior mitral leaflet vegetation.  Moderate tricuspid regurgitation.  Elevated central venous pressure (15 mmHg).  The estimated PA systolic pressure is 49 mmHg.  There is pulmonary hypertension.    Review of Systems   Cardiovascular:  Negative for chest pain and dyspnea on exertion.   Respiratory:  Negative for shortness of breath.    All other systems reviewed and are negative.    Objective:     Vital Signs (Most Recent):  Temp: 98.3 °F (36.8 °C) (01/20/23 0841)  Pulse: 68 (01/20/23 0841)  Resp: 19 (01/20/23 0433)  BP: 134/86 (01/20/23 0841)  SpO2: 99 % (01/20/23 0841) Vital Signs (24h Range):  Temp:  [98.2 °F (36.8 °C)-98.7 °F (37.1 °C)] 98.3 °F (36.8 °C)  Pulse:  [67-80] 68  Resp:  [18-19] 19  SpO2:  [96 %-100 %] 99 %  BP: (119-159)/(66-90) 134/86     Weight: 57.6 kg (127 lb)  Body mass index is 25.65 kg/m².    SpO2: 99 %         Intake/Output Summary (Last 24 hours) at 1/20/2023 1025  Last data filed at 1/20/2023 0648  Gross per 24 hour   Intake 600 ml   Output 1225 ml   Net -625 ml         Lines/Drains/Airways       Peripheral Intravenous Line  Duration                  Peripheral IV - Single Lumen 01/19/23 2145 20 G Left;Posterior Forearm <1 day                    Significant Labs:   Recent Results (from the past 72 hour(s))   VANCOMYCIN, TROUGH    Collection Time: 01/18/23  3:56 AM   Result Value Ref Range    Vancomycin Trough 7.4 (L) 15.0 - 20.0 ug/ml   Basic Metabolic Panel    Collection Time: 01/18/23  3:56 AM   Result Value Ref Range    Sodium Level 137 136 - 145 mmol/L    Potassium Level 4.2 3.5 - 5.1 mmol/L    Chloride 104 98 - 107 mmol/L    Carbon Dioxide 24 22 - 29 mmol/L    Glucose Level 93 74 - 100 mg/dL    Blood Urea  Nitrogen 9.7 8.4 - 25.7 mg/dL    Creatinine 0.91 0.73 - 1.18 mg/dL    BUN/Creatinine Ratio 11     Calcium Level Total 8.6 8.4 - 10.2 mg/dL    Anion Gap 9.0 mEq/L    eGFR >60 mls/min/1.73/m2   CBC with Differential    Collection Time: 01/18/23  3:56 AM   Result Value Ref Range    WBC 7.1 4.5 - 11.5 x10(3)/mcL    RBC 3.98 (L) 4.70 - 6.10 x10(6)/mcL    Hgb 10.7 (L) 14.0 - 18.0 gm/dL    Hct 32.7 (L) 42.0 - 52.0 %    MCV 82.2 80.0 - 94.0 fL    MCH 26.9 pg    MCHC 32.7 (L) 33.0 - 36.0 mg/dL    RDW 13.6 11.5 - 17.0 %    Platelet 229 130 - 400 x10(3)/mcL    MPV 10.4 7.4 - 10.4 fL    Neut % 73.4 %    Lymph % 14.4 %    Mono % 9.5 %    Eos % 2.0 %    Basophil % 0.4 %    Lymph # 1.03 0.6 - 4.6 x10(3)/mcL    Neut # 5.23 2.1 - 9.2 x10(3)/mcL    Mono # 0.68 0.1 - 1.3 x10(3)/mcL    Eos # 0.14 0 - 0.9 x10(3)/mcL    Baso # 0.03 0 - 0.2 x10(3)/mcL    IG# 0.02 0 - 0.04 x10(3)/mcL    IG% 0.3 %    NRBC% 0.0 %   Transesophageal echo (LAXMI)    Collection Time: 01/18/23 10:18 AM   Result Value Ref Range    BSA 1.55 m2    Mr max lanre 5.82 m/s   VANCOMYCIN, TROUGH    Collection Time: 01/19/23  4:32 AM   Result Value Ref Range    Vancomycin Trough 8.7 (L) 15.0 - 20.0 ug/ml   Basic Metabolic Panel    Collection Time: 01/19/23  4:32 AM   Result Value Ref Range    Sodium Level 138 136 - 145 mmol/L    Potassium Level 3.9 3.5 - 5.1 mmol/L    Chloride 103 98 - 107 mmol/L    Carbon Dioxide 26 22 - 29 mmol/L    Glucose Level 92 74 - 100 mg/dL    Blood Urea Nitrogen 9.1 8.4 - 25.7 mg/dL    Creatinine 0.89 0.73 - 1.18 mg/dL    BUN/Creatinine Ratio 10     Calcium Level Total 9.0 8.4 - 10.2 mg/dL    Anion Gap 9.0 mEq/L    eGFR >60 mls/min/1.73/m2   Magnesium    Collection Time: 01/19/23  4:32 AM   Result Value Ref Range    Magnesium Level 1.90 1.60 - 2.60 mg/dL   CBC with Differential    Collection Time: 01/19/23  4:32 AM   Result Value Ref Range    WBC 7.5 4.5 - 11.5 x10(3)/mcL    RBC 3.99 (L) 4.70 - 6.10 x10(6)/mcL    Hgb 10.8 (L) 14.0 - 18.0 gm/dL    Hct  33.1 (L) 42.0 - 52.0 %    MCV 83.0 80.0 - 94.0 fL    MCH 27.1 pg    MCHC 32.6 (L) 33.0 - 36.0 mg/dL    RDW 13.6 11.5 - 17.0 %    Platelet 238 130 - 400 x10(3)/mcL    MPV 10.6 (H) 7.4 - 10.4 fL    Neut % 69.0 %    Lymph % 19.8 %    Mono % 8.3 %    Eos % 2.1 %    Basophil % 0.5 %    Lymph # 1.49 0.6 - 4.6 x10(3)/mcL    Neut # 5.18 2.1 - 9.2 x10(3)/mcL    Mono # 0.62 0.1 - 1.3 x10(3)/mcL    Eos # 0.16 0 - 0.9 x10(3)/mcL    Baso # 0.04 0 - 0.2 x10(3)/mcL    IG# 0.02 0 - 0.04 x10(3)/mcL    IG% 0.3 %    NRBC% 0.0 %   POCT glucose    Collection Time: 01/19/23  4:11 PM   Result Value Ref Range    POCT Glucose 85 70 - 110 mg/dL   POCT glucose    Collection Time: 01/19/23  8:08 PM   Result Value Ref Range    POCT Glucose 112 (H) 70 - 110 mg/dL     Telemetry: Sinus Bradycardia    Physical Exam  Vitals reviewed.   Constitutional:       General: He is not in acute distress.     Appearance: Normal appearance. He is not ill-appearing.   HENT:      Head: Normocephalic.      Mouth/Throat:      Mouth: Mucous membranes are moist.      Pharynx: Oropharynx is clear.   Cardiovascular:      Rate and Rhythm: Normal rate and regular rhythm.      Heart sounds: Murmur heard.   Pulmonary:      Effort: Pulmonary effort is normal. No respiratory distress.      Breath sounds: Normal breath sounds.      Comments: On Room Air  Abdominal:      General: There is no distension.      Palpations: Abdomen is soft.      Tenderness: There is no abdominal tenderness.   Musculoskeletal:         General: Normal range of motion.      Cervical back: Neck supple.      Right lower leg: No edema.      Left lower leg: No edema.   Skin:     General: Skin is warm and dry.      Comments: Right groin site soft. No hematoma/ecchymosis noted   Neurological:      General: No focal deficit present.      Mental Status: He is alert and oriented to person, place, and time. Mental status is at baseline.   Psychiatric:         Mood and Affect: Mood normal.         Behavior:  Behavior normal.       Current Inpatient Medications:    Current Facility-Administered Medications:     acetaminophen tablet 1,000 mg, 1,000 mg, Oral, Q6H PRN, Ovidio Trivedi MD    acetaminophen tablet 650 mg, 650 mg, Oral, Q4H PRN, Ovidio Trivedi MD    aluminum-magnesium hydroxide-simethicone 200-200-20 mg/5 mL suspension 30 mL, 30 mL, Oral, QID PRN, Ovidio Trivedi MD    cefTRIAXone 2 gram/50 mL IVPB 2 g, 2 g, Intravenous, Q12H, Cash Martinez MD, Stopped at 01/20/23 0442    diphenhydrAMINE injection, , , PRN, Speedy Rendon Jr., MD, 25 mg at 01/19/23 1335    fentaNYL injection, , , PRN, Speedy Rendon Jr., MD, 25 mcg at 01/19/23 1339    hydrALAZINE injection 10 mg, 10 mg, Intravenous, Q4H PRN, Speedy Rendon Jr., MD    HYDROcodone-acetaminophen 5-325 mg per tablet 1 tablet, 1 tablet, Oral, Q6H PRN, Ovidio Trivedi MD    iopamidoL (ISOVUE-300) injection, , , PRN, Speedy Rendon Jr., MD, 25 mL at 01/19/23 1341    LIDOcaine (PF) 10 mg/ml (1%) injection, , , PRN, Speedy Rendon Jr., MD, 10 mL at 01/19/23 1326    melatonin tablet 6 mg, 6 mg, Oral, Nightly PRN, Ovidio Trivedi MD    methocarbamoL tablet 500 mg, 500 mg, Oral, QID PRN, Ovidio Trivedi MD, 500 mg at 01/17/23 0035    midazolam (VERSED) 1 mg/mL injection, , , PRN, Speedy Rendon Jr., MD, 0.5 mg at 01/19/23 1339    morphine injection 2 mg, 2 mg, Intravenous, Q4H PRN, Ovidio Trivedi MD    ondansetron injection 4 mg, 4 mg, Intravenous, Q4H PRN, Ovidio Trivedi MD, 4 mg at 01/17/23 0035    polyethylene glycol packet 17 g, 17 g, Oral, BID PRN, Ovidio Trivedi MD    prochlorperazine injection Soln 5 mg, 5 mg, Intravenous, Q6H PRN, Ovidio Trivedi MD    senna-docusate 8.6-50 mg per tablet 2 tablet, 2 tablet, Oral, BID PRN, Ovidio Trivedi MD    simethicone chewable tablet 80 mg, 1 tablet, Oral, QID PRN, Ovidio Trivedi MD    sodium chloride 0.9% flush 10 mL, 10 mL, Intravenous, PRN, Ovidio Trivedi MD    VTE Risk Mitigation (From admission, onward)           Ordered     IP VTE LOW  RISK PATIENT  Once         01/16/23 2344     Place sequential compression device  Until discontinued         01/16/23 2344                  Assessment:   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   Start amlodipine 5 mg bid for HTN    [facility] signing off. Reconsult if needed.         JOSE Jason  Cardiology  [facility] - 8th Floor Med Surg  01/20/2023

## 2023-01-21 PROBLEM — I33.0 INFECTIVE ENDOCARDITIS OF MITRAL VALVE: Status: ACTIVE | Noted: 2023-01-21

## 2023-01-21 PROCEDURE — 25000003 PHARM REV CODE 250: Performed by: NURSE PRACTITIONER

## 2023-01-21 PROCEDURE — 63600175 PHARM REV CODE 636 W HCPCS: Performed by: INTERNAL MEDICINE

## 2023-01-21 PROCEDURE — 99024 PR POST-OP FOLLOW-UP VISIT: ICD-10-PCS | Mod: ,,, | Performed by: PHYSICIAN ASSISTANT

## 2023-01-21 PROCEDURE — 94761 N-INVAS EAR/PLS OXIMETRY MLT: CPT

## 2023-01-21 PROCEDURE — 99024 POSTOP FOLLOW-UP VISIT: CPT | Mod: ,,, | Performed by: PHYSICIAN ASSISTANT

## 2023-01-21 PROCEDURE — 21400001 HC TELEMETRY ROOM

## 2023-01-21 RX ADMIN — CEFTRIAXONE 2 G: 2 INJECTION, SOLUTION INTRAVENOUS at 04:01

## 2023-01-21 RX ADMIN — AMLODIPINE BESYLATE 5 MG: 5 TABLET ORAL at 08:01

## 2023-01-21 RX ADMIN — CEFTRIAXONE 2 G: 2 INJECTION, SOLUTION INTRAVENOUS at 03:01

## 2023-01-21 NOTE — PROGRESS NOTES
"Inpatient Nutrition Evaluation    Admit Date: 1/16/2023   Total duration of encounter: 5 days    Nutrition Recommendation/Prescription     Continue regular diet as tolerated  RD to monitor po intake and weight changes    Nutrition Assessment     Chart Review    Reason Seen: length of stay    Malnutrition Screening Tool Results   Have you recently lost weight without trying?: No  Have you been eating poorly because of a decreased appetite?: No   MST Score: 0     Diagnosis:  Sepsis + Streptococcus sanguinis   T spine diskitis/Osteomyelitis   MV Infective endocarditis  Severe MR   Anemia, NC/NC    Relevant Medical History: n/a    Nutrition-Related Medications: Zofran PRN,  Miralax PRN, Senokot PRN    Nutrition-Related Labs: 1/20: POCT glu- 134      Diet Order: Diet Adult Regular  Oral Supplement Order: none  Appetite/Oral Intake: good/% of meals  Factors Affecting Nutritional Intake: none identified  Food/Shinto/Cultural Preferences: none reported  Food Allergies: none reported       Wound(s):       Comments    1/21: pt reports good appetite, eating food that family bring. No n/v/d/c. UBW reported 150 lb, with last known weight of 170 lb, with no recent weight loss. Weight of 57.6 kg (127 lb) on 1/17 and no previous weights available. Weight not taken at time of rounds, patient sitting in chair. Unable to determine weight loss at this time. Will continue to monitor. Will follow up with patient early.    Anthropometrics    Height: 4' 11" (149.9 cm) Height Method: Stated  Last Weight: 57.6 kg (127 lb) (01/17/23 0114) Weight Method: Stated  BMI (Calculated): 25.6  BMI Classification: overweight (BMI 25-29.9)        Ideal Body Weight (IBW), Male: 100 lb     % Ideal Body Weight, Male (lb): 127 %                          Usual Weight Provided By: patient    Wt Readings from Last 3 Encounters:   01/17/23 0114 57.6 kg (127 lb)   01/16/23 2300 57.6 kg (127 lb)   01/16/23 1029 57.9 kg (127 lb 10.3 oz)      Weight " Change(s) Since Admission:  Admit Weight: 57.6 kg (127 lb) (01/16/23 2300)      Patient Education    Not applicable.    Monitoring & Evaluation     Dietitian will monitor food and beverage intake and weight change.  Nutrition Risk/Follow-Up: low (follow-up in 5-7 days)  Patients assigned 'low nutrition risk' status do not qualify for a full nutritional assessment but will be monitored and re-evaluated in a 5-7 day time period. Please consult if re-evaluation needed sooner.    Estephanie Kurtz, Registration Eligible, Provisional LDN

## 2023-01-21 NOTE — PROGRESS NOTES
CT SURGERY PROGRESS NOTE  Flako Power  52 y.o.  1970    Patients Procedure: Procedure(s) (LRB):  Left heart cath (N/A)    Subjective  Interval History: unchanged    ROS    Medication List  Infusions    Scheduled   amLODIPine  5 mg Oral Daily    cefTRIAXone (ROCEPHIN) IVPB  2 g Intravenous Q12H       Objective:  Recent Vitals:  Temp:  [98 °F (36.7 °C)-98.7 °F (37.1 °C)] 98.5 °F (36.9 °C)  Pulse:  [68-90] 76  Resp:  [18-19] 18  SpO2:  [99 %-100 %] 99 %  BP: (123-141)/(81-93) 137/86    Physical Exam     I/O last 24 hrs:  Intake/Output - Last 3 Shifts         01/19 0700  01/20 0659 01/20 0700  01/21 0659    P.O. 600 740    I.V. (mL/kg) 900 (15.6)     Total Intake(mL/kg) 1500 (26) 740 (12.8)    Urine (mL/kg/hr) 1225 (0.9) 1500 (1.1)    Stool 0 0    Total Output 1225 1500    Net +275 -760          Stool Occurrence 2 x 1 x            Labs  BMP: No results for input(s): GLU, NA, K, CL, CO2, BUN, CREATININE, CALCIUM, MG in the last 48 hours.  CBC: No results for input(s): WBC, RBC, HGB, HCT, PLT, MCV, MCH, MCHC in the last 48 hours.  CMP: No results for input(s): GLU, CALCIUM, ALBUMIN, PROT, NA, K, CO2, CL, BUN, CREATININE, ALKPHOS, ALT, AST, BILITOT in the last 48 hours.      Imaging:   CXR: No results found in the last 24 hours.  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

## 2023-01-21 NOTE — CONSULTS
Infectious Diseases Consultation       Inpatient consult to Infectious Diseases  Consult performed by: Cristal Bolaños MD  Consult ordered by: Cash Martinez MD      HPI:   52-year-old male with no significant past medical history is admitted to [facility] on 01/16/2023, initially presenting to [facility] ED with complaints of back pain .  He  apparently started with left knee in mid December 2022  seen at urgent care with x-rays unremarkable.  He was seen at [facility] ED on 12/20 and was diagnosed with lumbosacral strain.  He did have progressive worsening back pain which led to presentation back to [facility] on 01/16 with report of fevers of up to 102.7 and leukocytosis of 12.3, ESR 63, CRP 89, COVID 19 and influenza negative.  He was also noted to have abnormal renal function with creatinine of 1.19, ESR 63 CRP 89.8.  Two sets of blood cultures from admission positive for Streptococcus sanguinis with a follow-up blood culture from today pending.  Chest x-ray showed no acute findings.  CTA chest showed no PE but with irregularity of the T8 inferior endplate which may relate to degenerative changes but can not exclude diskitis/osteomyelitis.  CT lumbar spine showed degenerative changes and spondylolisthesis at the level of L5-S1, anterolisthesis of L5 on S1.  MRI thoracic spine showed findings concerning for diskitis and osteomyelitis at T8-T9, no epidural or paraspinal fluid collection with no significant spinal canal or neural foraminal stenosis.  2D echocardiogram showed anterior mitral leaflet vegetation with severe mitral regurgitation.  A follow-up LAXMI showed large mitral valve vegetation associated with valve perforation and severe MR.  He has been seen by the Cardiology and cardia thoracic surgery team with inputs noted. He has been seen by the neurosurgery team as well with no plan for surgical intervention.    Past Medical and Surgical History  Allergies :   Patient has no known  allergies.    Medical :   He has no past medical history on file.    Surgical :   He has a past surgical history that includes Left heart catheterization (N/A, 1/19/2023).     Family History  Reviewed and noncontributory    Social History  He  denies alcohol, tobacco or illicit drug abuse    Review of Systems   Constitutional:  Positive for malaise/fatigue.   HENT: Negative.     Respiratory: Negative.     Cardiovascular: Negative.    Gastrointestinal: Negative.    Genitourinary: Negative.    Musculoskeletal:  Positive for back pain.   Neurological:  Positive for weakness.   Endo/Heme/Allergies: Negative.    Psychiatric/Behavioral: Negative.     All other Systems review done and negative.    Objective   Physical Exam  Vitals reviewed.   Constitutional:       General: He is not in acute distress.     Appearance: He is not toxic-appearing.      Comments: Sitting up in bed.  Daughter at the bedside   HENT:      Head: Normocephalic and atraumatic.   Eyes:      Pupils: Pupils are equal, round, and reactive to light.   Cardiovascular:      Rate and Rhythm: Normal rate and regular rhythm.      Heart sounds: Murmur heard.   Pulmonary:      Effort: Pulmonary effort is normal. No respiratory distress.      Breath sounds: Normal breath sounds.   Abdominal:      General: Bowel sounds are normal. There is no distension.      Palpations: Abdomen is soft.      Tenderness: There is no abdominal tenderness.   Genitourinary:     Comments: No suprapubic tenderness  Musculoskeletal:         General: No deformity.      Cervical back: Neck supple.      Right lower leg: No edema.      Left lower leg: No edema.   Skin:     Findings: No erythema or rash.   Neurological:      Mental Status: He is alert and oriented to person, place, and time.   Psychiatric:      Comments: Calm and cooperative     VITAL SIGNS: 24 HR MIN & MAX LAST    Temp  Min: 98 °F (36.7 °C)  Max: 98.7 °F (37.1 °C)  98.7 °F (37.1 °C)        BP  Min: 123/81  Max: 159/87   "123/81     Pulse  Min: 68  Max: 90  77     Resp  Min: 19  Max: 19  19    SpO2  Min: 99 %  Max: 100 %  99 %      HT: 4' 11" (149.9 cm)  WT: 57.6 kg (127 lb)  BMI: 25.6     Recent Results (from the past 24 hour(s))   POCT glucose    Collection Time: 01/20/23 11:13 AM   Result Value Ref Range    POCT Glucose 134 (H) 70 - 110 mg/dL   POCT glucose    Collection Time: 01/20/23  3:13 PM   Result Value Ref Range    POCT Glucose 99 70 - 110 mg/dL   VANCOMYCIN, TROUGH    Collection Time: 01/20/23  3:59 PM   Result Value Ref Range    Vancomycin Trough 1.8 (L) 15.0 - 20.0 ug/ml       Imaging  MRI Thoracic Spine W WO Cont [[id number]] Resulted: 01/17/23 1221   Order Status: Completed Updated: 01/17/23 1224   Narrative:     EXAMINATION:   MRI THORACIC SPINE W WO CONTRAST     CLINICAL HISTORY:   Osteomyelitis, thoracic;     TECHNIQUE:   Multiplanar multisequence MR images of the thoracic spine are obtained with and without contrast.     COMPARISON:   CT chest dated 01/16/2023     FINDINGS:   Thoracic alignment is preserved.  There is edema and enhancement along the inferior endplate of T8, with associated disc edema, concerning for discitis/osteomyelitis.  The bone marrow is otherwise normal in signal.  The vertebral body heights are otherwise preserved.     The spinal cord is normal in signal.  There is no abnormal intrathecal or epidural enhancement.  There is no epidural fluid collection.     There are multilevel degenerative changes with small disc bulges and facet hypertrophy.  The spinal canal and neural foramina are patent.     There is very mild prevertebral soft tissue enhancement at T8-T9.  There is no drainable fluid collection identified.    Impression:       1. Findings concerning for discitis/osteomyelitis at T8-T9.  New epidural or paraspinal fluid collection.   2. No significant spinal canal or neural foraminal stenosis.             Impression  1. Streptococcus sanguinis sepsis  2.  Mitral valve endocarditis  3. " T-spine osteomyelitis/diskitis  4. Acute kidney injury  5. Elevated ESR CRP  6.  Anemia     Recommendations  I agree with the management of this patient.  He presented with progressively worsening back pain, sepsis syndrome with Streptococcus sanguinis isolated from the blood, findings of T-spine osteomyelitis/diskitis mitral valve endocarditis.  We will follow pending repeat blood culture to document clearance of bacteremia, we will discontinue vancomycin and continue antibiotic coverage with ceftriaxone.  He will need about 6-8 weeks of antibiotics, following inflammatory markers.  He has been seen by Neurosurgery team with no intervention planned.  Cardiology and Cardiothoracic surgery on board and is status post [facility] with timing for MVR being deferred to Cardiothoracic surgery team.  He reports not seen by a dentist since the 90s and has been educated and counseled on the importance, encouraged to see one.   Case has been discussed at length with patient, questions solicited and answered.  I have also discussed the case with nursing staff.  I would like to thank the team very much for the opportunity to assist in the care of this patient.

## 2023-01-21 NOTE — PROGRESS NOTES
Please refer to previous progress note from Me, addended by Dr. Davis 1/17/23. No neurosurgical indications for T spine findings

## 2023-01-21 NOTE — PROGRESS NOTES
[facility]  Hospital Medicine Progress Note        Chief Complaint: back pain    HPI:    52-year-old male  with no significant past medical history  presented to [facility] ED  on 01/16/2023 with complaint of back pain. His symptoms initially started in mid December 2022 with left knee pain and was seen at an urgent care and had left knee x-rays that were unremarkable and was prescribed NSAID, subsequently started having back pain and visited [facility] ED on 12/20/2022 and was diagnosed with lumbosacral strain and prescribed NSAID, tramadol and baclofen.  His mid lower back pain continued to get worse and over the past week started having nonproductive cough upon waking from sleep in the morning and had noticed that whenever he coughs his back pain worsens and radiates bilaterally to his abdomen.  Denies loss of bowel or bladder control or lower extremity weakness. Denies shortness breath, chest pain, fever or chills.    he arrived to [facility] ED today on 1/6/2023 where he found to be febrile 102.7, tachycardic and hypertensive.  His labs notable for WBC 12.3, ESR 63, CRP 89.  COVID 19 and flu negative. CTA chest show no pulmonary embolus or parenchymal lung disease, irregularity of T8 inferior endplate may relate to degenerative change but cannot exclude discitis/osteomyelitis.  CT lumbar spine showed degenerative changes and spondylolisthesis at L5-S1. He was given IV fluid, ceftriaxone and vancomycin and  transferred to [facility] on 1/16/23 and referred to hospital medicine service for further evaluation and management.    Interval Hx:   No overnight events; no new complaints.     Objective/physical exam:  General: Appears comfortable, no acute distress.  Integumentary: Warm, dry, intact.    Musculoskeletal: Purposeful movement noted.   Respiratory: No accessory muscle use. Breath sounds are equal.  Cardiovascular: Regular rate. No peripheral edema.    VITAL SIGNS: 24 HRS MIN & MAX LAST   Temp  Min: 98 °F  (36.7 °C)  Max: 98.7 °F (37.1 °C) 98.5 °F (36.9 °C)   BP  Min: 123/81  Max: 158/84 137/79   Pulse  Min: 70  Max: 90  70   Resp  Min: 18  Max: 19 18   SpO2  Min: 98 %  Max: 100 % 98 %     Cardiac catheterization    There was no obstructive coronary artery disease.    The procedure log was documented by No documenter listed and verified by   Speedy Rendon Jr, MD.    Date: 1/19/2023  Time: 5:19 PM    Procedure:   Left heart catheterization     Preoperative diagnosis:  Infective endocarditis    Postoperative diagnosis:  Infective endocarditis     Access:    Right common femoral artery    Estimated blood loss: 10 cc  Complications: None     Summary:  Consent obtained. Risks and benefits discussed with the patient. The   patient was brought to the cath lab in a fasting state. The patient was   prepped and draped in usual sterile fashion. A preprocedure time-out was   performed.     Ultrasound-guided right common femoral arterial access via modified   Seldinger technique using a micropuncture kit. A 6 [place] sheath was   inserted into the right common femoral artery.     A 5 [place] JL3.5 catheter was used to cannulate the left main coronary   artery; angiography was performed, and multiple fluoroscopic views were   obtained.  A 5 [place] JR4 catheter was used to cannulate the right coronary artery;   angiography was performed, and multiple fluoroscopic views were obtained.    At the end the procedure, all wires and catheters were removed.   Hemostasis achieved with manual compression.     Findings:  There was no obstructive coronary artery disease.     Assessment/Plan:  - No obstructive coronary disease. Mgmt of infective endocarditis as per   primary team.    Speedy Rendon MD  Interventional Cardiology/Structural Heart Disease  Card[facility]ovascular [facility] Labs   Lab 01/17/23  0405 01/18/23  0356 01/19/23  0432   WBC 7.9 7.1 7.5   RBC 4.18* 3.98* 3.99*   HGB 11.4* 10.7* 10.8*   HCT 33.9* 32.7*  33.1*   MCV 81.1 82.2 83.0   MCH 27.3 26.9 27.1   MCHC 33.6 32.7* 32.6*   RDW 13.9 13.6 13.6    229 238   MPV 11.4* 10.4 10.6*       Recent Labs   Lab 01/16/23  1315 01/17/23  0405 01/18/23  0356 01/19/23  0432    137 137 138   K 4.2 3.9 4.2 3.9   CO2 26 24 24 26   BUN 11.8 12.5 9.7 9.1   CREATININE 1.19* 0.88 0.91 0.89   CALCIUM 9.6 9.1 8.6 9.0   MG  --  2.00  --  1.90   ALBUMIN 3.1*  --   --   --    ALKPHOS 85  --   --   --    ALT 31  --   --   --    AST 25  --   --   --    BILITOT 0.6  --   --   --           Microbiology Results (last 7 days)       Procedure Component Value Units Date/Time    Blood Culture [[id number]] Collected: 01/20/23 1559    Order Status: Resulted Specimen: Blood Updated: 01/20/23 1627    Blood Culture [[id number]] Collected: 01/20/23 1558    Order Status: Resulted Specimen: Blood Updated: 01/20/23 1627    Chlamydia/GC, PCR [[id number]]  (Normal) Collected: 01/17/23 1002    Order Status: Completed Specimen: Urine Updated: 01/17/23 1207     Chlamydia trachomatis PCR Not Detected     N. gonorrhea PCR Not Detected    Narrative:      The Xpert CT/NG test, performed on the GeneXpert system is a qualitative in vitro real-time polymerase chain reaction (PCR) test for the automated detected and differentiation for genomic DNA from Chlamydia trachomatis (CT) and/or Neisseria gonorrhoeae (NG).             See below for Radiology    Scheduled Med:   amLODIPine  5 mg Oral Daily    cefTRIAXone (ROCEPHIN) IVPB  2 g Intravenous Q12H          PRN Meds:  acetaminophen, acetaminophen, aluminum-magnesium hydroxide-simethicone, diphenhydrAMINE, fentaNYL, hydrALAZINE, HYDROcodone-acetaminophen, iopamidoL, LIDOcaine (PF) 10 mg/ml (1%), melatonin, methocarbamoL, midazolam, morphine, ondansetron, polyethylene glycol, prochlorperazine, senna-docusate 8.6-50 mg, simethicone, sodium chloride 0.9%     Nutrition Status:  Cardiac Diet.     Assessment/Plan:  T spine diskitis/Osteomyelitis   MV Infective  endocarditis  Severe MR   Anemia-unspecified.    ________________    Back pain concerning for thoracic spine diskitis--etiology unknown---no plans for surgical intervention    HIV /syphilis negative  Bacteremia---repeat blood cultures negative thus far.  Follow-up on inflammatory markers in the a.m.     Large mitral valve vegetation with valvular perforation and severe mitral regurgitation, EF preserved.--consult placed for ID physician  CT surgeon's recommendations pending--plan for valve replacement?       Anticipated discharge and Disposition:  Pending.     All diagnosis and differential diagnosis have been reviewed,  interpreted and communicated appropriately to care team. assessment and plan has been documented; I have personally reviewed the labs and test results that are presently available and pertinent to this hospital course; I have reviewed medical records based upon their availability.    All of the patient's questions have been  addressed and answered. Patient's is agreeable to the above stated plan.   I will continue to monitor closely and make adjustments to medical management as needed.      Alexia Alvarez,    01/21/2023        This note was created with the assistance of Dragon voice recognition software. There may be transcription errors as a result of using this technology however minimal. Effort has been made to assure accuracy of transcription but any obvious errors or omissions should be clarified with the author of the document.

## 2023-01-22 LAB
ANION GAP SERPL CALC-SCNC: 9 MEQ/L
BASOPHILS # BLD AUTO: 0.04 X10(3)/MCL (ref 0–0.2)
BASOPHILS NFR BLD AUTO: 0.4 %
BUN SERPL-MCNC: 8 MG/DL (ref 8.4–25.7)
CALCIUM SERPL-MCNC: 9.3 MG/DL (ref 8.4–10.2)
CHLORIDE SERPL-SCNC: 105 MMOL/L (ref 98–107)
CO2 SERPL-SCNC: 25 MMOL/L (ref 22–29)
CREAT SERPL-MCNC: 0.85 MG/DL (ref 0.73–1.18)
CREAT/UREA NIT SERPL: 9
EOSINOPHIL # BLD AUTO: 0.23 X10(3)/MCL (ref 0–0.9)
EOSINOPHIL NFR BLD AUTO: 2.4 %
ERYTHROCYTE [DISTWIDTH] IN BLOOD BY AUTOMATED COUNT: 14.2 % (ref 11.5–17)
GFR SERPLBLD CREATININE-BSD FMLA CKD-EPI: >60 MLS/MIN/1.73/M2
GLUCOSE SERPL-MCNC: 88 MG/DL (ref 74–100)
HCT VFR BLD AUTO: 32.4 % (ref 42–52)
HGB BLD-MCNC: 10.6 GM/DL (ref 14–18)
IMM GRANULOCYTES # BLD AUTO: 0.06 X10(3)/MCL (ref 0–0.04)
IMM GRANULOCYTES NFR BLD AUTO: 0.6 %
LYMPHOCYTES # BLD AUTO: 1.5 X10(3)/MCL (ref 0.6–4.6)
LYMPHOCYTES NFR BLD AUTO: 15.7 %
MAGNESIUM SERPL-MCNC: 1.9 MG/DL (ref 1.6–2.6)
MCH RBC QN AUTO: 26.8 PG
MCHC RBC AUTO-ENTMCNC: 32.7 MG/DL (ref 33–36)
MCV RBC AUTO: 81.8 FL (ref 80–94)
MONOCYTES # BLD AUTO: 0.59 X10(3)/MCL (ref 0.1–1.3)
MONOCYTES NFR BLD AUTO: 6.2 %
NEUTROPHILS # BLD AUTO: 7.11 X10(3)/MCL (ref 2.1–9.2)
NEUTROPHILS NFR BLD AUTO: 74.7 %
NRBC BLD AUTO-RTO: 0 %
PHOSPHATE SERPL-MCNC: 3.4 MG/DL (ref 2.3–4.7)
PLATELET # BLD AUTO: 256 X10(3)/MCL (ref 130–400)
PMV BLD AUTO: 11.2 FL (ref 7.4–10.4)
POTASSIUM SERPL-SCNC: 4 MMOL/L (ref 3.5–5.1)
RBC # BLD AUTO: 3.96 X10(6)/MCL (ref 4.7–6.1)
SODIUM SERPL-SCNC: 139 MMOL/L (ref 136–145)
WBC # SPEC AUTO: 9.5 X10(3)/MCL (ref 4.5–11.5)

## 2023-01-22 PROCEDURE — 84100 ASSAY OF PHOSPHORUS: CPT | Performed by: STUDENT IN AN ORGANIZED HEALTH CARE EDUCATION/TRAINING PROGRAM

## 2023-01-22 PROCEDURE — 99024 POSTOP FOLLOW-UP VISIT: CPT | Mod: ,,, | Performed by: PHYSICIAN ASSISTANT

## 2023-01-22 PROCEDURE — 36415 COLL VENOUS BLD VENIPUNCTURE: CPT | Performed by: STUDENT IN AN ORGANIZED HEALTH CARE EDUCATION/TRAINING PROGRAM

## 2023-01-22 PROCEDURE — 80048 BASIC METABOLIC PNL TOTAL CA: CPT | Performed by: STUDENT IN AN ORGANIZED HEALTH CARE EDUCATION/TRAINING PROGRAM

## 2023-01-22 PROCEDURE — 85025 COMPLETE CBC W/AUTO DIFF WBC: CPT | Performed by: STUDENT IN AN ORGANIZED HEALTH CARE EDUCATION/TRAINING PROGRAM

## 2023-01-22 PROCEDURE — 25000003 PHARM REV CODE 250: Performed by: NURSE PRACTITIONER

## 2023-01-22 PROCEDURE — 63600175 PHARM REV CODE 636 W HCPCS: Performed by: INTERNAL MEDICINE

## 2023-01-22 PROCEDURE — 21400001 HC TELEMETRY ROOM

## 2023-01-22 PROCEDURE — 25000003 PHARM REV CODE 250: Performed by: STUDENT IN AN ORGANIZED HEALTH CARE EDUCATION/TRAINING PROGRAM

## 2023-01-22 PROCEDURE — 83735 ASSAY OF MAGNESIUM: CPT | Performed by: STUDENT IN AN ORGANIZED HEALTH CARE EDUCATION/TRAINING PROGRAM

## 2023-01-22 PROCEDURE — 99024 PR POST-OP FOLLOW-UP VISIT: ICD-10-PCS | Mod: ,,, | Performed by: PHYSICIAN ASSISTANT

## 2023-01-22 RX ADMIN — CEFTRIAXONE 2 G: 2 INJECTION, SOLUTION INTRAVENOUS at 03:01

## 2023-01-22 RX ADMIN — THERA TABS 1 TABLET: TAB at 09:01

## 2023-01-22 RX ADMIN — AMLODIPINE BESYLATE 5 MG: 5 TABLET ORAL at 09:01

## 2023-01-22 NOTE — PROGRESS NOTES
CT SURGERY PROGRESS NOTE  Flako Power  52 y.o.  1970    Patients Procedure: Procedure(s) (LRB):  Left heart cath (N/A)    Subjective  Interval History: unchanged    ROS    Medication List  Infusions    Scheduled   amLODIPine  5 mg Oral Daily    cefTRIAXone (ROCEPHIN) IVPB  2 g Intravenous Q12H    multivitamin  1 tablet Oral Daily       Objective:  Recent Vitals:  Temp:  [98 °F (36.7 °C)-98.6 °F (37 °C)] 98 °F (36.7 °C)  Pulse:  [68-85] 79  Resp:  [18-20] 18  SpO2:  [98 %-100 %] 98 %  BP: (129-158)/(78-93) 148/80    Physical Exam     I/O last 24 hrs:  Intake/Output - Last 3 Shifts         01/20 0700  01/21 0659 01/21 0700 01/22 0659 01/22 0700 01/23 0659    P.O. 740 1460     I.V. (mL/kg)       Total Intake(mL/kg) 740 (12.8) 1460 (25.3)     Urine (mL/kg/hr) 1500 (1.1) 800 (0.6)     Stool 0 0     Total Output 1500 800     Net -760 +660            Urine Occurrence  4 x     Stool Occurrence 1 x 2 x             Labs  BMP:   Recent Labs   Lab 01/22/23  0603      K 4.0   CO2 25   BUN 8.0*   CREATININE 0.85   CALCIUM 9.3   MG 1.90     CBC:   Recent Labs   Lab 01/22/23  0604   WBC 9.5   RBC 3.96*   HGB 10.6*   HCT 32.4*      MCV 81.8   MCH 26.8   MCHC 32.7*     CMP:   Recent Labs   Lab 01/22/23  0603   CALCIUM 9.3      K 4.0   CO2 25   BUN 8.0*   CREATININE 0.85         Imaging:   CXR: No results found in the last 24 hours.        ASSESSMENT/PLAN:    Appreciate neuro eval completed 1/17  ID consulted   Ongoing eval for MVR in progress    Case and plan of care discussed with MD Speedy Almonte PA-C

## 2023-01-22 NOTE — PROGRESS NOTES
[facility]  Hospital Medicine Progress Note        Chief Complaint: back pain    HPI:    52-year-old male  with no significant past medical history  presented to [facility] ED  on 01/16/2023 with complaint of back pain. His symptoms initially started in mid December 2022 with left knee pain and was seen at an urgent care and had left knee x-rays that were unremarkable and was prescribed NSAID, subsequently started having back pain and visited [facility] ED on 12/20/2022 and was diagnosed with lumbosacral strain and prescribed NSAID, tramadol and baclofen.  His mid lower back pain continued to get worse and over the past week started having nonproductive cough upon waking from sleep in the morning and had noticed that whenever he coughs his back pain worsens and radiates bilaterally to his abdomen.  Denies loss of bowel or bladder control or lower extremity weakness. Denies shortness breath, chest pain, fever or chills.    he arrived to [facility] ED today on 1/6/2023 where he found to be febrile 102.7, tachycardic and hypertensive.  His labs notable for WBC 12.3, ESR 63, CRP 89.  COVID 19 and flu negative. CTA chest show no pulmonary embolus or parenchymal lung disease, irregularity of T8 inferior endplate may relate to degenerative change but cannot exclude discitis/osteomyelitis.  CT lumbar spine showed degenerative changes and spondylolisthesis at L5-S1. He was given IV fluid, ceftriaxone and vancomycin and  transferred to [facility] on 1/16/23 and referred to hospital medicine service for further evaluation and management.    Interval Hx:   No overnight events; no new complaints.     Objective/physical exam:  General: Appears comfortable, no acute distress.  Integumentary: Warm, dry, intact.    Musculoskeletal: Purposeful movement noted.   Respiratory: No accessory muscle use. Breath sounds are equal.  Cardiovascular: Regular rate. No peripheral edema.    VITAL SIGNS: 24 HRS MIN & MAX LAST   Temp  Min: 98  °F (36.7 °C)  Max: 98.4 °F (36.9 °C) 98.3 °F (36.8 °C)   BP  Min: 129/78  Max: 148/80 130/84   Pulse  Min: 68  Max: 79  72   Resp  Min: 18  Max: 20 20   SpO2  Min: 98 %  Max: 100 % 99 %     Cardiac catheterization    There was no obstructive coronary artery disease.    The procedure log was documented by No documenter listed and verified by   Speedy Rendon Jr, MD.    Date: 1/19/2023  Time: 5:19 PM    Procedure:   Left heart catheterization     Preoperative diagnosis:  Infective endocarditis    Postoperative diagnosis:  Infective endocarditis     Access:    Right common femoral artery    Estimated blood loss: 10 cc  Complications: None     Summary:  Consent obtained. Risks and benefits discussed with the patient. The   patient was brought to the cath lab in a fasting state. The patient was   prepped and draped in usual sterile fashion. A preprocedure time-out was   performed.     Ultrasound-guided right common femoral arterial access via modified   Seldinger technique using a micropuncture kit. A 6 [place] sheath was   inserted into the right common femoral artery.     A 5 [place] JL3.5 catheter was used to cannulate the left main coronary   artery; angiography was performed, and multiple fluoroscopic views were   obtained.  A 5 [place] JR4 catheter was used to cannulate the right coronary artery;   angiography was performed, and multiple fluoroscopic views were obtained.    At the end the procedure, all wires and catheters were removed.   Hemostasis achieved with manual compression.     Findings:  There was no obstructive coronary artery disease.     Assessment/Plan:  - No obstructive coronary disease. Mgmt of infective endocarditis as per   primary team.    Speedy Rendon MD  Interventional Cardiology/Structural Heart Disease  Cardiovascular [facility] Labs   Lab 01/18/23  0356 01/19/23  0432 01/22/23  0604   WBC 7.1 7.5 9.5   RBC 3.98* 3.99* 3.96*   HGB 10.7* 10.8* 10.6*   HCT 32.7* 33.1*  32.4*   MCV 82.2 83.0 81.8   MCH 26.9 27.1 26.8   MCHC 32.7* 32.6* 32.7*   RDW 13.6 13.6 14.2    238 256   MPV 10.4 10.6* 11.2*       Recent Labs   Lab 01/16/23  1315 01/17/23  0405 01/18/23  0356 01/19/23  0432 01/22/23  0603    137 137 138 139   K 4.2 3.9 4.2 3.9 4.0   CO2 26 24 24 26 25   BUN 11.8 12.5 9.7 9.1 8.0*   CREATININE 1.19* 0.88 0.91 0.89 0.85   CALCIUM 9.6 9.1 8.6 9.0 9.3   MG  --  2.00  --  1.90 1.90   ALBUMIN 3.1*  --   --   --   --    ALKPHOS 85  --   --   --   --    ALT 31  --   --   --   --    AST 25  --   --   --   --    BILITOT 0.6  --   --   --   --           Microbiology Results (last 7 days)       Procedure Component Value Units Date/Time    Blood Culture [[id number]]  (Normal) Collected: 01/20/23 1559    Order Status: Completed Specimen: Blood Updated: 01/21/23 1701     CULTURE, BLOOD ([facility]) No Growth At 24 Hours    Blood Culture [[id number]]  (Normal) Collected: 01/20/23 1558    Order Status: Completed Specimen: Blood Updated: 01/21/23 1701     CULTURE, BLOOD ([facility]) No Growth At 24 Hours    Chlamydia/GC, PCR [[id number]]  (Normal) Collected: 01/17/23 1002    Order Status: Completed Specimen: Urine Updated: 01/17/23 1207     Chlamydia trachomatis PCR Not Detected     N. gonorrhea PCR Not Detected    Narrative:      The Xpert CT/NG test, performed on the GeneXpert system is a qualitative in vitro real-time polymerase chain reaction (PCR) test for the automated detected and differentiation for genomic DNA from Chlamydia trachomatis (CT) and/or Neisseria gonorrhoeae (NG).             See below for Radiology    Scheduled Med:   amLODIPine  5 mg Oral Daily    cefTRIAXone (ROCEPHIN) IVPB  2 g Intravenous Q12H    multivitamin  1 tablet Oral Daily          PRN Meds:  acetaminophen, acetaminophen, aluminum-magnesium hydroxide-simethicone, diphenhydrAMINE, fentaNYL, hydrALAZINE, HYDROcodone-acetaminophen, iopamidoL, LIDOcaine (PF) 10 mg/ml (1%), melatonin, methocarbamoL, midazolam, morphine,  ondansetron, polyethylene glycol, prochlorperazine, senna-docusate 8.6-50 mg, simethicone, sodium chloride 0.9%     Nutrition Status:  Cardiac Diet.     Assessment/Plan:  T spine diskitis/Osteomyelitis   MV Infective endocarditis  Severe MR   Anemia-unspecified.    ________________  Large mitral valve vegetation with valvular perforation and severe mitral regurgitation, EF preserved.--consult placed for ID physician  CT surgeon's recommendations pending--plan for valve replacement--workup in progress, for now continue supportive care.   Continue cardiac monitoring.     Back pain concerning for thoracic spine diskitis--etiology unknown---no plans for surgical intervention    Bacteremia---repeat blood cultures negative thus far.  Follow-up on inflammatory markers in the a.m.      Anticipated discharge and Disposition:  Pending.     All diagnosis and differential diagnosis have been reviewed,  interpreted and communicated appropriately to care team. assessment and plan has been documented; I have personally reviewed the labs and test results that are presently available and pertinent to this hospital course; I have reviewed medical records based upon their availability.    All of the patient's questions have been  addressed and answered. Patient's is agreeable to the above stated plan.   I will continue to monitor closely and make adjustments to medical management as needed.      Alexia Alvarez,    01/22/2023        This note was created with the assistance of Dragon voice recognition software. There may be transcription errors as a result of using this technology however minimal. Effort has been made to assure accuracy of transcription but any obvious errors or omissions should be clarified with the author of the document.

## 2023-01-23 DIAGNOSIS — R78.81 BACTEREMIA: ICD-10-CM

## 2023-01-23 DIAGNOSIS — I33.0 INFECTIVE ENDOCARDITIS OF MITRAL VALVE: Primary | ICD-10-CM

## 2023-01-23 DIAGNOSIS — I34.0 SEVERE MITRAL VALVE REGURGITATION: ICD-10-CM

## 2023-01-23 LAB
CRP SERPL-MCNC: 13.9 MG/L
ERYTHROCYTE [SEDIMENTATION RATE] IN BLOOD: 79 MM/HR (ref 0–15)

## 2023-01-23 PROCEDURE — 85651 RBC SED RATE NONAUTOMATED: CPT | Performed by: STUDENT IN AN ORGANIZED HEALTH CARE EDUCATION/TRAINING PROGRAM

## 2023-01-23 PROCEDURE — 86140 C-REACTIVE PROTEIN: CPT | Performed by: STUDENT IN AN ORGANIZED HEALTH CARE EDUCATION/TRAINING PROGRAM

## 2023-01-23 PROCEDURE — 63600175 PHARM REV CODE 636 W HCPCS: Performed by: INTERNAL MEDICINE

## 2023-01-23 PROCEDURE — 21400001 HC TELEMETRY ROOM

## 2023-01-23 PROCEDURE — 25000003 PHARM REV CODE 250: Performed by: STUDENT IN AN ORGANIZED HEALTH CARE EDUCATION/TRAINING PROGRAM

## 2023-01-23 PROCEDURE — 25000003 PHARM REV CODE 250: Performed by: NURSE PRACTITIONER

## 2023-01-23 PROCEDURE — 94761 N-INVAS EAR/PLS OXIMETRY MLT: CPT

## 2023-01-23 PROCEDURE — 36415 COLL VENOUS BLD VENIPUNCTURE: CPT | Performed by: STUDENT IN AN ORGANIZED HEALTH CARE EDUCATION/TRAINING PROGRAM

## 2023-01-23 RX ADMIN — CEFTRIAXONE 2 G: 2 INJECTION, SOLUTION INTRAVENOUS at 03:01

## 2023-01-23 RX ADMIN — CEFTRIAXONE 2 G: 2 INJECTION, SOLUTION INTRAVENOUS at 02:01

## 2023-01-23 RX ADMIN — AMLODIPINE BESYLATE 5 MG: 5 TABLET ORAL at 08:01

## 2023-01-23 RX ADMIN — THERA TABS 1 TABLET: TAB at 08:01

## 2023-01-23 NOTE — PROGRESS NOTES
"Flako Power is a 52 y.o. male patient.   1. Severe mitral valve regurgitation    2. Discitis    3. Chest pain    4. Endocarditis    5. Bacteremia    6. CAD (coronary artery disease)      No past medical history on file.  No past surgical history pertinent negatives on file.  Scheduled Meds:   amLODIPine  5 mg Oral Daily    cefTRIAXone (ROCEPHIN) IVPB  2 g Intravenous Q12H    multivitamin  1 tablet Oral Daily     Continuous Infusions:  PRN Meds:acetaminophen, acetaminophen, aluminum-magnesium hydroxide-simethicone, diphenhydrAMINE, fentaNYL, hydrALAZINE, HYDROcodone-acetaminophen, iopamidoL, LIDOcaine (PF) 10 mg/ml (1%), melatonin, methocarbamoL, midazolam, morphine, ondansetron, polyethylene glycol, prochlorperazine, senna-docusate 8.6-50 mg, simethicone, sodium chloride 0.9%    Review of patient's allergies indicates:  No Known Allergies  Active Hospital Problems    Diagnosis  POA    *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 [I33.0]  Yes    Severe mitral valve regurgitation [I34.0]  Yes     Patient with severe mitral regurgitation and bacteremia      Bacteremia [R78.81]  Yes     Patient with positive blood cultures and evidence of degenerative mitral valve disease        Resolved Hospital Problems   No resolved problems to display.     Blood pressure 131/78, pulse 77, temperature 98.7 °F (37.1 °C), temperature source Oral, resp. rate 18, height 4' 11" (1.499 m), weight 57.6 kg (127 lb), SpO2 98 %.    Subjective:    Resting comfortably  ID recommending 6-8 weeks of antibiotics  Repeat blood cultures pending    Objective:   AFVSS  Heart: RRR  Lungs: respirations nonlabored, clear  Abdomen: soft, ntnd  LHC no obstructive CAD (1/18)    Assesment/Plan:    MV infective endocarditis  - f/u with Dr. Rodrigues in 4 weeks             ARNOL Gonzalez  1/23/2023    "

## 2023-01-23 NOTE — PROGRESS NOTES
[facility]  Hospital Medicine Progress Note        Chief Complaint: back pain    HPI:    52-year-old male  with no significant past medical history  presented to [facility] ED  on 01/16/2023 with complaint of back pain. His symptoms initially started in mid December 2022 with left knee pain and was seen at an urgent care and had left knee x-rays that were unremarkable and was prescribed NSAID, subsequently started having back pain and visited [facility] ED on 12/20/2022 and was diagnosed with lumbosacral strain and prescribed NSAID, tramadol and baclofen.  His mid lower back pain continued to get worse and over the past week started having nonproductive cough upon waking from sleep in the morning and had noticed that whenever he coughs his back pain worsens and radiates bilaterally to his abdomen.  Denies loss of bowel or bladder control or lower extremity weakness. Denies shortness breath, chest pain, fever or chills.    he arrived to [facility] ED today on 1/6/2023 where he found to be febrile 102.7, tachycardic and hypertensive.  His labs notable for WBC 12.3, ESR 63, CRP 89.  COVID 19 and flu negative. CTA chest show no pulmonary embolus or parenchymal lung disease, irregularity of T8 inferior endplate may relate to degenerative change but cannot exclude discitis/osteomyelitis.  CT lumbar spine showed degenerative changes and spondylolisthesis at L5-S1. He was given IV fluid, ceftriaxone and vancomycin and  transferred to [facility] on 1/16/23 and referred to hospital medicine service for further evaluation and management.    Interval Hx:   No overnight events; no new complaints.     Objective/physical exam:  General: Appears comfortable, no acute distress.  Integumentary: Warm, dry, intact.    Musculoskeletal: Purposeful movement noted.   Respiratory: No accessory muscle use. Breath sounds are equal.  Cardiovascular: Regular rate. No peripheral edema.    VITAL SIGNS: 24 HRS MIN & MAX LAST   Temp  Min:  97.5 °F (36.4 °C)  Max: 98.7 °F (37.1 °C) 98.2 °F (36.8 °C)   BP  Min: 113/71  Max: 144/80 113/71   Pulse  Min: 72  Max: 88  74   Resp  Min: 16  Max: 20 18   SpO2  Min: 98 %  Max: 99 % 98 %     Cardiac catheterization    There was no obstructive coronary artery disease.    The procedure log was documented by No documenter listed and verified by   Speedy Rendon Jr, MD.    Date: 1/19/2023  Time: 5:19 PM    Procedure:   Left heart catheterization     Preoperative diagnosis:  Infective endocarditis    Postoperative diagnosis:  Infective endocarditis     Access:    Right common femoral artery    Estimated blood loss: 10 cc  Complications: None     Summary:  Consent obtained. Risks and benefits discussed with the patient. The   patient was brought to the cath lab in a fasting state. The patient was   prepped and draped in usual sterile fashion. A preprocedure time-out was   performed.     Ultrasound-guided right common femoral arterial access via modified   Seldinger technique using a micropuncture kit. A 6 [place] sheath was   inserted into the right common femoral artery.     A 5 [place] JL3.5 catheter was used to cannulate the left main coronary   artery; angiography was performed, and multiple fluoroscopic views were   obtained.  A 5 [place] JR4 catheter was used to cannulate the right coronary artery;   angiography was performed, and multiple fluoroscopic views were obtained.    At the end the procedure, all wires and catheters were removed.   Hemostasis achieved with manual compression.     Findings:  There was no obstructive coronary artery disease.     Assessment/Plan:  - No obstructive coronary disease. Mgmt of infective endocarditis as per   primary team.    Speedy Rendon MD  Interventional Cardiology/Structural Heart Disease  [facility] Labs   Lab 01/18/23  0356 01/19/23  0432 01/22/23  0604   WBC 7.1 7.5 9.5   RBC 3.98* 3.99* 3.96*   HGB 10.7* 10.8* 10.6*   HCT 32.7*  33.1* 32.4*   MCV 82.2 83.0 81.8   MCH 26.9 27.1 26.8   MCHC 32.7* 32.6* 32.7*   RDW 13.6 13.6 14.2    238 256   MPV 10.4 10.6* 11.2*       Recent Labs   Lab 01/16/23  1315 01/17/23  0405 01/18/23  0356 01/19/23  0432 01/22/23  0603    137 137 138 139   K 4.2 3.9 4.2 3.9 4.0   CO2 26 24 24 26 25   BUN 11.8 12.5 9.7 9.1 8.0*   CREATININE 1.19* 0.88 0.91 0.89 0.85   CALCIUM 9.6 9.1 8.6 9.0 9.3   MG  --  2.00  --  1.90 1.90   ALBUMIN 3.1*  --   --   --   --    ALKPHOS 85  --   --   --   --    ALT 31  --   --   --   --    AST 25  --   --   --   --    BILITOT 0.6  --   --   --   --           Microbiology Results (last 7 days)       Procedure Component Value Units Date/Time    Blood Culture [[id number]]  (Normal) Collected: 01/20/23 1558    Order Status: Completed Specimen: Blood Updated: 01/22/23 1701     CULTURE, BLOOD ([facility]) No Growth At 48 Hours    Blood Culture [[id number]]  (Normal) Collected: 01/20/23 1559    Order Status: Completed Specimen: Blood Updated: 01/22/23 1701     CULTURE, BLOOD ([facility]) No Growth At 48 Hours    Chlamydia/GC, PCR [[id number]]  (Normal) Collected: 01/17/23 1002    Order Status: Completed Specimen: Urine Updated: 01/17/23 1207     Chlamydia trachomatis PCR Not Detected     N. gonorrhea PCR Not Detected    Narrative:      The Xpert CT/NG test, performed on the GeneXpert system is a qualitative in vitro real-time polymerase chain reaction (PCR) test for the automated detected and differentiation for genomic DNA from Chlamydia trachomatis (CT) and/or Neisseria gonorrhoeae (NG).             See below for Radiology    Scheduled Med:   amLODIPine  5 mg Oral Daily    cefTRIAXone (ROCEPHIN) IVPB  2 g Intravenous Q12H    multivitamin  1 tablet Oral Daily          PRN Meds:  acetaminophen, acetaminophen, aluminum-magnesium hydroxide-simethicone, diphenhydrAMINE, fentaNYL, hydrALAZINE, HYDROcodone-acetaminophen, iopamidoL, LIDOcaine (PF) 10 mg/ml (1%), melatonin, methocarbamoL, midazolam,  morphine, ondansetron, polyethylene glycol, prochlorperazine, senna-docusate 8.6-50 mg, simethicone, sodium chloride 0.9%     Nutrition Status:  Cardiac Diet.     Assessment/Plan:  T spine diskitis/Osteomyelitis   MV Infective endocarditis  Severe MR   Anemia-unspecified.    ________________  Large mitral valve vegetation with valvular perforation and severe mitral regurgitation, EF preserved.--consult placed for ID physician  CT surgeon's recommendations pending--plan for valve replacement--workup in progress, for now continue supportive care.   Continue cardiac monitoring.     Back pain concerning for thoracic spine diskitis--etiology unknown---no plans for surgical intervention    Bacteremia---repeat blood cultures negative thus far.  Follow-up on inflammatory markers in the a.m.      Anticipated discharge and Disposition:  Pending.     All diagnosis and differential diagnosis have been reviewed,  interpreted and communicated appropriately to care team. assessment and plan has been documented; I have personally reviewed the labs and test results that are presently available and pertinent to this hospital course; I have reviewed medical records based upon their availability.    All of the patient's questions have been  addressed and answered. Patient's is agreeable to the above stated plan.   I will continue to monitor closely and make adjustments to medical management as needed.      Alexia Alvarez,    01/23/2023        This note was created with the assistance of Dragon voice recognition software. There may be transcription errors as a result of using this technology however minimal. Effort has been made to assure accuracy of transcription but any obvious errors or omissions should be clarified with the author of the document.

## 2023-01-23 NOTE — PLAN OF CARE
Discharge planning discussed with patient. He will need home IV infusion. Referrals [facility] to [facility] via [facility].

## 2023-01-24 PROCEDURE — 25000003 PHARM REV CODE 250: Performed by: INTERNAL MEDICINE

## 2023-01-24 PROCEDURE — 94761 N-INVAS EAR/PLS OXIMETRY MLT: CPT

## 2023-01-24 PROCEDURE — 25000003 PHARM REV CODE 250: Performed by: NURSE PRACTITIONER

## 2023-01-24 PROCEDURE — C1751 CATH, INF, PER/CENT/MIDLINE: HCPCS

## 2023-01-24 PROCEDURE — 25000003 PHARM REV CODE 250: Performed by: STUDENT IN AN ORGANIZED HEALTH CARE EDUCATION/TRAINING PROGRAM

## 2023-01-24 PROCEDURE — A4216 STERILE WATER/SALINE, 10 ML: HCPCS | Performed by: STUDENT IN AN ORGANIZED HEALTH CARE EDUCATION/TRAINING PROGRAM

## 2023-01-24 PROCEDURE — 36569 INSJ PICC 5 YR+ W/O IMAGING: CPT

## 2023-01-24 PROCEDURE — 21400001 HC TELEMETRY ROOM

## 2023-01-24 PROCEDURE — 63600175 PHARM REV CODE 636 W HCPCS: Performed by: INTERNAL MEDICINE

## 2023-01-24 RX ORDER — SODIUM CHLORIDE 0.9 % (FLUSH) 0.9 %
10 SYRINGE (ML) INJECTION EVERY 6 HOURS
Status: DISCONTINUED | OUTPATIENT
Start: 2023-01-24 | End: 2023-01-25 | Stop reason: HOSPADM

## 2023-01-24 RX ORDER — MUPIROCIN 20 MG/G
OINTMENT TOPICAL 2 TIMES DAILY
Status: DISCONTINUED | OUTPATIENT
Start: 2023-01-24 | End: 2023-01-25 | Stop reason: HOSPADM

## 2023-01-24 RX ORDER — SODIUM CHLORIDE 0.9 % (FLUSH) 0.9 %
10 SYRINGE (ML) INJECTION
Status: DISCONTINUED | OUTPATIENT
Start: 2023-01-24 | End: 2023-01-25 | Stop reason: HOSPADM

## 2023-01-24 RX ADMIN — THERA TABS 1 TABLET: TAB at 08:01

## 2023-01-24 RX ADMIN — Medication 10 ML: at 05:01

## 2023-01-24 RX ADMIN — MUPIROCIN: 20 OINTMENT TOPICAL at 09:01

## 2023-01-24 RX ADMIN — CEFTRIAXONE 2 G: 2 INJECTION, SOLUTION INTRAVENOUS at 04:01

## 2023-01-24 RX ADMIN — CEFTRIAXONE 2 G: 2 INJECTION, SOLUTION INTRAVENOUS at 03:01

## 2023-01-24 RX ADMIN — AMLODIPINE BESYLATE 5 MG: 5 TABLET ORAL at 08:01

## 2023-01-24 NOTE — PROCEDURES
"Flako Power is a 52 y.o. male patient.    Temp: 98.3 °F (36.8 °C) (01/24/23 1140)  Pulse: 80 (01/24/23 1140)  Resp: 18 (01/24/23 0432)  BP: 134/76 (01/24/23 1140)  SpO2: 99 % (01/24/23 1140)  Weight: 57.6 kg (127 lb) (01/17/23 0114)  Height: 4' 11" (149.9 cm) (01/17/23 0114)    PICC  Date/Time: 1/24/2023 1:57 PM  Performed by: Melissa Cabrera RN  Consent Done: Yes  Time out: Immediately prior to procedure a time out was called to verify the correct patient, procedure, equipment, support staff and site/side marked as required  Indications: vascular access  Anesthesia: local infiltration  Local anesthetic: lidocaine 1% without epinephrine  Anesthetic Total (mL): 5  Preparation: skin prepped with ChloraPrep  Skin prep agent dried: skin prep agent completely dried prior to procedure  Sterile barriers: all five maximum sterile barriers used - cap, mask, sterile gown, sterile gloves, and large sterile sheet  Hand hygiene: hand hygiene performed prior to central venous catheter insertion  Location details: right basilic  Catheter type: double lumen  Catheter size: 5 Fr  Catheter Length: 35cm    Ultrasound guidance: yes  Vessel Caliber: medium and patent, compressibility normal  Needle advanced into vessel with real time Ultrasound guidance.  Guidewire confirmed in vessel.  Sterile sheath used.  Number of attempts: 1  Post-procedure: blood return through all ports, sterile dressing applied and chlorhexidine patch    Assessment: placement verified by x-ray  Complications: none  Comments: Arm circumference 34cm. 6-8 weeks antibiotics        Name Melissa Cabrera RN  1/24/2023    "

## 2023-01-24 NOTE — PROGRESS NOTES
[facility]  Hospital Medicine Progress Note        Chief Complaint: back pain    HPI:    52-year-old male  with no significant past medical history  presented to [facility] ED  on 01/16/2023 with complaint of back pain. His symptoms initially started in mid December 2022 with left knee pain and was seen at an urgent care and had left knee x-rays that were unremarkable and was prescribed NSAID, subsequently started having back pain and visited [facility] ED on 12/20/2022 and was diagnosed with lumbosacral strain and prescribed NSAID, tramadol and baclofen.  His mid lower back pain continued to get worse and over the past week started having nonproductive cough upon waking from sleep in the morning and had noticed that whenever he coughs his back pain worsens and radiates bilaterally to his abdomen.  Denies loss of bowel or bladder control or lower extremity weakness. Denies shortness breath, chest pain, fever or chills.    he arrived to [facility] ED today on 1/6/2023 where he found to be febrile 102.7, tachycardic and hypertensive.  His labs notable for WBC 12.3, ESR 63, CRP 89.  COVID 19 and flu negative. CTA chest show no pulmonary embolus or parenchymal lung disease, irregularity of T8 inferior endplate may relate to degenerative change but cannot exclude discitis/osteomyelitis.  CT lumbar spine showed degenerative changes and spondylolisthesis at L5-S1. He was given IV fluid, ceftriaxone and vancomycin and  transferred to [facility] on 1/16/23 and referred to hospital medicine service for further evaluation and management.    Interval Hx:   No overnight events; no new complaints.     Objective/physical exam:  General: Appears comfortable, no acute distress.  Integumentary: Warm, dry, intact.    Musculoskeletal: Purposeful movement noted.   Respiratory: No accessory muscle use. Breath sounds are equal.  Cardiovascular: Regular rate. No peripheral edema.    VITAL SIGNS: 24 HRS MIN & MAX LAST   Temp  Min:  98.1 °F (36.7 °C)  Max: 99 °F (37.2 °C) 98.3 °F (36.8 °C)   BP  Min: 119/79  Max: 145/86 119/79   Pulse  Min: 70  Max: 80  72   Resp  Min: 18  Max: 20 20   SpO2  Min: 99 %  Max: 100 % 99 %     X-Ray Chest 1 View for Line/Tube Placement  Narrative: EXAMINATION:  XR CHEST 1 VIEW FOR LINE/TUBE PLACEMENT    CLINICAL HISTORY:  picc;, .    COMPARISON:  January 16, 2023    FINDINGS:  No alveolar consolidation, effusion, or pneumothorax is seen.   The thoracic aorta is normal  cardiac silhouette, central pulmonary vessels and mediastinum are normal in size and are grossly unremarkable.   visualized osseous structures are grossly unremarkable..    Interval insertion of right-sided PICC line tip projecting at the junction of the superior vena cava and right atrium  Impression: No acute chest disease is identified.    Electronically signed by: Dillon Varghese  Date:    01/24/2023  Time:    14:39    Recent Labs   Lab 01/18/23 0356 01/19/23 0432 01/22/23  0604   WBC 7.1 7.5 9.5   RBC 3.98* 3.99* 3.96*   HGB 10.7* 10.8* 10.6*   HCT 32.7* 33.1* 32.4*   MCV 82.2 83.0 81.8   MCH 26.9 27.1 26.8   MCHC 32.7* 32.6* 32.7*   RDW 13.6 13.6 14.2    238 256   MPV 10.4 10.6* 11.2*       Recent Labs   Lab 01/18/23 0356 01/19/23  0432 01/22/23  0603    138 139   K 4.2 3.9 4.0   CO2 24 26 25   BUN 9.7 9.1 8.0*   CREATININE 0.91 0.89 0.85   CALCIUM 8.6 9.0 9.3   MG  --  1.90 1.90          Microbiology Results (last 7 days)       Procedure Component Value Units Date/Time    Blood Culture [[id number]]  (Normal) Collected: 01/20/23 1554    Order Status: Completed Specimen: Blood Updated: 01/24/23 1701     CULTURE, BLOOD ([facility]) No Growth At 96 Hours    Blood Culture [[id number]]  (Normal) Collected: 01/20/23 1551    Order Status: Completed Specimen: Blood Updated: 01/24/23 1701     CULTURE, BLOOD ([facility]) No Growth At 96 Hours             See below for Radiology    Scheduled Med:   amLODIPine  5 mg Oral Daily    cefTRIAXone (ROCEPHIN)  IVPB  2 g Intravenous Q12H    multivitamin  1 tablet Oral Daily    mupirocin   Nasal BID    sodium chloride 0.9%  10 mL Intravenous Q6H          PRN Meds:  acetaminophen, acetaminophen, aluminum-magnesium hydroxide-simethicone, diphenhydrAMINE, fentaNYL, hydrALAZINE, HYDROcodone-acetaminophen, iopamidoL, LIDOcaine (PF) 10 mg/ml (1%), melatonin, methocarbamoL, midazolam, morphine, ondansetron, polyethylene glycol, prochlorperazine, senna-docusate 8.6-50 mg, simethicone, sodium chloride 0.9%, Flushing PICC Protocol **AND** sodium chloride 0.9% **AND** sodium chloride 0.9%     Nutrition Status:  Cardiac Diet.     Assessment/Plan:  T spine diskitis/Osteomyelitis   MV Infective endocarditis  Severe MR   Anemia-unspecified.    ________________  PICC line placed for patient to receive outpatient antibiotic therapy.   CT surgeon will followup with patient in 4wks.    ID physician will need to verify orders for outpatient antibiotic therapy prior to discharge.   Currently anticipate IV Rocephine for 6wks-- end date march 6, 2023--if duration is something other than what is ordered will need an update---nurse notified to inform ID that they will need to see patient etc prior to discharge planned for tomorrow.      Large mitral valve vegetation with valvular perforation and severe mitral regurgitation, EF preserved.--consult placed for ID physician  CT surgeon's recommendations pending--plan for valve replacement--workup in progress, for now continue supportive care.   Continue cardiac monitoring.     Back pain concerning for thoracic spine diskitis--etiology unknown---no plans for surgical intervention    Bacteremia---repeat blood cultures negative thus far.     Anticipated discharge and Disposition:  In the am.     All diagnosis and differential diagnosis have been reviewed,  interpreted and communicated appropriately to care team. assessment and plan has been documented; I have personally reviewed the labs and test results  that are presently available and pertinent to this hospital course; I have reviewed medical records based upon their availability.    All of the patient's questions have been  addressed and answered. Patient's is agreeable to the above stated plan.   I will continue to monitor closely and make adjustments to medical management as needed.      Alexia Alvarez, DO   01/24/2023        This note was created with the assistance of Dragon voice recognition software. There may be transcription errors as a result of using this technology however minimal. Effort has been made to assure accuracy of transcription but any obvious errors or omissions should be clarified with the author of the document.

## 2023-01-24 NOTE — PLAN OF CARE
Problem: Adult Inpatient Plan of Care  Goal: Plan of Care Review  Outcome: Ongoing, Progressing  Flowsheets (Taken 1/24/2023 0802)  Plan of Care Reviewed With: patient  Goal: Patient-Specific Goal (Individualized)  Outcome: Ongoing, Progressing  Goal: Absence of Hospital-Acquired Illness or Injury  Outcome: Ongoing, Progressing  Intervention: Identify and Manage Fall Risk  Flowsheets (Taken 1/24/2023 0802)  Safety Promotion/Fall Prevention:   assistive device/personal item within reach   nonskid shoes/socks when out of bed   medications reviewed  Intervention: Prevent Skin Injury  Flowsheets (Taken 1/24/2023 0802)  Body Position: position changed independently  Skin Protection:   adhesive use limited   tubing/devices free from skin contact  Intervention: Prevent and Manage VTE (Venous Thromboembolism) Risk  Flowsheets (Taken 1/24/2023 0802)  Activity Management: Ambulated to bathroom - L4  VTE Prevention/Management:   ROM (active) performed   ROM (passive) performed   bleeding precations maintained   fluids promoted  Range of Motion:   active ROM (range of motion) encouraged   ROM (range of motion) performed  Intervention: Prevent Infection  Flowsheets (Taken 1/24/2023 0802)  Infection Prevention:   hand hygiene promoted   rest/sleep promoted  Goal: Optimal Comfort and Wellbeing  Outcome: Ongoing, Progressing  Intervention: Monitor Pain and Promote Comfort  Flowsheets (Taken 1/24/2023 0802)  Pain Management Interventions: pillow support provided  Intervention: Provide Person-Centered Care  Flowsheets (Taken 1/24/2023 0802)  Trust Relationship/Rapport:   care explained   questions answered   questions encouraged   choices provided   emotional support provided   reassurance provided   empathic listening provided   thoughts/feelings acknowledged  Goal: Readiness for Transition of Care  Outcome: Ongoing, Progressing     Problem: Infection  Goal: Absence of Infection Signs and Symptoms  Outcome: Ongoing, Progressing

## 2023-01-25 VITALS
DIASTOLIC BLOOD PRESSURE: 77 MMHG | WEIGHT: 127 LBS | RESPIRATION RATE: 16 BRPM | OXYGEN SATURATION: 99 % | TEMPERATURE: 98 F | SYSTOLIC BLOOD PRESSURE: 125 MMHG | HEART RATE: 83 BPM | HEIGHT: 60 IN | BODY MASS INDEX: 24.94 KG/M2

## 2023-01-25 LAB
ANION GAP SERPL CALC-SCNC: 9 MEQ/L
BACTERIA BLD CULT: NORMAL
BACTERIA BLD CULT: NORMAL
BASOPHILS # BLD AUTO: 0.03 X10(3)/MCL (ref 0–0.2)
BASOPHILS NFR BLD AUTO: 0.3 %
BUN SERPL-MCNC: 10.3 MG/DL (ref 8.4–25.7)
CALCIUM SERPL-MCNC: 9.5 MG/DL (ref 8.4–10.2)
CHLORIDE SERPL-SCNC: 104 MMOL/L (ref 98–107)
CO2 SERPL-SCNC: 27 MMOL/L (ref 22–29)
CREAT SERPL-MCNC: 0.86 MG/DL (ref 0.73–1.18)
CREAT/UREA NIT SERPL: 12
CRP SERPL-MCNC: 14.4 MG/L
EOSINOPHIL # BLD AUTO: 0.21 X10(3)/MCL (ref 0–0.9)
EOSINOPHIL NFR BLD AUTO: 2.4 %
ERYTHROCYTE [DISTWIDTH] IN BLOOD BY AUTOMATED COUNT: 15.1 % (ref 11.5–17)
ERYTHROCYTE [SEDIMENTATION RATE] IN BLOOD: 69 MM/HR (ref 0–15)
GFR SERPLBLD CREATININE-BSD FMLA CKD-EPI: >60 MLS/MIN/1.73/M2
GLUCOSE SERPL-MCNC: 90 MG/DL (ref 74–100)
HCT VFR BLD AUTO: 34.8 % (ref 42–52)
HGB BLD-MCNC: 11.5 GM/DL (ref 14–18)
IMM GRANULOCYTES # BLD AUTO: 0.03 X10(3)/MCL (ref 0–0.04)
IMM GRANULOCYTES NFR BLD AUTO: 0.3 %
LYMPHOCYTES # BLD AUTO: 1.19 X10(3)/MCL (ref 0.6–4.6)
LYMPHOCYTES NFR BLD AUTO: 13.4 %
MCH RBC QN AUTO: 27.4 PG
MCHC RBC AUTO-ENTMCNC: 33 MG/DL (ref 33–36)
MCV RBC AUTO: 83.1 FL (ref 80–94)
MONOCYTES # BLD AUTO: 0.6 X10(3)/MCL (ref 0.1–1.3)
MONOCYTES NFR BLD AUTO: 6.8 %
NEUTROPHILS # BLD AUTO: 6.79 X10(3)/MCL (ref 2.1–9.2)
NEUTROPHILS NFR BLD AUTO: 76.8 %
NRBC BLD AUTO-RTO: 0 %
PLATELET # BLD AUTO: 297 X10(3)/MCL (ref 130–400)
PMV BLD AUTO: 10.3 FL (ref 7.4–10.4)
POTASSIUM SERPL-SCNC: 4.2 MMOL/L (ref 3.5–5.1)
RBC # BLD AUTO: 4.19 X10(6)/MCL (ref 4.7–6.1)
SODIUM SERPL-SCNC: 140 MMOL/L (ref 136–145)
WBC # SPEC AUTO: 8.9 X10(3)/MCL (ref 4.5–11.5)

## 2023-01-25 PROCEDURE — 80048 BASIC METABOLIC PNL TOTAL CA: CPT | Performed by: STUDENT IN AN ORGANIZED HEALTH CARE EDUCATION/TRAINING PROGRAM

## 2023-01-25 PROCEDURE — 85025 COMPLETE CBC W/AUTO DIFF WBC: CPT | Performed by: STUDENT IN AN ORGANIZED HEALTH CARE EDUCATION/TRAINING PROGRAM

## 2023-01-25 PROCEDURE — 25000003 PHARM REV CODE 250: Performed by: NURSE PRACTITIONER

## 2023-01-25 PROCEDURE — 25000003 PHARM REV CODE 250: Performed by: STUDENT IN AN ORGANIZED HEALTH CARE EDUCATION/TRAINING PROGRAM

## 2023-01-25 PROCEDURE — 85651 RBC SED RATE NONAUTOMATED: CPT | Performed by: STUDENT IN AN ORGANIZED HEALTH CARE EDUCATION/TRAINING PROGRAM

## 2023-01-25 PROCEDURE — 36415 COLL VENOUS BLD VENIPUNCTURE: CPT | Performed by: STUDENT IN AN ORGANIZED HEALTH CARE EDUCATION/TRAINING PROGRAM

## 2023-01-25 PROCEDURE — 86140 C-REACTIVE PROTEIN: CPT | Performed by: STUDENT IN AN ORGANIZED HEALTH CARE EDUCATION/TRAINING PROGRAM

## 2023-01-25 PROCEDURE — 63600175 PHARM REV CODE 636 W HCPCS: Performed by: INTERNAL MEDICINE

## 2023-01-25 RX ORDER — CEFTRIAXONE 2 G/50ML
2 INJECTION, SOLUTION INTRAVENOUS EVERY 12 HOURS
Start: 2023-01-25 | End: 2023-05-02

## 2023-01-25 RX ORDER — AMLODIPINE BESYLATE 5 MG/1
5 TABLET ORAL DAILY
Qty: 30 TABLET | Refills: 11 | Status: SHIPPED | OUTPATIENT
Start: 2023-01-26 | End: 2023-06-15

## 2023-01-25 RX ADMIN — THERA TABS 1 TABLET: TAB at 09:01

## 2023-01-25 RX ADMIN — CEFTRIAXONE 2 G: 2 INJECTION, SOLUTION INTRAVENOUS at 03:01

## 2023-01-25 RX ADMIN — AMLODIPINE BESYLATE 5 MG: 5 TABLET ORAL at 09:01

## 2023-01-25 NOTE — NURSING
Discharge instructions given to patient along with teaching on medication, appointments, and PICC care and use. Patient verbalized understanding, all questions answered, and notified that he will call If he has any further questions. Pre-discharge vital signs stable, all needs met, patient in no apparent distress. Patient was escorted from the hospital and received by his son in the ED driveway.

## 2023-01-25 NOTE — PLAN OF CARE
AVS sent to [facility] via [employer] [facility]. [facility]. Notified Sunday with [facility] of patient discharge today.

## 2023-01-25 NOTE — DISCHARGE SUMMARY
[facility] Hospital Medicine Discharge Summary    Admit Date: 1/16/2023  Discharge Date and Time: 1/25/202310:29 AM  Admitting Physician: RAYNE Team  Discharging Physician: Alexia Alvarez DO.  Primary Care Physician: Primary Doctor No      Discharge Diagnoses:  T spine diskitis/Osteomyelitis   MV Infective endocarditis  Severe MR   Anemia-unspecified.    Hospital Course:   52-year-old male  with no significant past medical history  presented to [facility] ED  on 01/16/2023 with complaint of back pain. His symptoms initially started in mid December 2022 with left knee pain and was seen at an urgent care and had left knee x-rays that were unremarkable and was prescribed NSAID, subsequently started having back pain and visited [facility] ED on 12/20/2022 and was diagnosed with lumbosacral strain and prescribed NSAID, tramadol and baclofen.  His mid lower back pain continued to get worse and over the past week started having nonproductive cough upon waking from sleep in the morning and had noticed that whenever he coughs his back pain worsens and radiates bilaterally to his abdomen.  Denies loss of bowel or bladder control or lower extremity weakness. Denies shortness breath, chest pain, fever or chills.     he arrived to [facility] ED today on 1/6/2023 where he found to be febrile 102.7, tachycardic and hypertensive.  His labs notable for WBC 12.3, ESR 63, CRP 89.  COVID 19 and flu negative. CTA chest show no pulmonary embolus or parenchymal lung disease, irregularity of T8 inferior endplate may relate to degenerative change but cannot exclude discitis/osteomyelitis.  CT lumbar spine showed degenerative changes and spondylolisthesis at L5-S1. He was given IV fluid, ceftriaxone and vancomycin and  transferred to [facility] on 1/16/23 and referred to hospital medicine service for further evaluation and management.  Large mitral valve vegetation with valvular perforation and severe mitral regurgitation, EF  preserved.--consult placed for ID physician who recommends 4-6wks of antibiotic therapy--orders given to CM for IV antibiotics outpatient; bioscripts will assist and patient will discharge with [facility] services.   CT surgeon's recommendations pending--plan for valve replacement--workup in progress, patient will followup outpatient for continued evaluation and treatment per CT surgeons recommendations   Back pain concerning for thoracic spine diskitis--etiology unknown---no plans for surgical intervention     Hospital course and discharge care plan has been discussed with patient, patient voices understanding and agreement with plan. All questions have been answered to the best of my ability. Patient is advised to return to ED or call 911 in case of emergency and or if symptoms worsen.    Vitals:  VITAL SIGNS: 24 HRS MIN & MAX LAST   Temp  Min: 98 °F (36.7 °C)  Max: 98.4 °F (36.9 °C) 98.2 °F (36.8 °C)   BP  Min: 119/79  Max: 144/81 125/77   Pulse  Min: 72  Max: 83  83   Resp  Min: 16  Max: 20 16   SpO2  Min: 99 %  Max: 100 % 99 %       Physical Exam:    General: Appears comfortable, no acute distress.  Integumentary: Warm, dry, intact.  Musculoskeletal: Purposeful movement noted.   Respiratory: No accessory muscle use. Breath sounds are equal.  Cardiovascular: Regular rate. No peripheral edema.      Procedures Performed: No admission procedures for hospital encounter.     Significant Diagnostic Studies: See Full reports for all details    Recent Labs   Lab 01/19/23  0432 01/22/23  0604 01/25/23  0430   WBC 7.5 9.5 8.9   RBC 3.99* 3.96* 4.19*   HGB 10.8* 10.6* 11.5*   HCT 33.1* 32.4* 34.8*   MCV 83.0 81.8 83.1   MCH 27.1 26.8 27.4   MCHC 32.6* 32.7* 33.0   RDW 13.6 14.2 15.1    256 297   MPV 10.6* 11.2* 10.3       Recent Labs   Lab 01/19/23  0432 01/22/23  0603 01/25/23  0430    139 140   K 3.9 4.0 4.2   CO2 26 25 27   BUN 9.1 8.0* 10.3   CREATININE 0.89 0.85 0.86   CALCIUM 9.0 9.3 9.5   MG  1.90 1.90  --         Microbiology Results (last 7 days)       Procedure Component Value Units Date/Time    Blood Culture [[id number]]  (Normal) Collected: 01/20/23 1558    Order Status: Completed Specimen: Blood Updated: 01/24/23 1701     CULTURE, BLOOD ([facility]) No Growth At 96 Hours    Blood Culture [[id number]]  (Normal) Collected: 01/20/23 1559    Order Status: Completed Specimen: Blood Updated: 01/24/23 1701     CULTURE, BLOOD ([facility]) No Growth At 96 Hours             X-Ray Chest 1 View for Line/Tube Placement  Narrative: EXAMINATION:  XR CHEST 1 VIEW FOR LINE/TUBE PLACEMENT    CLINICAL HISTORY:  picc;, .    COMPARISON:  January 16, 2023    FINDINGS:  No alveolar consolidation, effusion, or pneumothorax is seen.   The thoracic aorta is normal  cardiac silhouette, central pulmonary vessels and mediastinum are normal in size and are grossly unremarkable.   visualized osseous structures are grossly unremarkable..    Interval insertion of right-sided PICC line tip projecting at the junction of the superior vena cava and right atrium  Impression: No acute chest disease is identified.    Electronically signed by: Dillon Varghese  Date:    01/24/2023  Time:    14:39         Medication List        START taking these medications      amLODIPine 5 MG tablet  Commonly known as: NORVASC  Take 1 tablet (5 mg total) by mouth once daily.  Start taking on: January 26, 2023     cefTRIAXone 2 gram/50 mL IVPB  Commonly known as: ROCEPHIN  Inject 50 mLs (2 g total) into the vein every 12 (twelve) hours.               Where to Get Your Medications        These medications were sent to [facility]/pharmacy #[id number] [address] AT [address] [address]      Phone: [phone]   amLODIPine 5 MG tablet       Information about where to get these medications is not yet available    Ask your nurse or doctor about these medications  cefTRIAXone 2 gram/50 mL IVPB          Explained in detail to the  patient about the discharge plan, medications, and follow-up visits. Pt understands and agrees with the treatment plan  Discharge Disposition: Home with HH and IV infusion therapy  Discharged Condition: stable  Diet-   Dietary Orders (From admission, onward)       Start     Ordered    01/21/23 1714  Dietary nutrition supplements Boost Vanilla; BID  Continuous        Question Answer Comment   Select PO Supplement: Boost Vanilla    Frequency: BID        01/21/23 1713    01/19/23 1458  Diet Adult Regular  Diet effective now         01/19/23 1458                   Medications Per DC med rec  Activities as tolerated   Follow-up Information       Speedy Rendon Jr, MD Follow up on 2/3/2023.    Specialty: Cardiology  Why: @0940  rodriguez w/ silke  Contact information:  [address]  [place] [id number] [phone]               Jose Ramon Rodrigues MD Follow up on 2/27/2023.    Specialties: Cardiothoracic Surgery, Cardiology  Why: @0800  spoke w/Ashlie  Contact information:  [address]  [address]  [place] [address]  [phone]               [facility] Follow up.    Specialties: Home Health Services, Home Therapy Services, Home Living Aide Services  Why: This is your home health agency  Contact information:  [address]  [place] [address]  [phone]             [facility] Follow up.    Specialty: Infusion Provider  Why: This is the company that will provide your antibiotics  Contact information:  [address]  [address]  [place] [address]  [phone]               followup with Dr. Rivera in 2wks Follow up in 2 week(s).                           For further questions contact hospitalist office    Discharge time 33 minutes    For worsening symptoms, chest pain, shortness of breath, increased abdominal pain, high grade fever, stroke or stroke like symptoms, immediately go to the nearest Emergency Room or call 911 as soon as possible.      Alexia ALONZO  Niels WILKS on 1/25/2023. at 10:29 AM.

## 2023-01-27 DIAGNOSIS — I33.0 INFECTIVE ENDOCARDITIS OF MITRAL VALVE: ICD-10-CM

## 2023-01-27 DIAGNOSIS — I34.0 SEVERE MITRAL VALVE REGURGITATION: Primary | ICD-10-CM

## 2023-01-27 RX ORDER — HYDROCODONE BITARTRATE AND ACETAMINOPHEN 500; 5 MG/1; MG/1
TABLET ORAL
Status: CANCELLED | OUTPATIENT
Start: 2023-01-27

## 2023-01-27 RX ORDER — MUPIROCIN 20 MG/G
OINTMENT TOPICAL
Status: CANCELLED | OUTPATIENT
Start: 2023-01-27 | End: 2023-01-27

## 2023-01-30 ENCOUNTER — TELEPHONE (OUTPATIENT)
Dept: CARDIAC SURGERY | Facility: CLINIC | Age: 53
End: 2023-01-30

## 2023-01-30 ENCOUNTER — OFFICE VISIT (OUTPATIENT)
Dept: CARDIAC SURGERY | Facility: CLINIC | Age: 53
End: 2023-01-30

## 2023-01-30 ENCOUNTER — LAB REQUISITION (OUTPATIENT)
Dept: LAB | Facility: HOSPITAL | Age: 53
End: 2023-01-30

## 2023-01-30 VITALS
HEIGHT: 60 IN | HEART RATE: 97 BPM | SYSTOLIC BLOOD PRESSURE: 132 MMHG | DIASTOLIC BLOOD PRESSURE: 85 MMHG | BODY MASS INDEX: 24.15 KG/M2 | WEIGHT: 123 LBS

## 2023-01-30 DIAGNOSIS — M46.44 DISCITIS, UNSPECIFIED, THORACIC REGION: ICD-10-CM

## 2023-01-30 DIAGNOSIS — M46.20 OSTEOMYELITIS OF VERTEBRA, SITE UNSPECIFIED: ICD-10-CM

## 2023-01-30 DIAGNOSIS — R78.81 BACTEREMIA: ICD-10-CM

## 2023-01-30 DIAGNOSIS — I33.0 INFECTIVE ENDOCARDITIS OF MITRAL VALVE: ICD-10-CM

## 2023-01-30 DIAGNOSIS — I34.0 SEVERE MITRAL VALVE REGURGITATION: Primary | ICD-10-CM

## 2023-01-30 DIAGNOSIS — I34.0 SEVERE MITRAL VALVE REGURGITATION: ICD-10-CM

## 2023-01-30 LAB
ALBUMIN SERPL-MCNC: 3.3 G/DL (ref 3.5–5)
ALBUMIN/GLOB SERPL: 0.7 RATIO (ref 1.1–2)
ALP SERPL-CCNC: 85 UNIT/L (ref 40–150)
ALT SERPL-CCNC: 47 UNIT/L (ref 0–55)
AST SERPL-CCNC: 35 UNIT/L (ref 5–34)
BASOPHILS # BLD AUTO: 0.04 X10(3)/MCL (ref 0–0.2)
BASOPHILS NFR BLD AUTO: 0.6 %
BILIRUBIN DIRECT+TOT PNL SERPL-MCNC: 0.3 MG/DL
BUN SERPL-MCNC: 11.6 MG/DL (ref 8.4–25.7)
CALCIUM SERPL-MCNC: 9.9 MG/DL (ref 8.4–10.2)
CHLORIDE SERPL-SCNC: 100 MMOL/L (ref 98–107)
CO2 SERPL-SCNC: 28 MMOL/L (ref 22–29)
CREAT SERPL-MCNC: 0.8 MG/DL (ref 0.73–1.18)
CRP SERPL-MCNC: 56.6 MG/L
EOSINOPHIL # BLD AUTO: 0.04 X10(3)/MCL (ref 0–0.9)
EOSINOPHIL NFR BLD AUTO: 0.6 %
ERYTHROCYTE [DISTWIDTH] IN BLOOD BY AUTOMATED COUNT: 14.7 % (ref 11.5–17)
ERYTHROCYTE [SEDIMENTATION RATE] IN BLOOD: 88 MM/HR (ref 0–15)
GFR SERPLBLD CREATININE-BSD FMLA CKD-EPI: >60 MLS/MIN/1.73/M2
GLOBULIN SER-MCNC: 4.7 GM/DL (ref 2.4–3.5)
GLUCOSE SERPL-MCNC: 71 MG/DL (ref 74–100)
HCT VFR BLD AUTO: 35.7 % (ref 42–52)
HGB BLD-MCNC: 11.7 GM/DL (ref 14–18)
IMM GRANULOCYTES # BLD AUTO: 0.02 X10(3)/MCL (ref 0–0.04)
IMM GRANULOCYTES NFR BLD AUTO: 0.3 %
LYMPHOCYTES # BLD AUTO: 0.89 X10(3)/MCL (ref 0.6–4.6)
LYMPHOCYTES NFR BLD AUTO: 14.3 %
MCH RBC QN AUTO: 27.1 PG
MCHC RBC AUTO-ENTMCNC: 32.8 MG/DL (ref 33–36)
MCV RBC AUTO: 82.6 FL (ref 80–94)
MONOCYTES # BLD AUTO: 0.73 X10(3)/MCL (ref 0.1–1.3)
MONOCYTES NFR BLD AUTO: 11.7 %
NEUTROPHILS # BLD AUTO: 4.5 X10(3)/MCL (ref 2.1–9.2)
NEUTROPHILS NFR BLD AUTO: 72.5 %
NRBC BLD AUTO-RTO: 0 %
PLATELET # BLD AUTO: 272 X10(3)/MCL (ref 130–400)
PMV BLD AUTO: 10.9 FL (ref 7.4–10.4)
POTASSIUM SERPL-SCNC: 4.4 MMOL/L (ref 3.5–5.1)
PROT SERPL-MCNC: 8 GM/DL (ref 6.4–8.3)
RBC # BLD AUTO: 4.32 X10(6)/MCL (ref 4.7–6.1)
SODIUM SERPL-SCNC: 136 MMOL/L (ref 136–145)
WBC # SPEC AUTO: 6.2 X10(3)/MCL (ref 4.5–11.5)

## 2023-01-30 PROCEDURE — 85025 COMPLETE CBC W/AUTO DIFF WBC: CPT | Performed by: INTERNAL MEDICINE

## 2023-01-30 PROCEDURE — 80053 COMPREHEN METABOLIC PANEL: CPT | Performed by: INTERNAL MEDICINE

## 2023-01-30 PROCEDURE — 86140 C-REACTIVE PROTEIN: CPT | Performed by: INTERNAL MEDICINE

## 2023-01-30 PROCEDURE — 85651 RBC SED RATE NONAUTOMATED: CPT | Performed by: INTERNAL MEDICINE

## 2023-01-30 PROCEDURE — 99214 OFFICE O/P EST MOD 30 MIN: CPT | Mod: ,,, | Performed by: THORACIC SURGERY (CARDIOTHORACIC VASCULAR SURGERY)

## 2023-01-30 PROCEDURE — 99214 PR OFFICE/OUTPT VISIT, EST, LEVL IV, 30-39 MIN: ICD-10-PCS | Mod: ,,, | Performed by: THORACIC SURGERY (CARDIOTHORACIC VASCULAR SURGERY)

## 2023-01-30 RX ORDER — MUPIROCIN 20 MG/G
OINTMENT TOPICAL DAILY
Qty: 1 EACH | Refills: 0 | Status: SHIPPED | OUTPATIENT
Start: 2023-01-30 | End: 2023-06-15

## 2023-01-30 RX ORDER — MUPIROCIN 20 MG/G
OINTMENT TOPICAL DAILY
COMMUNITY
End: 2023-01-30 | Stop reason: SDUPTHER

## 2023-01-30 NOTE — ASSESSMENT & PLAN NOTE
Patient has now been on antibiotics for approximately 2 weeks.  He continues to have some fever and increasing shortness of breath.  Would recommend surgical therapy as soon as possible and have scheduled mitral valve replacement on February 8, 2023.  Continue appropriate antibiotics as directed by Infectious Disease.

## 2023-01-30 NOTE — ASSESSMENT & PLAN NOTE
As the patient has had persistent bacteremia despite appropriate antibiotics and continues to have some fever and increasing shortness of breath I think that surgery would be the best option at this point to eradicate the organism.  And thus, I have scheduled mitral valve replacement and left atrial appendage ligation on February 8, 2023.

## 2023-01-30 NOTE — ASSESSMENT & PLAN NOTE
As the patient has had persistent bacteremia and fever, I recommend mitral valve replacement as soon as possible.  Risks, benefits, and alternatives have been discussed.  Questions have been answered.  The patient voices understanding and agrees to proceed.  The patient desires a mechanical prosthesis.

## 2023-01-30 NOTE — PROGRESS NOTES
[facility] - 8th Floor Med Surg  Cardiothoracic Surgery  Consult Note    Patient Name: Flako Power  MRN: [id number]  Admission Date: (Not on file)  Attending Physician: No att. providers found  Referring Provider: No ref. provider found    Patient information was obtained from patient, past medical records, ER records, and primary team.       Subjective:     Chief Complaint/Reason for Admission:  Increasing shortness of breath with dyspnea    History of Present Illness: This is a 52-year-old male  with no significant past medical history  presented to [facility] ED  on 01/16/2023 with complaint of back pain. His  symptoms initially started in mid December 2022  with left knee pain and was seen at  at an urgent care and had left knees x-rays that were unremarkable and was prescribed NSAID,  subsequently started having back pain  and visited [facility] ED on 12/20/2022 and was diagnosed with lumbosacral strain and prescribed NSAID, tramadol and baclofen.  His mid lower back pain continued to get worse and over the past week started having nonproductive cough upon waking from sleep in the morning and had noticed that whenever he coughs his back pain worsens and radiates bilaterally to his abdomen.  Denies loss of bowel or bladder control or lower extremity weakness. Denies shortness breath, chest pain, fever or chills until he arrived to [facility] ED today on 1/6/2023 where he found to be febrile 102.7, tachycardic and hypertensive.  His labs notable for WBC 12.3, ESR 63, CRP 89.  COVID 19 and flu negative. CTA chest show no pulmonary embolus or parenchymal lung disease, irregularity of T8 inferior endplate may relate to degenerative change but cannot exclude discitis/osteomyelitis.  CT lumbar spine showed degenerative changes and spondylolisthesis at L5-S1. He was given IV fluid, ceftriaxone and vancomycin and  transferred to [facility] on 1/16/23 and referred to hospital medicine service for further evaluation and  management.    He has subsequently been found on transesophageal echocardiogram to have severe mitral regurgitation with what appears to be a tethered anterior mitral valve leaflet.  There does not seem to be any evidence of vegetation, however, it is hard to say.  There was no evidence abscess or large valvular vegetation greater than 1 cm.  There is also no evidence of discrete embolization.  Blood cultures have been positive for Gram-positive organisms and we await Infectious Disease recommendations.    Transesophageal echocardiogram and left heart catheterization have been reviewed by me and interpreted by me.      The patient has had increasing fever and persistent bacteremia.  I am concerned that his heart failure will continue to worsen and the bacteremia will cause sequelae which will be life-threatening.  Therefore I feel that surgical therapy is warranted and have scheduled mitral valve replacement for February 8, 2023.        Current Outpatient Medications on File Prior to Visit   Medication Sig    amLODIPine (NORVASC) 5 MG tablet Take 1 tablet (5 mg total) by mouth once daily.    cefTRIAXone (ROCEPHIN) 2 gram/50 mL IVPB Inject 50 mLs (2 g total) into the vein every 12 (twelve) hours.     No current facility-administered medications on file prior to visit.       Review of patient's allergies indicates:  No Known Allergies    History reviewed. No pertinent past medical history.  Past Surgical History:   Procedure Laterality Date    LEFT HEART CATHETERIZATION N/A 1/19/2023    Procedure: Left heart cath;  Surgeon: Speedy Rendon Jr., MD;  Location: [facility] CATH LAB;  Service: Cardiology;  Laterality: N/A;  Diagnostic Procedure Only- Severe MR Awaiting Surgery.     Family History    None       Tobacco Use    Smoking status: Never    Smokeless tobacco: Never   Substance and Sexual Activity    Alcohol use: Not on file    Drug use: Not on file    Sexual activity: Not on file     Review of Systems    Constitutional:  Negative for activity change, chills, diaphoresis and fever.   HENT:  Positive for sore throat. Negative for congestion, dental problem, drooling, mouth sores, trouble swallowing and voice change.    Eyes: Negative.    Respiratory:  Positive for shortness of breath. Negative for apnea, cough, choking, chest tightness, wheezing and stridor.    Cardiovascular:  Positive for palpitations. Negative for chest pain and leg swelling.   Gastrointestinal:  Negative for abdominal distention, abdominal pain, blood in stool, nausea and vomiting.   Endocrine: Negative.    Musculoskeletal:  Negative for arthralgias, back pain, gait problem, neck pain and neck stiffness.   Skin:  Negative for color change, pallor, rash and wound.   Allergic/Immunologic: Negative.    Neurological:  Negative for dizziness, seizures, syncope, facial asymmetry, weakness, light-headedness and headaches.   Hematological:  Negative for adenopathy.   Psychiatric/Behavioral:  Negative for agitation and hallucinations.    Objective:     Vital Signs (Most Recent):  Pulse: 97 (01/30/23 1156)  BP: 132/85 (01/30/23 1156)   Vital Signs (24h Range):  Temp:  [98.4 °F (36.9 °C)-98.7 °F (37.1 °C)] 98.7 °F (37.1 °C)  Pulse:  [68-82] 77  Resp:  [18] 18  SpO2:  [98 %-100 %] 99 %  BP: (132-150)/(82-92) 145/90     Weight: 55.8 kg (123 lb)  Body mass index is 24.84 kg/m².             Intake/Output - Last 3 Shifts         01/17 0700  01/18 0659 01/18 0700  01/19 0659 01/19 0700  01/20 0659    P.O. 0 500     I.V. (mL/kg) 1000 (17.4)  900 (15.6)    IV Piggyback  500     Total Intake(mL/kg) 1000 (17.4) 1000 (17.4) 900 (15.6)    Urine (mL/kg/hr) 450 (0.3) 1450 (1)     Stool 0 0     Total Output 450 1450     Net +550 -450 +900           Stool Occurrence 0 x 2 x              Lines/Drains/Airways       Peripherally Inserted Central Catheter Line  Duration             PICC Double Lumen 01/24/23 1357 right basilic 5 days                     STS Risk Score:  6%    Physical Exam  Vitals and nursing note reviewed.   Constitutional:       General: He is not in acute distress.     Appearance: Normal appearance.   HENT:      Head: Normocephalic and atraumatic.      Nose: Nose normal. No congestion.      Mouth/Throat:      Mouth: Mucous membranes are moist.   Eyes:      General: No scleral icterus.     Extraocular Movements: Extraocular movements intact.      Conjunctiva/sclera: Conjunctivae normal.      Pupils: Pupils are equal, round, and reactive to light.   Neck:      Thyroid: No thyroid mass or thyromegaly.      Vascular: JVD present. No carotid bruit.   Cardiovascular:      Rate and Rhythm: Normal rate and regular rhythm.      Pulses: Normal pulses.           Carotid pulses are 2+ on the right side and 2+ on the left side.       Radial pulses are 2+ on the right side and 2+ on the left side.        Femoral pulses are 2+ on the right side and 2+ on the left side.       Dorsalis pedis pulses are 2+ on the right side and 2+ on the left side.        Posterior tibial pulses are 2+ on the right side and 2+ on the left side.      Heart sounds: Murmur heard.   Systolic (Left lower sternal border) murmur is present with a grade of 2/6.     No friction rub. No gallop.   Pulmonary:      Effort: Pulmonary effort is normal. No respiratory distress.      Breath sounds: No stridor. Examination of the right-lower field reveals rales. Examination of the left-lower field reveals rales. Rales present. No wheezing or rhonchi.   Chest:      Chest wall: No tenderness.   Abdominal:      General: Abdomen is flat. Bowel sounds are normal. There is no distension.      Palpations: Abdomen is soft. There is no hepatomegaly, splenomegaly, mass or pulsatile mass.      Tenderness: There is no abdominal tenderness. There is no rebound.   Musculoskeletal:         General: No swelling. Normal range of motion.      Cervical back: Normal range of motion. No rigidity or tenderness.      Right lower le+  Edema present.      Left lower le+ Edema present.   Lymphadenopathy:      Cervical: No cervical adenopathy.      Upper Body:      Right upper body: No supraclavicular or axillary adenopathy.      Left upper body: No supraclavicular or axillary adenopathy.   Skin:     General: Skin is warm and dry.   Neurological:      General: No focal deficit present.      Mental Status: He is alert and oriented to person, place, and time. Mental status is at baseline.      Cranial Nerves: No cranial nerve deficit.      Sensory: No sensory deficit.      Motor: No weakness.      Gait: Gait normal.   Psychiatric:         Mood and Affect: Mood normal.       Significant Labs:  ABGs: No results for input(s): PH, PCO2, PO2, HCO3, POCSATURATED, BE in the last 48 hours.  CBC:   Recent Labs   Lab 23  043   WBC 7.5   RBC 3.99*   HGB 10.8*   HCT 33.1*      MCV 83.0   MCH 27.1   MCHC 32.6*     CMP:   Recent Labs   Lab 23  043   CALCIUM 9.0      K 3.9   CO2 26   BUN 9.1   CREATININE 0.89     Coagulation: No results for input(s): PT, INR, APTT in the last 48 hours.  All pertinent labs from the last 24 hours have been reviewed.    Significant Diagnostics:  CXR: I have reviewed all pertinent results/findings within the past 24 hours  Echo: I have reviewed all pertinent results/findings within the past 24 hours  EKG: I have reviewed all pertinent results/findings within the past 24 hours  LAXMI: I have reviewed all pertinent results/findings within the past 24 hours  U/S: I have reviewed all pertinent results/findings within the past 24 hours  I have reviewed and interpreted all pertinent imaging results/findings within the past 24 hours.    Assessment/Plan:     NYHA Score: NYHA II: slight limitation of physical activity, comfortable at rest    There are no hospital problems to display for this patient.      Thank you for your consult. I will follow-up with patient. Please contact us if you have any additional  questions.    Jose Ramon Rodrigues MD  Cardiothoracic Surgery  [facility] - 8th Floor Med Surg

## 2023-01-30 NOTE — H&P (VIEW-ONLY)
[facility] - 8th Floor Med Surg  Cardiothoracic Surgery  Consult Note    Patient Name: Flako Power  MRN: [id number]  Admission Date: (Not on file)  Attending Physician: No att. providers found  Referring Provider: No ref. provider found    Patient information was obtained from patient, past medical records, ER records, and primary team.       Subjective:     Chief Complaint/Reason for Admission:  Increasing shortness of breath with dyspnea    History of Present Illness: This is a 52-year-old male  with no significant past medical history  presented to [facility] ED  on 01/16/2023 with complaint of back pain. His  symptoms initially started in mid December 2022  with left knee pain and was seen at  at an urgent care and had left knees x-rays that were unremarkable and was prescribed NSAID,  subsequently started having back pain  and visited [facility] ED on 12/20/2022 and was diagnosed with lumbosacral strain and prescribed NSAID, tramadol and baclofen.  His mid lower back pain continued to get worse and over the past week started having nonproductive cough upon waking from sleep in the morning and had noticed that whenever he coughs his back pain worsens and radiates bilaterally to his abdomen.  Denies loss of bowel or bladder control or lower extremity weakness. Denies shortness breath, chest pain, fever or chills until he arrived to [facility] ED today on 1/6/2023 where he found to be febrile 102.7, tachycardic and hypertensive.  His labs notable for WBC 12.3, ESR 63, CRP 89.  COVID 19 and flu negative. CTA chest show no pulmonary embolus or parenchymal lung disease, irregularity of T8 inferior endplate may relate to degenerative change but cannot exclude discitis/osteomyelitis.  CT lumbar spine showed degenerative changes and spondylolisthesis at L5-S1. He was given IV fluid, ceftriaxone and vancomycin and  transferred to [facility] on 1/16/23 and referred to hospital medicine service for further evaluation and  management.    He has subsequently been found on transesophageal echocardiogram to have severe mitral regurgitation with what appears to be a tethered anterior mitral valve leaflet.  There does not seem to be any evidence of vegetation, however, it is hard to say.  There was no evidence abscess or large valvular vegetation greater than 1 cm.  There is also no evidence of discrete embolization.  Blood cultures have been positive for Gram-positive organisms and we await Infectious Disease recommendations.    Transesophageal echocardiogram and left heart catheterization have been reviewed by me and interpreted by me.      The patient has had increasing fever and persistent bacteremia.  I am concerned that his heart failure will continue to worsen and the bacteremia will cause sequelae which will be life-threatening.  Therefore I feel that surgical therapy is warranted and have scheduled mitral valve replacement for February 8, 2023.        Current Outpatient Medications on File Prior to Visit   Medication Sig    amLODIPine (NORVASC) 5 MG tablet Take 1 tablet (5 mg total) by mouth once daily.    cefTRIAXone (ROCEPHIN) 2 gram/50 mL IVPB Inject 50 mLs (2 g total) into the vein every 12 (twelve) hours.     No current facility-administered medications on file prior to visit.       Review of patient's allergies indicates:  No Known Allergies    History reviewed. No pertinent past medical history.  Past Surgical History:   Procedure Laterality Date    LEFT HEART CATHETERIZATION N/A 1/19/2023    Procedure: Left heart cath;  Surgeon: Speedy Rendon Jr., MD;  Location: [facility] CATH LAB;  Service: Cardiology;  Laterality: N/A;  Diagnostic Procedure Only- Severe MR Awaiting Surgery.     Family History    None       Tobacco Use    Smoking status: Never    Smokeless tobacco: Never   Substance and Sexual Activity    Alcohol use: Not on file    Drug use: Not on file    Sexual activity: Not on file     Review of Systems    Constitutional:  Negative for activity change, chills, diaphoresis and fever.   HENT:  Positive for sore throat. Negative for congestion, dental problem, drooling, mouth sores, trouble swallowing and voice change.    Eyes: Negative.    Respiratory:  Positive for shortness of breath. Negative for apnea, cough, choking, chest tightness, wheezing and stridor.    Cardiovascular:  Positive for palpitations. Negative for chest pain and leg swelling.   Gastrointestinal:  Negative for abdominal distention, abdominal pain, blood in stool, nausea and vomiting.   Endocrine: Negative.    Musculoskeletal:  Negative for arthralgias, back pain, gait problem, neck pain and neck stiffness.   Skin:  Negative for color change, pallor, rash and wound.   Allergic/Immunologic: Negative.    Neurological:  Negative for dizziness, seizures, syncope, facial asymmetry, weakness, light-headedness and headaches.   Hematological:  Negative for adenopathy.   Psychiatric/Behavioral:  Negative for agitation and hallucinations.    Objective:     Vital Signs (Most Recent):  Pulse: 97 (01/30/23 1156)  BP: 132/85 (01/30/23 1156)   Vital Signs (24h Range):  Temp:  [98.4 °F (36.9 °C)-98.7 °F (37.1 °C)] 98.7 °F (37.1 °C)  Pulse:  [68-82] 77  Resp:  [18] 18  SpO2:  [98 %-100 %] 99 %  BP: (132-150)/(82-92) 145/90     Weight: 55.8 kg (123 lb)  Body mass index is 24.84 kg/m².             Intake/Output - Last 3 Shifts         01/17 0700  01/18 0659 01/18 0700  01/19 0659 01/19 0700  01/20 0659    P.O. 0 500     I.V. (mL/kg) 1000 (17.4)  900 (15.6)    IV Piggyback  500     Total Intake(mL/kg) 1000 (17.4) 1000 (17.4) 900 (15.6)    Urine (mL/kg/hr) 450 (0.3) 1450 (1)     Stool 0 0     Total Output 450 1450     Net +550 -450 +900           Stool Occurrence 0 x 2 x              Lines/Drains/Airways       Peripherally Inserted Central Catheter Line  Duration             PICC Double Lumen 01/24/23 1357 right basilic 5 days                     STS Risk Score:  6%    Physical Exam  Vitals and nursing note reviewed.   Constitutional:       General: He is not in acute distress.     Appearance: Normal appearance.   HENT:      Head: Normocephalic and atraumatic.      Nose: Nose normal. No congestion.      Mouth/Throat:      Mouth: Mucous membranes are moist.   Eyes:      General: No scleral icterus.     Extraocular Movements: Extraocular movements intact.      Conjunctiva/sclera: Conjunctivae normal.      Pupils: Pupils are equal, round, and reactive to light.   Neck:      Thyroid: No thyroid mass or thyromegaly.      Vascular: JVD present. No carotid bruit.   Cardiovascular:      Rate and Rhythm: Normal rate and regular rhythm.      Pulses: Normal pulses.           Carotid pulses are 2+ on the right side and 2+ on the left side.       Radial pulses are 2+ on the right side and 2+ on the left side.        Femoral pulses are 2+ on the right side and 2+ on the left side.       Dorsalis pedis pulses are 2+ on the right side and 2+ on the left side.        Posterior tibial pulses are 2+ on the right side and 2+ on the left side.      Heart sounds: Murmur heard.   Systolic (Left lower sternal border) murmur is present with a grade of 2/6.     No friction rub. No gallop.   Pulmonary:      Effort: Pulmonary effort is normal. No respiratory distress.      Breath sounds: No stridor. Examination of the right-lower field reveals rales. Examination of the left-lower field reveals rales. Rales present. No wheezing or rhonchi.   Chest:      Chest wall: No tenderness.   Abdominal:      General: Abdomen is flat. Bowel sounds are normal. There is no distension.      Palpations: Abdomen is soft. There is no hepatomegaly, splenomegaly, mass or pulsatile mass.      Tenderness: There is no abdominal tenderness. There is no rebound.   Musculoskeletal:         General: No swelling. Normal range of motion.      Cervical back: Normal range of motion. No rigidity or tenderness.      Right lower le+  Edema present.      Left lower le+ Edema present.   Lymphadenopathy:      Cervical: No cervical adenopathy.      Upper Body:      Right upper body: No supraclavicular or axillary adenopathy.      Left upper body: No supraclavicular or axillary adenopathy.   Skin:     General: Skin is warm and dry.   Neurological:      General: No focal deficit present.      Mental Status: He is alert and oriented to person, place, and time. Mental status is at baseline.      Cranial Nerves: No cranial nerve deficit.      Sensory: No sensory deficit.      Motor: No weakness.      Gait: Gait normal.   Psychiatric:         Mood and Affect: Mood normal.       Significant Labs:  ABGs: No results for input(s): PH, PCO2, PO2, HCO3, POCSATURATED, BE in the last 48 hours.  CBC:   Recent Labs   Lab 23  043   WBC 7.5   RBC 3.99*   HGB 10.8*   HCT 33.1*      MCV 83.0   MCH 27.1   MCHC 32.6*     CMP:   Recent Labs   Lab 23  043   CALCIUM 9.0      K 3.9   CO2 26   BUN 9.1   CREATININE 0.89     Coagulation: No results for input(s): PT, INR, APTT in the last 48 hours.  All pertinent labs from the last 24 hours have been reviewed.    Significant Diagnostics:  CXR: I have reviewed all pertinent results/findings within the past 24 hours  Echo: I have reviewed all pertinent results/findings within the past 24 hours  EKG: I have reviewed all pertinent results/findings within the past 24 hours  LAXMI: I have reviewed all pertinent results/findings within the past 24 hours  U/S: I have reviewed all pertinent results/findings within the past 24 hours  I have reviewed and interpreted all pertinent imaging results/findings within the past 24 hours.    Assessment/Plan:     NYHA Score: NYHA II: slight limitation of physical activity, comfortable at rest    There are no hospital problems to display for this patient.      Thank you for your consult. I will follow-up with patient. Please contact us if you have any additional  questions.    Jose Ramon Rodrigues MD  Cardiothoracic Surgery  [facility] - 8th Floor Med Surg

## 2023-02-06 ENCOUNTER — LAB REQUISITION (OUTPATIENT)
Dept: LAB | Facility: HOSPITAL | Age: 53
End: 2023-02-06
Payer: MEDICAID

## 2023-02-06 ENCOUNTER — HOSPITAL ENCOUNTER (OUTPATIENT)
Dept: RADIOLOGY | Facility: HOSPITAL | Age: 53
Discharge: HOME | End: 2023-02-06
Attending: THORACIC SURGERY (CARDIOTHORACIC VASCULAR SURGERY)
Payer: MEDICAID

## 2023-02-06 ENCOUNTER — ANESTHESIA EVENT (OUTPATIENT)
Dept: SURGERY | Facility: HOSPITAL | Age: 53
DRG: 219 | End: 2023-02-06
Payer: MEDICAID

## 2023-02-06 DIAGNOSIS — A41.9 SEPSIS, UNSPECIFIED ORGANISM: ICD-10-CM

## 2023-02-06 DIAGNOSIS — I33.0 INFECTIVE ENDOCARDITIS OF MITRAL VALVE: ICD-10-CM

## 2023-02-06 DIAGNOSIS — D64.9 ANEMIA, UNSPECIFIED: ICD-10-CM

## 2023-02-06 DIAGNOSIS — I34.0 SEVERE MITRAL VALVE REGURGITATION: ICD-10-CM

## 2023-02-06 DIAGNOSIS — M46.44 DISCITIS, UNSPECIFIED, THORACIC REGION: ICD-10-CM

## 2023-02-06 LAB
ALBUMIN SERPL-MCNC: 3.1 G/DL (ref 3.5–5)
ALBUMIN/GLOB SERPL: 0.7 RATIO (ref 1.1–2)
ALP SERPL-CCNC: 92 UNIT/L (ref 40–150)
ALT SERPL-CCNC: 64 UNIT/L (ref 0–55)
AST SERPL-CCNC: 39 UNIT/L (ref 5–34)
BASOPHILS # BLD AUTO: 0.03 X10(3)/MCL (ref 0–0.2)
BASOPHILS NFR BLD AUTO: 0.5 %
BILIRUBIN DIRECT+TOT PNL SERPL-MCNC: 0.2 MG/DL
BUN SERPL-MCNC: 9.8 MG/DL (ref 8.4–25.7)
CALCIUM SERPL-MCNC: 9.3 MG/DL (ref 8.4–10.2)
CHLORIDE SERPL-SCNC: 103 MMOL/L (ref 98–107)
CO2 SERPL-SCNC: 22 MMOL/L (ref 22–29)
CREAT SERPL-MCNC: 0.76 MG/DL (ref 0.73–1.18)
EOSINOPHIL # BLD AUTO: 0.09 X10(3)/MCL (ref 0–0.9)
EOSINOPHIL NFR BLD AUTO: 1.6 %
ERYTHROCYTE [DISTWIDTH] IN BLOOD BY AUTOMATED COUNT: 14.9 % (ref 11.5–17)
ERYTHROCYTE [SEDIMENTATION RATE] IN BLOOD: 81 MM/HR (ref 0–15)
GFR SERPLBLD CREATININE-BSD FMLA CKD-EPI: >60 MLS/MIN/1.73/M2
GLOBULIN SER-MCNC: 4.5 GM/DL (ref 2.4–3.5)
GLUCOSE SERPL-MCNC: 83 MG/DL (ref 74–100)
HCT VFR BLD AUTO: 34 % (ref 42–52)
HGB BLD-MCNC: 10.8 GM/DL (ref 14–18)
IMM GRANULOCYTES # BLD AUTO: 0.02 X10(3)/MCL (ref 0–0.04)
IMM GRANULOCYTES NFR BLD AUTO: 0.4 %
LYMPHOCYTES # BLD AUTO: 0.96 X10(3)/MCL (ref 0.6–4.6)
LYMPHOCYTES NFR BLD AUTO: 17.2 %
MCH RBC QN AUTO: 25.9 PG
MCHC RBC AUTO-ENTMCNC: 31.8 MG/DL (ref 33–36)
MCV RBC AUTO: 81.5 FL (ref 80–94)
MONOCYTES # BLD AUTO: 0.53 X10(3)/MCL (ref 0.1–1.3)
MONOCYTES NFR BLD AUTO: 9.5 %
NEUTROPHILS # BLD AUTO: 3.96 X10(3)/MCL (ref 2.1–9.2)
NEUTROPHILS NFR BLD AUTO: 70.8 %
NRBC BLD AUTO-RTO: 0 %
PLATELET # BLD AUTO: 275 X10(3)/MCL (ref 130–400)
PMV BLD AUTO: 10.5 FL (ref 7.4–10.4)
POTASSIUM SERPL-SCNC: 4.1 MMOL/L (ref 3.5–5.1)
PROT SERPL-MCNC: 7.6 GM/DL (ref 6.4–8.3)
RBC # BLD AUTO: 4.17 X10(6)/MCL (ref 4.7–6.1)
SODIUM SERPL-SCNC: 140 MMOL/L (ref 136–145)
WBC # SPEC AUTO: 5.6 X10(3)/MCL (ref 4.5–11.5)

## 2023-02-06 PROCEDURE — 86140 C-REACTIVE PROTEIN: CPT | Performed by: INTERNAL MEDICINE

## 2023-02-06 PROCEDURE — 71046 X-RAY EXAM CHEST 2 VIEWS: CPT | Mod: TC

## 2023-02-06 PROCEDURE — 85651 RBC SED RATE NONAUTOMATED: CPT | Performed by: INTERNAL MEDICINE

## 2023-02-06 PROCEDURE — 86923 COMPATIBILITY TEST ELECTRIC: CPT | Mod: 91 | Performed by: THORACIC SURGERY (CARDIOTHORACIC VASCULAR SURGERY)

## 2023-02-06 PROCEDURE — 85025 COMPLETE CBC W/AUTO DIFF WBC: CPT | Performed by: INTERNAL MEDICINE

## 2023-02-06 PROCEDURE — 80053 COMPREHEN METABOLIC PANEL: CPT | Performed by: INTERNAL MEDICINE

## 2023-02-07 LAB — CRP SERPL-MCNC: 32.6 MG/L

## 2023-02-08 ENCOUNTER — HOSPITAL ENCOUNTER (INPATIENT)
Facility: HOSPITAL | Age: 53
LOS: 12 days | Discharge: HOME HEALTH | DRG: 219 | End: 2023-02-20
Attending: THORACIC SURGERY (CARDIOTHORACIC VASCULAR SURGERY) | Admitting: INTERNAL MEDICINE
Payer: MEDICAID

## 2023-02-08 ENCOUNTER — ANESTHESIA (OUTPATIENT)
Dept: SURGERY | Facility: HOSPITAL | Age: 53
DRG: 219 | End: 2023-02-08
Payer: MEDICAID

## 2023-02-08 DIAGNOSIS — I34.0 SEVERE MITRAL VALVE REGURGITATION: ICD-10-CM

## 2023-02-08 DIAGNOSIS — I05.9 ENDOCARDITIS OF MITRAL VALVE: ICD-10-CM

## 2023-02-08 DIAGNOSIS — I33.0 INFECTIVE ENDOCARDITIS OF MITRAL VALVE: Primary | ICD-10-CM

## 2023-02-08 DIAGNOSIS — I33.0 INFECTIVE ENDOCARDITIS OF MITRAL VALVE: ICD-10-CM

## 2023-02-08 DIAGNOSIS — R78.81 BACTEREMIA: ICD-10-CM

## 2023-02-08 DIAGNOSIS — M46.40 DISCITIS: ICD-10-CM

## 2023-02-08 DIAGNOSIS — I25.10 CAD (CORONARY ARTERY DISEASE): ICD-10-CM

## 2023-02-08 LAB
ABO + RH BLD: NORMAL
ANION GAP SERPL CALC-SCNC: 11 MEQ/L
ANION GAP SERPL CALC-SCNC: 7 MEQ/L
APTT PPP: 30.5 SECONDS (ref 23.2–33.7)
APTT PPP: 33.3 SECONDS (ref 23.2–33.7)
BASOPHILS # BLD AUTO: 0.02 X10(3)/MCL (ref 0–0.2)
BASOPHILS # BLD AUTO: 0.03 X10(3)/MCL (ref 0–0.2)
BASOPHILS NFR BLD AUTO: 0.2 %
BASOPHILS NFR BLD AUTO: 0.3 %
BLD PROD TYP BPU: NORMAL
BLOOD UNIT EXPIRATION DATE: NORMAL
BLOOD UNIT TYPE CODE: 5100
BSA FOR ECHO PROCEDURE: 1.51 M2
BUN SERPL-MCNC: 8.4 MG/DL (ref 8.4–25.7)
BUN SERPL-MCNC: 8.6 MG/DL (ref 8.4–25.7)
CALCIUM SERPL-MCNC: 10 MG/DL (ref 8.4–10.2)
CALCIUM SERPL-MCNC: 9.7 MG/DL (ref 8.4–10.2)
CHLORIDE SERPL-SCNC: 111 MMOL/L (ref 98–107)
CHLORIDE SERPL-SCNC: 114 MMOL/L (ref 98–107)
CO2 SERPL-SCNC: 20 MMOL/L (ref 22–29)
CO2 SERPL-SCNC: 20 MMOL/L (ref 22–29)
CORRECTED TEMPERATURE (PCO2): 39 MMHG (ref 35–45)
CORRECTED TEMPERATURE (PCO2): 41 MMHG (ref 35–45)
CORRECTED TEMPERATURE (PH): 7.35 (ref 7.35–7.45)
CORRECTED TEMPERATURE (PH): 7.44 (ref 7.35–7.45)
CORRECTED TEMPERATURE (PO2): 86 MMHG (ref 80–100)
CORRECTED TEMPERATURE (PO2): 99 MMHG (ref 80–100)
CREAT SERPL-MCNC: 0.69 MG/DL (ref 0.73–1.18)
CREAT SERPL-MCNC: 0.79 MG/DL (ref 0.73–1.18)
CREAT/UREA NIT SERPL: 11
CREAT/UREA NIT SERPL: 12
CROSSMATCH INTERPRETATION: NORMAL
DISPENSE STATUS: NORMAL
EOSINOPHIL # BLD AUTO: 0.07 X10(3)/MCL (ref 0–0.9)
EOSINOPHIL # BLD AUTO: 0.09 X10(3)/MCL (ref 0–0.9)
EOSINOPHIL NFR BLD AUTO: 0.6 %
EOSINOPHIL NFR BLD AUTO: 1.4 %
ERYTHROCYTE [DISTWIDTH] IN BLOOD BY AUTOMATED COUNT: 15.2 % (ref 11.5–17)
ERYTHROCYTE [DISTWIDTH] IN BLOOD BY AUTOMATED COUNT: 15.3 % (ref 11.5–17)
GFR SERPLBLD CREATININE-BSD FMLA CKD-EPI: >60 MLS/MIN/1.73/M2
GFR SERPLBLD CREATININE-BSD FMLA CKD-EPI: >60 MLS/MIN/1.73/M2
GLUCOSE SERPL-MCNC: 129 MG/DL (ref 74–100)
GLUCOSE SERPL-MCNC: 187 MG/DL (ref 74–100)
HCO3 UR-SCNC: 22.6 MMOL/L (ref 22–26)
HCO3 UR-SCNC: 26.5 MMOL/L (ref 22–26)
HCT VFR BLD AUTO: 24.7 % (ref 42–52)
HCT VFR BLD AUTO: 29.8 % (ref 42–52)
HCT VFR BLD AUTO: 30.9 % (ref 42–52)
HGB BLD-MCNC: 10 GM/DL (ref 14–18)
HGB BLD-MCNC: 11 G/DL (ref 12–16)
HGB BLD-MCNC: 11 G/DL (ref 12–16)
HGB BLD-MCNC: 7.8 GM/DL (ref 14–18)
HGB BLD-MCNC: 9.8 GM/DL (ref 14–18)
IMM GRANULOCYTES # BLD AUTO: 0.03 X10(3)/MCL (ref 0–0.04)
IMM GRANULOCYTES # BLD AUTO: 0.1 X10(3)/MCL (ref 0–0.04)
IMM GRANULOCYTES NFR BLD AUTO: 0.5 %
IMM GRANULOCYTES NFR BLD AUTO: 0.8 %
INR BLD: 1.44 (ref 0–1.3)
INR BLD: 1.7 (ref 0–1.3)
LYMPHOCYTES # BLD AUTO: 0.58 X10(3)/MCL (ref 0.6–4.6)
LYMPHOCYTES # BLD AUTO: 1.55 X10(3)/MCL (ref 0.6–4.6)
LYMPHOCYTES NFR BLD AUTO: 12.3 %
LYMPHOCYTES NFR BLD AUTO: 8.9 %
MAGNESIUM SERPL-MCNC: 2.4 MG/DL (ref 1.6–2.6)
MCH RBC QN AUTO: 26.4 PG
MCH RBC QN AUTO: 26.6 PG
MCHC RBC AUTO-ENTMCNC: 31.6 MG/DL (ref 33–36)
MCHC RBC AUTO-ENTMCNC: 32.9 MG/DL (ref 33–36)
MCV RBC AUTO: 81 FL (ref 80–94)
MCV RBC AUTO: 83.7 FL (ref 80–94)
MONOCYTES # BLD AUTO: 0.38 X10(3)/MCL (ref 0.1–1.3)
MONOCYTES # BLD AUTO: 1.36 X10(3)/MCL (ref 0.1–1.3)
MONOCYTES NFR BLD AUTO: 10.8 %
MONOCYTES NFR BLD AUTO: 5.8 %
NEUTROPHILS # BLD AUTO: 5.45 X10(3)/MCL (ref 2.1–9.2)
NEUTROPHILS # BLD AUTO: 9.53 X10(3)/MCL (ref 2.1–9.2)
NEUTROPHILS NFR BLD AUTO: 75.3 %
NEUTROPHILS NFR BLD AUTO: 83.1 %
NRBC BLD AUTO-RTO: 0 %
NRBC BLD AUTO-RTO: 0 %
PCO2 BLDA: 39 MMHG (ref 35–45)
PCO2 BLDA: 41 MMHG (ref 35–45)
PCO2 BLDA: 44 MMHG
PH SMN: 7.33 [PH] (ref 7.35–7.45)
PH SMN: 7.35 [PH] (ref 7.35–7.45)
PH SMN: 7.44 [PH] (ref 7.35–7.45)
PHOSPHATE SERPL-MCNC: 3.6 MG/DL (ref 2.3–4.7)
PLATELET # BLD AUTO: 190 X10(3)/MCL (ref 130–400)
PLATELET # BLD AUTO: 276 X10(3)/MCL (ref 130–400)
PMV BLD AUTO: 10.1 FL (ref 7.4–10.4)
PMV BLD AUTO: 9.7 FL (ref 7.4–10.4)
PO2 BLDA: 182 MMHG
PO2 BLDA: 86 MMHG (ref 80–100)
PO2 BLDA: 99 MMHG (ref 80–100)
POC BASE DEFICIT: -2.8 MMOL/L
POC BASE DEFICIT: -2.9 MMOL/L (ref -2–2)
POC BASE DEFICIT: 2.2 MMOL/L (ref -2–2)
POC COHB: 1.6 %
POC COHB: 1.7 %
POC HCO3: 23.2 MMOL/L
POC IONIZED CALCIUM: 1.17 MMOL/L (ref 1.12–1.23)
POC IONIZED CALCIUM: 1.32 MMOL/L (ref 1.12–1.23)
POC IONIZED CALCIUM: 1.4 MMOL/L (ref 1.12–1.23)
POC METHB: 0.7 % (ref 0.4–1.5)
POC METHB: 1.4 % (ref 0.4–1.5)
POC O2HB: 95.4 % (ref 94–97)
POC O2HB: 96.3 % (ref 94–97)
POC SATURATED O2: 100 %
POC SATURATED O2: 96.9 %
POC SATURATED O2: 97.3 %
POC TEMPERATURE: 37 C
POC TEMPERATURE: 37 °C
POC TEMPERATURE: 37 °C
POCT GLUCOSE: 134 MG/DL (ref 70–110)
POCT GLUCOSE: 134 MG/DL (ref 70–110)
POCT GLUCOSE: 139 MG/DL (ref 70–110)
POCT GLUCOSE: 142 MG/DL (ref 70–110)
POCT GLUCOSE: 146 MG/DL (ref 70–110)
POCT GLUCOSE: 171 MG/DL (ref 70–110)
POCT GLUCOSE: 181 MG/DL (ref 70–110)
POCT GLUCOSE: 183 MG/DL (ref 70–110)
POCT GLUCOSE: 92 MG/DL (ref 70–110)
POTASSIUM BLD-SCNC: 3.1 MMOL/L
POTASSIUM BLD-SCNC: 3.3 MMOL/L (ref 3.5–5)
POTASSIUM BLD-SCNC: 3.5 MMOL/L (ref 3.5–5)
POTASSIUM SERPL-SCNC: 3.6 MMOL/L (ref 3.5–5.1)
POTASSIUM SERPL-SCNC: 3.9 MMOL/L (ref 3.5–5.1)
POTASSIUM SERPL-SCNC: 4.1 MMOL/L (ref 3.5–5.1)
PROTHROMBIN TIME: 17.4 SECONDS (ref 12.5–14.5)
PROTHROMBIN TIME: 19.7 SECONDS (ref 12.5–14.5)
RBC # BLD AUTO: 2.95 X10(6)/MCL (ref 4.7–6.1)
RBC # BLD AUTO: 3.68 X10(6)/MCL (ref 4.7–6.1)
SODIUM BLD-SCNC: 138 MMOL/L (ref 137–145)
SODIUM BLD-SCNC: 138 MMOL/L (ref 137–145)
SODIUM BLD-SCNC: 140 MMOL/L
SODIUM SERPL-SCNC: 141 MMOL/L (ref 136–145)
SODIUM SERPL-SCNC: 142 MMOL/L (ref 136–145)
SPECIMEN SOURCE: ABNORMAL
UNIT NUMBER: NORMAL
WBC # SPEC AUTO: 12.6 X10(3)/MCL (ref 4.5–11.5)
WBC # SPEC AUTO: 6.6 X10(3)/MCL (ref 4.5–11.5)

## 2023-02-08 PROCEDURE — 37799 UNLISTED PX VASCULAR SURGERY: CPT

## 2023-02-08 PROCEDURE — 25000242 PHARM REV CODE 250 ALT 637 W/ HCPCS

## 2023-02-08 PROCEDURE — 25000003 PHARM REV CODE 250

## 2023-02-08 PROCEDURE — 20000000 HC ICU ROOM

## 2023-02-08 PROCEDURE — 36620 INSERTION CATHETER ARTERY: CPT

## 2023-02-08 PROCEDURE — 82803 BLOOD GASES ANY COMBINATION: CPT

## 2023-02-08 PROCEDURE — 87040 BLOOD CULTURE FOR BACTERIA: CPT | Performed by: THORACIC SURGERY (CARDIOTHORACIC VASCULAR SURGERY)

## 2023-02-08 PROCEDURE — 85730 THROMBOPLASTIN TIME PARTIAL: CPT | Performed by: PHYSICIAN ASSISTANT

## 2023-02-08 PROCEDURE — 83735 ASSAY OF MAGNESIUM: CPT | Performed by: PHYSICIAN ASSISTANT

## 2023-02-08 PROCEDURE — 27200966 HC CLOSED SUCTION SYSTEM

## 2023-02-08 PROCEDURE — 94761 N-INVAS EAR/PLS OXIMETRY MLT: CPT

## 2023-02-08 PROCEDURE — 27201423 OPTIME MED/SURG SUP & DEVICES STERILE SUPPLY: Performed by: THORACIC SURGERY (CARDIOTHORACIC VASCULAR SURGERY)

## 2023-02-08 PROCEDURE — 85610 PROTHROMBIN TIME: CPT | Performed by: THORACIC SURGERY (CARDIOTHORACIC VASCULAR SURGERY)

## 2023-02-08 PROCEDURE — 33430 REPLACEMENT OF MITRAL VALVE: CPT | Mod: AS,,, | Performed by: PHYSICIAN ASSISTANT

## 2023-02-08 PROCEDURE — 63600175 PHARM REV CODE 636 W HCPCS: Mod: JG

## 2023-02-08 PROCEDURE — 37000008 HC ANESTHESIA 1ST 15 MINUTES: Performed by: THORACIC SURGERY (CARDIOTHORACIC VASCULAR SURGERY)

## 2023-02-08 PROCEDURE — P9035 PLATELET PHERES LEUKOREDUCED: HCPCS | Performed by: THORACIC SURGERY (CARDIOTHORACIC VASCULAR SURGERY)

## 2023-02-08 PROCEDURE — 33430 PR REPLACEMENT OF MITRAL VALVE: ICD-10-PCS | Mod: AS,,, | Performed by: PHYSICIAN ASSISTANT

## 2023-02-08 PROCEDURE — C1894 INTRO/SHEATH, NON-LASER: HCPCS | Performed by: THORACIC SURGERY (CARDIOTHORACIC VASCULAR SURGERY)

## 2023-02-08 PROCEDURE — 99900035 HC TECH TIME PER 15 MIN (STAT)

## 2023-02-08 PROCEDURE — 85610 PROTHROMBIN TIME: CPT | Performed by: PHYSICIAN ASSISTANT

## 2023-02-08 PROCEDURE — 25000003 PHARM REV CODE 250: Performed by: PHYSICIAN ASSISTANT

## 2023-02-08 PROCEDURE — 33430 REPLACEMENT OF MITRAL VALVE: CPT | Mod: ,,, | Performed by: THORACIC SURGERY (CARDIOTHORACIC VASCULAR SURGERY)

## 2023-02-08 PROCEDURE — C9248 INJ, CLEVIDIPINE BUTYRATE: HCPCS | Mod: JG

## 2023-02-08 PROCEDURE — 63600175 PHARM REV CODE 636 W HCPCS: Performed by: THORACIC SURGERY (CARDIOTHORACIC VASCULAR SURGERY)

## 2023-02-08 PROCEDURE — 36000713 HC OR TIME LEV V EA ADD 15 MIN: Performed by: THORACIC SURGERY (CARDIOTHORACIC VASCULAR SURGERY)

## 2023-02-08 PROCEDURE — 27000221 HC OXYGEN, UP TO 24 HOURS

## 2023-02-08 PROCEDURE — 87070 CULTURE OTHR SPECIMN AEROBIC: CPT | Performed by: THORACIC SURGERY (CARDIOTHORACIC VASCULAR SURGERY)

## 2023-02-08 PROCEDURE — C1751 CATH, INF, PER/CENT/MIDLINE: HCPCS

## 2023-02-08 PROCEDURE — 82962 GLUCOSE BLOOD TEST: CPT | Performed by: THORACIC SURGERY (CARDIOTHORACIC VASCULAR SURGERY)

## 2023-02-08 PROCEDURE — 85014 HEMATOCRIT: CPT | Performed by: THORACIC SURGERY (CARDIOTHORACIC VASCULAR SURGERY)

## 2023-02-08 PROCEDURE — 63600175 PHARM REV CODE 636 W HCPCS: Performed by: PHYSICIAN ASSISTANT

## 2023-02-08 PROCEDURE — 87075 CULTR BACTERIA EXCEPT BLOOD: CPT | Performed by: THORACIC SURGERY (CARDIOTHORACIC VASCULAR SURGERY)

## 2023-02-08 PROCEDURE — S5010 5% DEXTROSE AND 0.45% SALINE: HCPCS | Performed by: PHYSICIAN ASSISTANT

## 2023-02-08 PROCEDURE — 80048 BASIC METABOLIC PNL TOTAL CA: CPT | Performed by: PHYSICIAN ASSISTANT

## 2023-02-08 PROCEDURE — 85730 THROMBOPLASTIN TIME PARTIAL: CPT | Performed by: THORACIC SURGERY (CARDIOTHORACIC VASCULAR SURGERY)

## 2023-02-08 PROCEDURE — 33430 PR REPLACEMENT OF MITRAL VALVE: ICD-10-PCS | Mod: ,,, | Performed by: THORACIC SURGERY (CARDIOTHORACIC VASCULAR SURGERY)

## 2023-02-08 PROCEDURE — 37000009 HC ANESTHESIA EA ADD 15 MINS: Performed by: THORACIC SURGERY (CARDIOTHORACIC VASCULAR SURGERY)

## 2023-02-08 PROCEDURE — C1768 GRAFT, VASCULAR: HCPCS | Performed by: THORACIC SURGERY (CARDIOTHORACIC VASCULAR SURGERY)

## 2023-02-08 PROCEDURE — 84132 ASSAY OF SERUM POTASSIUM: CPT | Performed by: THORACIC SURGERY (CARDIOTHORACIC VASCULAR SURGERY)

## 2023-02-08 PROCEDURE — 84100 ASSAY OF PHOSPHORUS: CPT | Performed by: PHYSICIAN ASSISTANT

## 2023-02-08 PROCEDURE — 94002 VENT MGMT INPAT INIT DAY: CPT

## 2023-02-08 PROCEDURE — C1713 ANCHOR/SCREW BN/BN,TIS/BN: HCPCS | Performed by: THORACIC SURGERY (CARDIOTHORACIC VASCULAR SURGERY)

## 2023-02-08 PROCEDURE — 85025 COMPLETE CBC W/AUTO DIFF WBC: CPT | Performed by: PHYSICIAN ASSISTANT

## 2023-02-08 PROCEDURE — C1729 CATH, DRAINAGE: HCPCS | Performed by: THORACIC SURGERY (CARDIOTHORACIC VASCULAR SURGERY)

## 2023-02-08 PROCEDURE — 80048 BASIC METABOLIC PNL TOTAL CA: CPT | Performed by: THORACIC SURGERY (CARDIOTHORACIC VASCULAR SURGERY)

## 2023-02-08 PROCEDURE — 36000712 HC OR TIME LEV V 1ST 15 MIN: Performed by: THORACIC SURGERY (CARDIOTHORACIC VASCULAR SURGERY)

## 2023-02-08 PROCEDURE — 85025 COMPLETE CBC W/AUTO DIFF WBC: CPT | Performed by: THORACIC SURGERY (CARDIOTHORACIC VASCULAR SURGERY)

## 2023-02-08 PROCEDURE — 63600175 PHARM REV CODE 636 W HCPCS

## 2023-02-08 PROCEDURE — 27200667 HC PACEMAKER, TEMPORARY MONITORING, PER SHIFT

## 2023-02-08 DEVICE — PLATE MANUBRIUM LOW PROFILE: Type: IMPLANTABLE DEVICE | Site: STERNUM | Status: FUNCTIONAL

## 2023-02-08 DEVICE — SCREW SD LOCKING 2.3X14MM: Type: IMPLANTABLE DEVICE | Site: STERNUM | Status: FUNCTIONAL

## 2023-02-08 DEVICE — SCREW SD LOCKING 2.3X18MM: Type: IMPLANTABLE DEVICE | Site: STERNUM | Status: FUNCTIONAL

## 2023-02-08 DEVICE — SCREW SD LOCKING 2.3X12MM: Type: IMPLANTABLE DEVICE | Site: STERNUM | Status: FUNCTIONAL

## 2023-02-08 DEVICE — PLATE LOW PROFILE X-PLATE: Type: IMPLANTABLE DEVICE | Site: STERNUM | Status: FUNCTIONAL

## 2023-02-08 RX ORDER — LOPERAMIDE HYDROCHLORIDE 2 MG/1
2 CAPSULE ORAL CONTINUOUS PRN
Status: DISCONTINUED | OUTPATIENT
Start: 2023-02-08 | End: 2023-02-20 | Stop reason: HOSPADM

## 2023-02-08 RX ORDER — LACTULOSE 10 G/15ML
20 SOLUTION ORAL EVERY 6 HOURS PRN
Status: DISCONTINUED | OUTPATIENT
Start: 2023-02-08 | End: 2023-02-20 | Stop reason: HOSPADM

## 2023-02-08 RX ORDER — MUPIROCIN 20 MG/G
OINTMENT TOPICAL 2 TIMES DAILY
Status: COMPLETED | OUTPATIENT
Start: 2023-02-08 | End: 2023-02-12

## 2023-02-08 RX ORDER — ACETAMINOPHEN 10 MG/ML
1000 INJECTION, SOLUTION INTRAVENOUS ONCE
Status: COMPLETED | OUTPATIENT
Start: 2023-02-08 | End: 2023-02-08

## 2023-02-08 RX ORDER — ROCURONIUM BROMIDE 10 MG/ML
INJECTION, SOLUTION INTRAVENOUS
Status: DISCONTINUED | OUTPATIENT
Start: 2023-02-08 | End: 2023-02-08

## 2023-02-08 RX ORDER — CALCIUM GLUCONATE 20 MG/ML
1 INJECTION, SOLUTION INTRAVENOUS
Status: DISCONTINUED | OUTPATIENT
Start: 2023-02-08 | End: 2023-02-20 | Stop reason: HOSPADM

## 2023-02-08 RX ORDER — DOCUSATE SODIUM 100 MG/1
100 CAPSULE, LIQUID FILLED ORAL 2 TIMES DAILY
Status: DISCONTINUED | OUTPATIENT
Start: 2023-02-09 | End: 2023-02-20 | Stop reason: HOSPADM

## 2023-02-08 RX ORDER — CALCIUM CHLORIDE INJECTION 100 MG/ML
INJECTION, SOLUTION INTRAVENOUS
Status: DISCONTINUED | OUTPATIENT
Start: 2023-02-08 | End: 2023-02-08

## 2023-02-08 RX ORDER — METOCLOPRAMIDE HYDROCHLORIDE 5 MG/ML
5 INJECTION INTRAMUSCULAR; INTRAVENOUS EVERY 6 HOURS PRN
Status: DISCONTINUED | OUTPATIENT
Start: 2023-02-08 | End: 2023-02-20 | Stop reason: HOSPADM

## 2023-02-08 RX ORDER — ESMOLOL HYDROCHLORIDE 10 MG/ML
INJECTION INTRAVENOUS
Status: DISCONTINUED | OUTPATIENT
Start: 2023-02-08 | End: 2023-02-08

## 2023-02-08 RX ORDER — CALCIUM GLUCONATE 20 MG/ML
2 INJECTION, SOLUTION INTRAVENOUS
Status: DISCONTINUED | OUTPATIENT
Start: 2023-02-08 | End: 2023-02-20 | Stop reason: HOSPADM

## 2023-02-08 RX ORDER — HYDROCODONE BITARTRATE AND ACETAMINOPHEN 500; 5 MG/1; MG/1
TABLET ORAL
Status: DISCONTINUED | OUTPATIENT
Start: 2023-02-08 | End: 2023-02-20 | Stop reason: HOSPADM

## 2023-02-08 RX ORDER — POTASSIUM CHLORIDE 29.8 MG/ML
40 INJECTION INTRAVENOUS
Status: DISCONTINUED | OUTPATIENT
Start: 2023-02-08 | End: 2023-02-20 | Stop reason: HOSPADM

## 2023-02-08 RX ORDER — FENTANYL CITRATE 50 UG/ML
INJECTION, SOLUTION INTRAMUSCULAR; INTRAVENOUS
Status: DISCONTINUED | OUTPATIENT
Start: 2023-02-08 | End: 2023-02-08

## 2023-02-08 RX ORDER — HEPARIN SODIUM 1000 [USP'U]/ML
INJECTION, SOLUTION INTRAVENOUS; SUBCUTANEOUS
Status: DISCONTINUED | OUTPATIENT
Start: 2023-02-08 | End: 2023-02-08

## 2023-02-08 RX ORDER — PHENYLEPHRINE HYDROCHLORIDE 10 MG/ML
INJECTION INTRAVENOUS
Status: DISCONTINUED | OUTPATIENT
Start: 2023-02-08 | End: 2023-02-08

## 2023-02-08 RX ORDER — MIDAZOLAM HYDROCHLORIDE 1 MG/ML
INJECTION INTRAMUSCULAR; INTRAVENOUS
Status: DISCONTINUED | OUTPATIENT
Start: 2023-02-08 | End: 2023-02-08

## 2023-02-08 RX ORDER — ASPIRIN 81 MG/1
81 TABLET ORAL DAILY
Status: DISCONTINUED | OUTPATIENT
Start: 2023-02-09 | End: 2023-02-20 | Stop reason: HOSPADM

## 2023-02-08 RX ORDER — POTASSIUM CHLORIDE 14.9 MG/ML
60 INJECTION INTRAVENOUS
Status: DISCONTINUED | OUTPATIENT
Start: 2023-02-08 | End: 2023-02-20 | Stop reason: HOSPADM

## 2023-02-08 RX ORDER — FOLIC ACID 1 MG/1
1 TABLET ORAL DAILY
Status: DISCONTINUED | OUTPATIENT
Start: 2023-02-09 | End: 2023-02-20 | Stop reason: HOSPADM

## 2023-02-08 RX ORDER — DEXMEDETOMIDINE HYDROCHLORIDE 4 UG/ML
0-1.4 INJECTION, SOLUTION INTRAVENOUS CONTINUOUS
Status: DISCONTINUED | OUTPATIENT
Start: 2023-02-08 | End: 2023-02-18

## 2023-02-08 RX ORDER — MORPHINE SULFATE 10 MG/ML
4 INJECTION INTRAMUSCULAR; INTRAVENOUS; SUBCUTANEOUS EVERY 4 HOURS PRN
Status: DISCONTINUED | OUTPATIENT
Start: 2023-02-08 | End: 2023-02-20 | Stop reason: HOSPADM

## 2023-02-08 RX ORDER — DESMOPRESSIN ACETATE 4 UG/ML
INJECTION, SOLUTION INTRAVENOUS; SUBCUTANEOUS
Status: DISCONTINUED | OUTPATIENT
Start: 2023-02-08 | End: 2023-02-08

## 2023-02-08 RX ORDER — PROTAMINE SULFATE 10 MG/ML
INJECTION, SOLUTION INTRAVENOUS
Status: DISCONTINUED | OUTPATIENT
Start: 2023-02-08 | End: 2023-02-08

## 2023-02-08 RX ORDER — CEFTRIAXONE 2 G/50ML
2 INJECTION, SOLUTION INTRAVENOUS
Status: DISCONTINUED | OUTPATIENT
Start: 2023-02-08 | End: 2023-02-20 | Stop reason: HOSPADM

## 2023-02-08 RX ORDER — CALCIUM GLUCONATE 20 MG/ML
3 INJECTION, SOLUTION INTRAVENOUS
Status: DISCONTINUED | OUTPATIENT
Start: 2023-02-08 | End: 2023-02-20 | Stop reason: HOSPADM

## 2023-02-08 RX ORDER — AMLODIPINE BESYLATE 5 MG/1
5 TABLET ORAL DAILY
Status: DISCONTINUED | OUTPATIENT
Start: 2023-02-08 | End: 2023-02-20 | Stop reason: HOSPADM

## 2023-02-08 RX ORDER — LIDOCAINE HYDROCHLORIDE 20 MG/ML
INJECTION, SOLUTION EPIDURAL; INFILTRATION; INTRACAUDAL; PERINEURAL
Status: DISCONTINUED | OUTPATIENT
Start: 2023-02-08 | End: 2023-02-08

## 2023-02-08 RX ORDER — ENOXAPARIN SODIUM 100 MG/ML
40 INJECTION SUBCUTANEOUS EVERY 24 HOURS
Status: DISCONTINUED | OUTPATIENT
Start: 2023-02-09 | End: 2023-02-14

## 2023-02-08 RX ORDER — OXYCODONE HYDROCHLORIDE 5 MG/1
10 TABLET ORAL EVERY 4 HOURS PRN
Status: DISCONTINUED | OUTPATIENT
Start: 2023-02-08 | End: 2023-02-20 | Stop reason: HOSPADM

## 2023-02-08 RX ORDER — POTASSIUM CHLORIDE 14.9 MG/ML
20 INJECTION INTRAVENOUS
Status: DISCONTINUED | OUTPATIENT
Start: 2023-02-08 | End: 2023-02-20 | Stop reason: HOSPADM

## 2023-02-08 RX ORDER — PROPOFOL 10 MG/ML
VIAL (ML) INTRAVENOUS
Status: DISCONTINUED | OUTPATIENT
Start: 2023-02-08 | End: 2023-02-08

## 2023-02-08 RX ORDER — METOPROLOL TARTRATE 25 MG/1
12.5 TABLET ORAL 2 TIMES DAILY
Status: DISCONTINUED | OUTPATIENT
Start: 2023-02-09 | End: 2023-02-20 | Stop reason: HOSPADM

## 2023-02-08 RX ORDER — ALBUMIN HUMAN 50 G/1000ML
12.5 SOLUTION INTRAVENOUS
Status: DISCONTINUED | OUTPATIENT
Start: 2023-02-08 | End: 2023-02-20 | Stop reason: HOSPADM

## 2023-02-08 RX ORDER — MUPIROCIN 20 MG/G
OINTMENT TOPICAL
Status: DISPENSED | OUTPATIENT
Start: 2023-02-08 | End: 2023-02-08

## 2023-02-08 RX ORDER — ALBUTEROL SULFATE 90 UG/1
AEROSOL, METERED RESPIRATORY (INHALATION)
Status: DISCONTINUED | OUTPATIENT
Start: 2023-02-08 | End: 2023-02-08

## 2023-02-08 RX ORDER — ONDANSETRON 2 MG/ML
4 INJECTION INTRAMUSCULAR; INTRAVENOUS EVERY 4 HOURS PRN
Status: DISCONTINUED | OUTPATIENT
Start: 2023-02-08 | End: 2023-02-20 | Stop reason: HOSPADM

## 2023-02-08 RX ORDER — HYDROCODONE BITARTRATE AND ACETAMINOPHEN 5; 325 MG/1; MG/1
1 TABLET ORAL EVERY 4 HOURS PRN
Status: DISCONTINUED | OUTPATIENT
Start: 2023-02-08 | End: 2023-02-20 | Stop reason: HOSPADM

## 2023-02-08 RX ORDER — MAGNESIUM SULFATE HEPTAHYDRATE 40 MG/ML
2 INJECTION, SOLUTION INTRAVENOUS
Status: DISCONTINUED | OUTPATIENT
Start: 2023-02-08 | End: 2023-02-20 | Stop reason: HOSPADM

## 2023-02-08 RX ORDER — MAGNESIUM SULFATE HEPTAHYDRATE 40 MG/ML
4 INJECTION, SOLUTION INTRAVENOUS
Status: DISCONTINUED | OUTPATIENT
Start: 2023-02-08 | End: 2023-02-20 | Stop reason: HOSPADM

## 2023-02-08 RX ORDER — DEXTROSE MONOHYDRATE AND SODIUM CHLORIDE 5; .45 G/100ML; G/100ML
INJECTION, SOLUTION INTRAVENOUS CONTINUOUS
Status: DISCONTINUED | OUTPATIENT
Start: 2023-02-08 | End: 2023-02-18

## 2023-02-08 RX ORDER — SUCRALFATE 1 G/1
1 TABLET ORAL
Status: DISCONTINUED | OUTPATIENT
Start: 2023-02-09 | End: 2023-02-10

## 2023-02-08 RX ORDER — TRANEXAMIC ACID 100 MG/ML
INJECTION, SOLUTION INTRAVENOUS
Status: DISCONTINUED | OUTPATIENT
Start: 2023-02-08 | End: 2023-02-08

## 2023-02-08 RX ADMIN — ACETAMINOPHEN 1000 MG: 10 INJECTION, SOLUTION INTRAVENOUS at 01:02

## 2023-02-08 RX ADMIN — CLEVIPIDINE 2 MG/HR: 0.5 EMULSION INTRAVENOUS at 12:02

## 2023-02-08 RX ADMIN — PHENYLEPHRINE HYDROCHLORIDE 100 MCG: 10 INJECTION INTRAVENOUS at 10:02

## 2023-02-08 RX ADMIN — DEXTROSE AND SODIUM CHLORIDE: 5; 450 INJECTION, SOLUTION INTRAVENOUS at 11:02

## 2023-02-08 RX ADMIN — HYDROCODONE BITARTRATE AND ACETAMINOPHEN 1 TABLET: 5; 325 TABLET ORAL at 06:02

## 2023-02-08 RX ADMIN — FENTANYL CITRATE 150 MCG: 50 INJECTION, SOLUTION INTRAMUSCULAR; INTRAVENOUS at 07:02

## 2023-02-08 RX ADMIN — INSULIN HUMAN 1 UNITS/HR: 1 INJECTION, SOLUTION INTRAVENOUS at 11:02

## 2023-02-08 RX ADMIN — PROTAMINE SULFATE 50 MG: 10 INJECTION, SOLUTION INTRAVENOUS at 09:02

## 2023-02-08 RX ADMIN — PROPOFOL 150 MG: 10 INJECTION, EMULSION INTRAVENOUS at 07:02

## 2023-02-08 RX ADMIN — ROCURONIUM BROMIDE 80 MG: 10 INJECTION, SOLUTION INTRAVENOUS at 07:02

## 2023-02-08 RX ADMIN — CEFTRIAXONE SODIUM 2 G: 2 INJECTION, POWDER, FOR SOLUTION INTRAMUSCULAR; INTRAVENOUS at 02:02

## 2023-02-08 RX ADMIN — SODIUM CHLORIDE: 0.9 INJECTION, SOLUTION INTRAVENOUS at 06:02

## 2023-02-08 RX ADMIN — PHENYLEPHRINE HYDROCHLORIDE 100 MCG: 10 INJECTION INTRAVENOUS at 08:02

## 2023-02-08 RX ADMIN — MUPIROCIN: 20 OINTMENT TOPICAL at 09:02

## 2023-02-08 RX ADMIN — ALBUTEROL SULFATE 6 PUFF: 90 AEROSOL, METERED RESPIRATORY (INHALATION) at 09:02

## 2023-02-08 RX ADMIN — CALCIUM CHLORIDE INJECTION 0.5 G: 100 INJECTION, SOLUTION INTRAVENOUS at 09:02

## 2023-02-08 RX ADMIN — PROTAMINE SULFATE 300 MG: 10 INJECTION, SOLUTION INTRAVENOUS at 09:02

## 2023-02-08 RX ADMIN — POTASSIUM CHLORIDE 20 MEQ: 14.9 INJECTION, SOLUTION INTRAVENOUS at 07:02

## 2023-02-08 RX ADMIN — TRANEXAMIC ACID 550 MG: 100 INJECTION, SOLUTION INTRAVENOUS at 07:02

## 2023-02-08 RX ADMIN — MUPIROCIN: 20 OINTMENT TOPICAL at 11:02

## 2023-02-08 RX ADMIN — OXYCODONE 10 MG: 5 TABLET ORAL at 07:02

## 2023-02-08 RX ADMIN — EPINEPHRINE 0.03 MCG/KG/MIN: 1 INJECTION INTRAMUSCULAR; INTRAVENOUS; SUBCUTANEOUS at 09:02

## 2023-02-08 RX ADMIN — ESMOLOL HYDROCHLORIDE 10 MG: 100 INJECTION, SOLUTION INTRAVENOUS at 06:02

## 2023-02-08 RX ADMIN — HEPARIN SODIUM 20000 UNITS: 1000 INJECTION, SOLUTION INTRAVENOUS; SUBCUTANEOUS at 07:02

## 2023-02-08 RX ADMIN — LIDOCAINE HYDROCHLORIDE 50 MG: 20 INJECTION, SOLUTION EPIDURAL; INFILTRATION; INTRACAUDAL; PERINEURAL at 07:02

## 2023-02-08 RX ADMIN — DEXTROSE MONOHYDRATE 1.5 G: 50 INJECTION, SOLUTION INTRAVENOUS at 07:02

## 2023-02-08 RX ADMIN — FENTANYL CITRATE 100 MCG: 50 INJECTION, SOLUTION INTRAMUSCULAR; INTRAVENOUS at 09:02

## 2023-02-08 RX ADMIN — ONDANSETRON 4 MG: 2 INJECTION INTRAMUSCULAR; INTRAVENOUS at 04:02

## 2023-02-08 RX ADMIN — PHENYLEPHRINE HYDROCHLORIDE 100 MCG: 10 INJECTION INTRAVENOUS at 07:02

## 2023-02-08 RX ADMIN — FENTANYL CITRATE 250 MCG: 50 INJECTION, SOLUTION INTRAMUSCULAR; INTRAVENOUS at 07:02

## 2023-02-08 RX ADMIN — MIDAZOLAM HYDROCHLORIDE 5 MG: 1 INJECTION, SOLUTION INTRAMUSCULAR; INTRAVENOUS at 06:02

## 2023-02-08 RX ADMIN — SODIUM CHLORIDE 1 UNITS/HR: 9 INJECTION, SOLUTION INTRAVENOUS at 09:02

## 2023-02-08 RX ADMIN — DEXMEDETOMIDINE HYDROCHLORIDE 0.8 MCG/KG/HR: 400 INJECTION INTRAVENOUS at 09:02

## 2023-02-08 RX ADMIN — ROCURONIUM BROMIDE 20 MG: 10 INJECTION, SOLUTION INTRAVENOUS at 07:02

## 2023-02-08 RX ADMIN — DESMOPRESSIN ACETATE 24 MCG: 4 SOLUTION INTRAVENOUS at 10:02

## 2023-02-08 RX ADMIN — METOCLOPRAMIDE 5 MG: 5 INJECTION, SOLUTION INTRAMUSCULAR; INTRAVENOUS at 05:02

## 2023-02-08 NOTE — OP NOTE
[facility]  Cardiothoracic Surgery  Operative Note    SUMMARY     Date of Procedure: 2/8/2023     Procedure:  1.  High-risk mitral valve replacement with a number 31 Medtronic ats mechanical prosthesis   2. Left atrial appendage ligation with a number 40 AtriClip device        Surgeon: FARHAD Rodrigues     Assistant: Speedy Almonte    Pre-Operative Diagnosis:  1.  Subacute bacterial endocarditis  2.  Severe mitral regurgitation   3. History of diskitis    Post-Operative Diagnosis:  Same    Anesthesia: General    Operative Findings (including complications, if any):  Severely diseased anterior and posterior mitral valve leaflets with diseased chordae and large amounts of granulation tissue.    Description of Technical Procedures: Patient was delivered to the operating room, support lines were placed, general endotracheal anesthesia was induced.  Patient was prepped and draped in usual sterile fashion.   Median sternotomy was made Heparin was administered, once the ACT was satisfactory the patient was cannulated via the ascending aorta and r bicaval cannulation technique and placed on cardiopulmonary bypass.  Aortic cross-clamp was applied and cardioplegia was given down the aortic root to promote diastolic arrest.  Topical cold saline was also used to promote hypothermia.  The mitral valve was exposed through [place]ston groove and the valve leaflets were found to be severely diseased with large amounts of granulation tissue.  This necessitated excision of all of the mitral valve leaflets and chordal structures.  Interrupted horizontal mattress sutures of 2-0 Ethibond were placed in the mitral valve annulus and then through the sewing cuff of a 31 mm ats mechanical prosthesis.  The valve was lowered in the place and with the aid of the cor knot device the sutures were tied.  Left atriotomy was closed with 2 layers of 4-0 Prolene in the usual fashion.  The heart was appropriately  de-aired.  It should be noted that CO2 insufflation was used in the pericardial well throughout the entire case.  The patient was weaned and discontinued from cardiopulmonary bypass.  Right atrial and ventricular epicardial pacing wires were placed as were 2-24 Lithuanian Ruben drains, 1 in the left chest and 1 in the mediastinum.  Protamine was administered the patient was decannulated.  Sternum was closed with stainless steel wire in the Splice Machine sternal plating system.  Subcutaneous tissues were closed in layers with Vicryl.  Patient tolerated the procedure well will be delivered to the recovery room in stable condition.     Estimated Blood Loss (EBL): N/A          Specimens:   Specimen (24h ago, onward)       Start     Ordered    02/08/23 0934  Specimen to Pathology  RELEASE UPON ORDERING        References:    Click here for ordering Quick Tip   Question:  Release to patient  Answer:  Immediate    02/08/23 0934                            Condition: Stable    Disposition: ICU - intubated and hemodynamically stable.

## 2023-02-08 NOTE — ANESTHESIA PROCEDURE NOTES
Intubation    Date/Time: 2/8/2023 7:12 AM  Performed by: Tori Sanchez CRNA  Authorized by: Madina Zavala MD     Intubation:     Induction:  Intravenous    Intubated:  Postinduction    Mask Ventilation:  Easy mask    Attempts:  2    Attempted By:  CRNA    Method of Intubation:  Direct    Blade:  Urrutia 2    Laryngeal View Grade: Grade III - only epiglottis visible      Attempted By (2nd Attempt):  CRNA    Method of Intubation (2nd Attempt):  Direct    Blade (2nd Attempt):  Ramirez 3    Laryngeal View Grade (2nd Attempt): Grade IIa - cords partially seen      Difficult Airway Encountered?: Yes      Future Airway Recommendations:  Glidescope    Complications:  None    Airway Device:  Oral endotracheal tube    Airway Device Size:  8.0    Style/Cuff Inflation:  Cuffed (inflated to minimal occlusive pressure)    Tube secured:  23    Secured at:  The teeth    Placement Verified By:  Capnometry    Complicating Factors:  Anterior larynx    Findings Post-Intubation:  BS equal bilateral and atraumatic/condition of teeth unchanged  Notes:      HELEN by GENE mon

## 2023-02-08 NOTE — ANESTHESIA PROCEDURE NOTES
LAXMI    Diagnosis: Mitral valve endocarditis  Patient location during procedure: OR  Exam type: Baseline    Staffing  Performed: anesthesiologist     Anesthesiologist: Madina Zavala MD        Anesthesiologist Present  Yes      Setup & Induction  Patient preparation: bite block inserted  Probe Insertion: easy  Exam: completeDoppler Echo: 2D, 3D and color flow mapping.      After induction of general endotracheal anesthesia in the operating room, transesophageal echo probe was placed atraumatically esophagus for evaluation of cardiovascular structures.      1. The ascending aorta, transverse arch and descending thoracic aorta to the level of diaphragm are well visualized.  The aorta is normal in caliber throughout its entire visualized portion.  No significant calcifications or intraluminal irregularities are seen.    2. Right atrium, left atrium, left atrial appendage are well-visualized.  Left atrium is moderately enlarged.  Right atrium is upper limits of normal in size.  There was no thrombus, tumor or evidence of an interatrial septal defect.      3. Tricuspid valve annulus is moderately dilated, and trivial central regurgitation is seen.      4. Pulmonic valve is also similarly mildly dilated and trivial regurgitation is seen.  A pulmonary artery catheter is visualized exiting through the right ventricular outflow tract into the main pulmonary artery.      5. Right ventricle is moderately dilated, RV systolic pressure was measured at 28 mmHg.  The ventricle is normal kinetic.      6. Mitral valve annulus measures 42 mm in diet and monitor in the bike commissure of view.  There is a long fibrous mass freely prolapsing from the left ventricle into the left atrium.  The A1 and A2 segment of the anterior leaflet appear fragile and appeared to be replaced by what is probably a sterile endocarditis remnant.  There is also probably a ruptured cord subtending the A2 segment.  There is significant and severe eccentric  mitral regurgitation associated with this defect.    7. The aortic valve is a normal trileaflet valve without stenosis, sclerosis, insufficiency or evidence of endocarditis.      8. Left ventricle is moderately dilated, left ventricular end-diastolic pressure is increased, but systolic function is well preserved with ejection fraction is 60%, no focal wall motion abnormalities are identified.      9. No pleural or pericardial effusions are seen.      The above findings were discussed with the surgeon prior to surgical incision.

## 2023-02-08 NOTE — TRANSFER OF CARE
"Anesthesia Transfer of Care Note    Patient: Flako Power    Procedure(s) Performed: Procedure(s) (LRB):  REPLACEMENT, MITRAL VALVE (N/A)    Patient location: ICU    Anesthesia Type: general    Transport from OR: Transported from OR intubated on 100% O2 by AMBU with adequate controlled ventilation. Upon arrival to PACU/ICU, patient attached to ventilator and auscultated to confirm bilateral breath sounds and adequate TV. Continuous SpO2 monitoring in transport. Continuous ECG monitoring in transport. Continuos invasive BP monitoring in transport    Post pain: adequate analgesia    Post assessment: no apparent anesthetic complications    Post vital signs: stable    Level of consciousness: sedated    Nausea/Vomiting: no nausea/vomiting    Complications: none    Transfer of care protocol was followedComments: Detailed report with handoff to licensed provider complete      Last vitals:   Visit Vitals  /62   Pulse 80   Temp 37 °C (98.6 °F)   Resp 20   Ht 4' 11" (1.499 m)   Wt 54.5 kg (120 lb 2.4 oz)   SpO2 99%   BMI 24.27 kg/m²     "

## 2023-02-08 NOTE — ANESTHESIA PROCEDURE NOTES
Central Line    Diagnosis: Coronary Artery Bypass  Patient location during procedure: done in OR  Timeout: 2/8/2023 7:13 AM  Procedure end time: 2/8/2023 7:16 AM    Staffing  Authorizing Provider: Madina Zavala MD  Performing Provider: Madina Zavala MD    Staffing  Performed: anesthesiologist   Anesthesiologist: Madina Zavala MD  Anesthesiologist was present at the time of the procedure.  Preanesthetic Checklist  Completed: patient identified, risks and benefits discussed, monitors and equipment checked, timeout performed and anesthesia consent given  Indication   Indication: hemodynamic monitoring, vascular access, med administration     Anesthesia   general anesthesia    Central Line   Skin Prep: skin prepped with ChloraPrep, skin prep agent completely dried prior to procedure  Sterile Barriers Followed: Yes    All five maximal barriers used- gloves, gown, cap, mask, and large sterile sheet    hand hygiene performed prior to central venous catheter insertion  Location: right internal jugular.   Catheter type: triple lumen  Catheter Size: 9 Fr  Inserted Catheter Length: 16 cm  Ultrasound: none     Manometry: none  Insertion Attempts: 1   Securement:line sutured, chlorhexidine patch, sterile dressing applied and blood return through all ports    Post-Procedure   X-Ray: successful placement   Adverse Events:none      Guidewire Guidewire removed intact.

## 2023-02-08 NOTE — H&P
Critical Care Medicine History and Physical Note   [facility] - [facility] ICU      Patient Name: Flako Power  MRN: [id number]  Admission Date: 2/8/2023  Hospital Length of Stay: 0 days  Code Status: Full Code  Attending Provider: Jose Ramon Rodrigues MD  Primary Care Provider: Primary Doctor No   Principal Problem: <principal problem not specified>        HPI:  This is a 52-year-old male  with no significant past medical history  presented to [facility] ED  on 01/16/2023 with complaint of back pain. His  symptoms initially started in mid December 2022  with left knee pain and was seen at  at an urgent care and had left knees x-rays that were unremarkable and was prescribed NSAID,  subsequently started having back pain  and visited [facility] ED on 12/20/2022 and was diagnosed with lumbosacral strain and prescribed NSAID, tramadol and baclofen.  His mid lower back pain continued to get worse and over the past week started having nonproductive cough upon waking from sleep in the morning and had noticed that whenever he coughs his back pain worsens and radiates bilaterally to his abdomen.  Denies loss of bowel or bladder control or lower extremity weakness. Denies shortness breath, chest pain, fever or chills until he arrived to [facility] ED today on 1/6/2023 where he found to be febrile 102.7, tachycardic and hypertensive.  His labs notable for WBC 12.3, ESR 63, CRP 89.  COVID 19 and flu negative. CTA chest show no pulmonary embolus or parenchymal lung disease, irregularity of T8 inferior endplate may relate to degenerative change but cannot exclude discitis/osteomyelitis.  CT lumbar spine showed degenerative changes and spondylolisthesis at L5-S1. He was given IV fluid, ceftriaxone and vancomycin and  transferred to [facility] on 1/16/23 and referred to hospital medicine service for further evaluation and management.    He has subsequently been found on transesophageal echocardiogram to have severe mitral regurgitation with what  appears to be a tethered anterior mitral valve leaflet.  There does not seem to be any evidence of vegetation, however, it is hard to say.  There was no evidence abscess or large valvular vegetation greater than 1 cm.  There is also no evidence of discrete embolization.  Blood cultures have been positive for Gram-positive organisms and we await Infectious Disease recommendations.      To the patient underwent mechanical mitral valve replacement.        Past Medical History:   Diagnosis Date    Cough     Hypertension     Infective endocarditis     Mitral incompetence     PICC (peripherally inserted central catheter) in place          Past Surgical History:   Procedure Laterality Date    ABDOMINAL SURGERY      LEFT HEART CATHETERIZATION N/A 01/19/2023    Procedure: Left heart cath;  Surgeon: Speedy Rendon Jr., MD;  Location: [facility] CATH LAB;  Service: Cardiology;  Laterality: N/A;  Diagnostic Procedure Only- Severe MR Awaiting Surgery.    TONSILLECTOMY           Social History     Socioeconomic History    Marital status: Single   Tobacco Use    Smoking status: Never    Smokeless tobacco: Never   Substance and Sexual Activity    Alcohol use: Not Currently    Drug use: Never         History reviewed. No pertinent family history.      Drug Allergies:   Review of patient's allergies indicates:  No Known Allergies      Current Infusions:   clevidipine      dexmedetomidine (PRECEDEX) infusion      dextrose 5 % and 0.45 % NaCl      EPINEPHrine      insulin regular 1 units/mL infusion orderable (CTS POST-OP)      loperamide           Scheduled Medications:     amLODIPine  5 mg Oral Daily    [START ON 2/9/2023] aspirin  81 mg Oral Daily    cefTRIAXone  2 g Intravenous Q12H    clevidipine        [START ON 2/9/2023] docusate sodium  100 mg Oral BID    [START ON 2/9/2023] enoxaparin  40 mg Subcutaneous Daily    [START ON 2/9/2023] folic acid  1 mg Oral Daily    [START ON 2/9/2023] metoprolol tartrate  12.5 mg Oral BID     mupirocin   Topical (Top) Once Pre-Op    mupirocin   Nasal BID    [START ON 2/9/2023] sucralfate  1 g Oral QID (AC & HS)         PRN Medications:   sodium chloride, albumin human 5%, calcium gluconate IVPB, calcium gluconate IVPB, calcium gluconate IVPB, dextrose 10%, dextrose 10%, HYDROcodone-acetaminophen, lactulose 10 gram/15 ml, loperamide, magnesium sulfate IVPB, magnesium sulfate IVPB, metoclopramide HCl, morphine, ondansetron, oxyCODONE, potassium chloride in water, potassium chloride in water, potassium chloride in water, sodium phosphate IVPB, sodium phosphate IVPB, sodium phosphate IVPB      Review of Systems   Reason unable to perform ROS: Will perform full 14 point review of systems as patient is intubated sedated and unresponsive.       Vital Signs:    Vitals:    02/08/23 1059   BP: 106/62   Pulse: 80   Resp: 20   Temp: 98.6 °F (37 °C)         Fluid Balance:     Intake/Output Summary (Last 24 hours) at 2/8/2023 1214  Last data filed at 2/8/2023 1059  Gross per 24 hour   Intake 2204 ml   Output 1520 ml   Net 684 ml         Physical Exam  Vitals and nursing note reviewed.   Constitutional:       General: He is not in acute distress.     Appearance: Normal appearance. He is not toxic-appearing.   HENT:      Head: Normocephalic and atraumatic.      Right Ear: External ear normal.      Left Ear: External ear normal.      Nose: Nose normal.      Mouth/Throat:      Pharynx: No posterior oropharyngeal erythema.      Comments: Unable to visualize posterior oropharynx secondary to endotracheal tube  Eyes:      General: No scleral icterus.     Conjunctiva/sclera: Conjunctivae normal.      Pupils: Pupils are equal, round, and reactive to light.   Neck:      Vascular: No carotid bruit.   Cardiovascular:      Rate and Rhythm: Normal rate and regular rhythm.      Pulses: Normal pulses.      Heart sounds: Normal heart sounds. No murmur heard.    No friction rub. No gallop.   Pulmonary:      Effort: Pulmonary effort is  normal. No respiratory distress.      Breath sounds: Normal breath sounds. No wheezing, rhonchi or rales.      Comments: Intubated, on mechanical ventilation  Abdominal:      General: Abdomen is flat. Bowel sounds are normal. There is no distension.      Palpations: Abdomen is soft.      Tenderness: There is no abdominal tenderness. There is no guarding or rebound.   Musculoskeletal:         General: No swelling or deformity.      Cervical back: Neck supple. No rigidity or tenderness.   Skin:     General: Skin is warm and dry.      Capillary Refill: Capillary refill takes less than 2 seconds.      Findings: No erythema or rash.   Neurological:      Comments: Unable to fully assess neurologic status and orientation secondary to patient being intubated, sedated and nonverbal   Psychiatric:      Comments: Unable to fully assess as patient is intubated and nonverbal         Ventilator  Vent Mode: CPAP PSV (02/08/23 1142)  Set Rate: 18 BPM (02/08/23 1132)  Vt Set: 400 mL (02/08/23 1132)  Pressure Support: 10 cmH20 (02/08/23 1142)  PEEP/CPAP: 5 cmH20 (02/08/23 1142)  Oxygen Concentration (%): 30 (02/08/23 1142)      Laboratory Studies:     Recent Labs   Lab 02/08/23  1137   PH 7.33*   PCO2 44   PO2 182*   HCO3 23.2   POCSATURATED 100       Recent Labs   Lab 02/08/23  0911   WBC 6.6   RBC 2.95*   HGB 7.8*   HCT 24.7*      MCV 83.7   MCH 26.4   MCHC 31.6*       Recent Labs   Lab 02/08/23 0911   GLUCOSE 129*      K 4.1   CO2 20*   BUN 8.4   CREATININE 0.69*         Microbiology Data:   Microbiology Results (last 7 days)       Procedure Component Value Units Date/Time    Tissue Culture - Aerobic [[id number]] Collected: 02/08/23 0931    Order Status: Sent Specimen: Tissue from Heart Valve Updated: 02/08/23 1110    Anaerobic Culture [[id number]] Collected: 02/08/23 0931    Order Status: Sent Specimen: Tissue from Heart Valve     Blood Culture [[id number]] Collected: 02/08/23 0558    Order Status: Resulted Specimen:  Blood from Arm, Left Updated: 02/08/23 0632    Blood Culture [[id number]] Collected: 02/08/23 0604    Order Status: Resulted Specimen: Blood from Hand, Left Updated: 02/08/23 0631            Imaging reviewed:  X-Ray Chest AP Portable  Narrative: EXAMINATION:  XR CHEST AP PORTABLE    CLINICAL HISTORY:  Post-op;    TECHNIQUE:  Single frontal view of the chest was performed.    COMPARISON:  February 6, 2023    FINDINGS:  Examination reveals patient to be status post medial sternotomy there is an endotracheal tube with the tip above the scott nasogastric tube is seen with the tip below the diaphragm mediastinal drainage catheters are identified as well as C central line with a [place] catheter in the pulmonary outflow tract.    Cardiomediastinal silhouette improved parenchymal changes otherwise unchanged as compared with the previous exam  Impression: Postoperative changes    Electronically signed by: Dillon Varghese  Date:    02/08/2023  Time:    12:00        2D ECHO Results    No results found in the last 24 hours.       Pulmonary Functions Testing Results:    No results found for: FEV1, FVC, BDA8ALX, TLC, DLCO      Assessment and Plan:    Assessment:  Postoperative day 0 status post mechanical mitral valve replacement  Subacute bacterial endocarditis   History of diskitis    Plan:  -discontinue lines tubes medications mechanical ventilation per CV surgery protocol  -continue current antimicrobial regimen        Chidi Ortez MD  2/8/2023  Pulmonology/Critical Care

## 2023-02-08 NOTE — BRIEF OP NOTE
[facility] - Peri Services  Cardiothoracic Surgery  Operative Note    SUMMARY     Date of Procedure: 2/8/2023     Procedure: Procedure(s) (LRB):  REPLACEMENT, MITRAL VALVE (N/A)    MVR Medtronic 33mm mech prosthesis   JEN ligation/ Atriclip    Surgeon(s) and Role:     * Jose Ramon Rodrigues MD - Primary    Assistant: Speedy Almonte PA-C    Pre-Operative Diagnosis: Severe mitral valve regurgitation [I34.0]  Infective endocarditis of mitral valve [I33.0]    Post-Operative Diagnosis: Post-Op Diagnosis Codes:     * Severe mitral valve regurgitation [I34.0]     * Infective endocarditis of mitral valve [I33.0]    Anesthesia: General    Operative Findings (including complications, if any):            Specimens:   Specimen (24h ago, onward)       Start     Ordered    02/08/23 0934  Specimen to Pathology  RELEASE UPON ORDERING        References:    Click here for ordering Quick Tip   Question:  Release to patient  Answer:  Immediate    02/08/23 0934                            Condition: Good    Disposition: PACU - hemodynamically stable.

## 2023-02-08 NOTE — ANESTHESIA PROCEDURE NOTES
Arterial    Diagnosis: IABP monitoring and/or frequent blood draws    Patient location during procedure: pre-op    Staffing  Authorizing Provider: Madina Zavala MD  Performing Provider: Tori Sanchez CRNA    Anesthesiologist was present at the time of the procedure.    Preanesthetic Checklist  Completed: patient identified, IV checked, site marked, risks and benefits discussed, surgical consent, monitors and equipment checked, pre-op evaluation, timeout performed and anesthesia consent givenArterial  Skin Prep: chlorhexidine gluconate  Local Infiltration: lidocaine  Orientation: right  Location: radial    Catheter Size: 20 G  Catheter placement by Anatomical landmarks. Heme positive aspiration all ports. Insertion Attempts: 1  Assessment  Dressing: secured with tape and tegaderm  Patient: Tolerated well

## 2023-02-09 LAB
ANION GAP SERPL CALC-SCNC: 7 MEQ/L
BASOPHILS # BLD AUTO: 0.01 X10(3)/MCL (ref 0–0.2)
BASOPHILS NFR BLD AUTO: 0.1 %
BUN SERPL-MCNC: 10.3 MG/DL (ref 8.4–25.7)
CALCIUM SERPL-MCNC: 7.9 MG/DL (ref 8.4–10.2)
CHLORIDE SERPL-SCNC: 111 MMOL/L (ref 98–107)
CO2 SERPL-SCNC: 18 MMOL/L (ref 22–29)
CREAT SERPL-MCNC: 0.76 MG/DL (ref 0.73–1.18)
CREAT/UREA NIT SERPL: 14
EOSINOPHIL # BLD AUTO: 0 X10(3)/MCL (ref 0–0.9)
EOSINOPHIL NFR BLD AUTO: 0 %
ERYTHROCYTE [DISTWIDTH] IN BLOOD BY AUTOMATED COUNT: 15.6 % (ref 11.5–17)
GFR SERPLBLD CREATININE-BSD FMLA CKD-EPI: >60 MLS/MIN/1.73/M2
GLUCOSE SERPL-MCNC: 124 MG/DL (ref 74–100)
HCT VFR BLD AUTO: 30.3 % (ref 42–52)
HGB BLD-MCNC: 10 GM/DL (ref 14–18)
IMM GRANULOCYTES # BLD AUTO: 0.09 X10(3)/MCL (ref 0–0.04)
IMM GRANULOCYTES NFR BLD AUTO: 0.7 %
LYMPHOCYTES # BLD AUTO: 0.55 X10(3)/MCL (ref 0.6–4.6)
LYMPHOCYTES NFR BLD AUTO: 4.2 %
MCH RBC QN AUTO: 26.9 PG
MCHC RBC AUTO-ENTMCNC: 33 MG/DL (ref 33–36)
MCV RBC AUTO: 81.5 FL (ref 80–94)
MONOCYTES # BLD AUTO: 1.99 X10(3)/MCL (ref 0.1–1.3)
MONOCYTES NFR BLD AUTO: 15.2 %
NEUTROPHILS # BLD AUTO: 10.49 X10(3)/MCL (ref 2.1–9.2)
NEUTROPHILS NFR BLD AUTO: 79.8 %
NRBC BLD AUTO-RTO: 0 %
PLATELET # BLD AUTO: 293 X10(3)/MCL (ref 130–400)
PMV BLD AUTO: 10.2 FL (ref 7.4–10.4)
POCT GLUCOSE: 110 MG/DL (ref 70–110)
POCT GLUCOSE: 120 MG/DL (ref 70–110)
POCT GLUCOSE: 123 MG/DL (ref 70–110)
POCT GLUCOSE: 134 MG/DL (ref 70–110)
POTASSIUM SERPL-SCNC: 4 MMOL/L (ref 3.5–5.1)
PSYCHE PATHOLOGY RESULT: NORMAL
RBC # BLD AUTO: 3.72 X10(6)/MCL (ref 4.7–6.1)
SODIUM SERPL-SCNC: 136 MMOL/L (ref 136–145)
WBC # SPEC AUTO: 13.1 X10(3)/MCL (ref 4.5–11.5)

## 2023-02-09 PROCEDURE — C9248 INJ, CLEVIDIPINE BUTYRATE: HCPCS | Mod: JG | Performed by: THORACIC SURGERY (CARDIOTHORACIC VASCULAR SURGERY)

## 2023-02-09 PROCEDURE — 27000221 HC OXYGEN, UP TO 24 HOURS

## 2023-02-09 PROCEDURE — 94799 UNLISTED PULMONARY SVC/PX: CPT

## 2023-02-09 PROCEDURE — 80048 BASIC METABOLIC PNL TOTAL CA: CPT | Performed by: PHYSICIAN ASSISTANT

## 2023-02-09 PROCEDURE — 20000000 HC ICU ROOM

## 2023-02-09 PROCEDURE — 99024 POSTOP FOLLOW-UP VISIT: CPT | Mod: ,,, | Performed by: PHYSICIAN ASSISTANT

## 2023-02-09 PROCEDURE — 63600175 PHARM REV CODE 636 W HCPCS: Mod: TB,JG | Performed by: PHYSICIAN ASSISTANT

## 2023-02-09 PROCEDURE — 85025 COMPLETE CBC W/AUTO DIFF WBC: CPT | Performed by: PHYSICIAN ASSISTANT

## 2023-02-09 PROCEDURE — 82803 BLOOD GASES ANY COMBINATION: CPT

## 2023-02-09 PROCEDURE — 99024 PR POST-OP FOLLOW-UP VISIT: ICD-10-PCS | Mod: ,,, | Performed by: PHYSICIAN ASSISTANT

## 2023-02-09 PROCEDURE — 99900035 HC TECH TIME PER 15 MIN (STAT)

## 2023-02-09 PROCEDURE — 25000003 PHARM REV CODE 250: Performed by: PHYSICIAN ASSISTANT

## 2023-02-09 PROCEDURE — 63600175 PHARM REV CODE 636 W HCPCS: Mod: JG | Performed by: THORACIC SURGERY (CARDIOTHORACIC VASCULAR SURGERY)

## 2023-02-09 PROCEDURE — 94761 N-INVAS EAR/PLS OXIMETRY MLT: CPT

## 2023-02-09 PROCEDURE — 37799 UNLISTED PX VASCULAR SURGERY: CPT

## 2023-02-09 RX ORDER — HYDRALAZINE HYDROCHLORIDE 20 MG/ML
10 INJECTION INTRAMUSCULAR; INTRAVENOUS EVERY 4 HOURS PRN
Status: DISCONTINUED | OUTPATIENT
Start: 2023-02-09 | End: 2023-02-20 | Stop reason: HOSPADM

## 2023-02-09 RX ORDER — LOSARTAN POTASSIUM 25 MG/1
25 TABLET ORAL DAILY
Status: DISCONTINUED | OUTPATIENT
Start: 2023-02-10 | End: 2023-02-20 | Stop reason: HOSPADM

## 2023-02-09 RX ORDER — MUPIROCIN 20 MG/G
OINTMENT TOPICAL 2 TIMES DAILY
Status: CANCELLED | OUTPATIENT
Start: 2023-02-09 | End: 2023-02-14

## 2023-02-09 RX ORDER — ASPIRIN 81 MG/1
81 TABLET ORAL DAILY
Status: CANCELLED | OUTPATIENT
Start: 2023-02-09

## 2023-02-09 RX ORDER — POLYETHYLENE GLYCOL 3350 17 G/17G
17 POWDER, FOR SOLUTION ORAL DAILY PRN
Status: CANCELLED | OUTPATIENT
Start: 2023-02-09

## 2023-02-09 RX ORDER — DOCUSATE SODIUM 100 MG/1
200 CAPSULE, LIQUID FILLED ORAL 2 TIMES DAILY
Status: CANCELLED | OUTPATIENT
Start: 2023-02-09

## 2023-02-09 RX ORDER — BISACODYL 10 MG
10 SUPPOSITORY, RECTAL RECTAL DAILY PRN
Status: CANCELLED | OUTPATIENT
Start: 2023-02-09

## 2023-02-09 RX ADMIN — POTASSIUM CHLORIDE 20 MEQ: 14.9 INJECTION, SOLUTION INTRAVENOUS at 08:02

## 2023-02-09 RX ADMIN — CLEVIPIDINE 4 MG/HR: 0.5 EMULSION INTRAVENOUS at 06:02

## 2023-02-09 RX ADMIN — MORPHINE SULFATE 4 MG: 10 INJECTION, SOLUTION INTRAMUSCULAR; INTRAVENOUS at 11:02

## 2023-02-09 RX ADMIN — ASPIRIN 81 MG: 81 TABLET, COATED ORAL at 08:02

## 2023-02-09 RX ADMIN — MUPIROCIN: 20 OINTMENT TOPICAL at 08:02

## 2023-02-09 RX ADMIN — ENOXAPARIN SODIUM 40 MG: 40 INJECTION SUBCUTANEOUS at 04:02

## 2023-02-09 RX ADMIN — SUCRALFATE 1 G: 1 TABLET ORAL at 11:02

## 2023-02-09 RX ADMIN — CLEVIPIDINE 4 MG/HR: 0.5 EMULSION INTRAVENOUS at 03:02

## 2023-02-09 RX ADMIN — CEFTRIAXONE SODIUM 2 G: 2 INJECTION, POWDER, FOR SOLUTION INTRAMUSCULAR; INTRAVENOUS at 02:02

## 2023-02-09 RX ADMIN — ONDANSETRON 4 MG: 2 INJECTION INTRAMUSCULAR; INTRAVENOUS at 12:02

## 2023-02-09 RX ADMIN — DOCUSATE SODIUM 100 MG: 100 CAPSULE, LIQUID FILLED ORAL at 08:02

## 2023-02-09 RX ADMIN — AMLODIPINE BESYLATE 5 MG: 5 TABLET ORAL at 07:02

## 2023-02-09 RX ADMIN — SUCRALFATE 1 G: 1 TABLET ORAL at 04:02

## 2023-02-09 RX ADMIN — ONDANSETRON 4 MG: 2 INJECTION INTRAMUSCULAR; INTRAVENOUS at 09:02

## 2023-02-09 RX ADMIN — FOLIC ACID 1 MG: 1 TABLET ORAL at 08:02

## 2023-02-09 RX ADMIN — CLEVIPIDINE 4 MG/HR: 0.5 EMULSION INTRAVENOUS at 09:02

## 2023-02-09 RX ADMIN — METOPROLOL TARTRATE 12.5 MG: 25 TABLET, FILM COATED ORAL at 08:02

## 2023-02-09 RX ADMIN — ONDANSETRON 4 MG: 2 INJECTION INTRAMUSCULAR; INTRAVENOUS at 07:02

## 2023-02-09 RX ADMIN — OXYCODONE 10 MG: 5 TABLET ORAL at 05:02

## 2023-02-09 RX ADMIN — SUCRALFATE 1 G: 1 TABLET ORAL at 08:02

## 2023-02-09 NOTE — PROGRESS NOTES
CT SURGERY PROGRESS NOTE  Flako Power  52 y.o.  1970    Patients Procedure: Procedure(s) (LRB):  REPLACEMENT, MITRAL VALVE (N/A)    Subjective  Interval History: POD 1    ROS    Medication List  Infusions   clevidipine 4 mg/hr (02/09/23 0312)    dexmedetomidine (PRECEDEX) infusion      dextrose 5 % and 0.45 % NaCl 100 mL/hr at 02/08/23 1130    EPINEPHrine      insulin regular 1 units/mL infusion orderable (CTS POST-OP) 1.6 Units/hr (02/08/23 1300)    loperamide       Scheduled   amLODIPine  5 mg Oral Daily    aspirin  81 mg Oral Daily    cefTRIAXone  2 g Intravenous Q12H    docusate sodium  100 mg Oral BID    enoxaparin  40 mg Subcutaneous Daily    folic acid  1 mg Oral Daily    metoprolol tartrate  12.5 mg Oral BID    mupirocin   Nasal BID    sucralfate  1 g Oral QID (AC & HS)       Objective:  Recent Vitals:  Temp:  [97.7 °F (36.5 °C)-98.9 °F (37.2 °C)] 98.4 °F (36.9 °C)  Pulse:  [] 73  Resp:  [18-38] 22  SpO2:  [98 %-100 %] 100 %  BP: ()/(62-92) 141/64  Arterial Line BP: ()/(57-80) 137/68    Physical Exam     I/O last 24 hrs:  Intake/Output - Last 3 Shifts         02/07 0700  02/08 0659 02/08 0700  02/09 0659    I.V. (mL/kg)  1341 (22.9)    Blood  654    IV Piggyback  1600    Total Intake(mL/kg)  3595 (61.5)    Urine (mL/kg/hr)  1935 (1.4)    Emesis/NG output  0    Stool  0    Blood  1200    Chest Tube  40    Total Output  3175    Net  +420          Stool Occurrence  0 x    Emesis Occurrence  1 x            Labs  ABGs:   Recent Labs   Lab 02/08/23  1218   PH 7.35   PCO2 41   PO2 99   HCO3 22.6   POCSATURATED 97.3     BMP:   Recent Labs   Lab 02/08/23  1157 02/08/23  1651     --    K 3.6 3.9   CO2 20*  --    BUN 8.6  --    CREATININE 0.79  --    CALCIUM 9.7  --    MG 2.40  --      CBC:   Recent Labs   Lab 02/08/23  1157 02/08/23  1657   WBC 12.6*  --    RBC 3.68*  --    HGB 9.8* 10.0*   HCT 29.8* 30.9*     --    MCV 81.0  --    MCH 26.6  --    MCHC 32.9*  --      CMP:    Recent Labs   Lab 02/08/23  1157 02/08/23  1651   CALCIUM 9.7  --      --    K 3.6 3.9   CO2 20*  --    BUN 8.6  --    CREATININE 0.79  --      Coagulation:   Recent Labs   Lab 02/08/23  1157   INR 1.44*         Imaging:   CXR: X-Ray Chest AP Portable    Result Date: 2/8/2023  Postoperative changes Electronically signed by: Dillon Varghese Date:    02/08/2023 Time:    12:00         ASSESSMENT/PLAN:    Doing well POD 1  ID consulted  Telem  Mobilize  Will discuss coumadin with Dr Rodrigues     Case and plan of care discussed with MD Speedy Almonte PA-C

## 2023-02-09 NOTE — PROGRESS NOTES
Pulmonary & Critical Care Medicine   Progress Note      Presenting History/HPI:  This is a 52-year-old male  with no significant past medical history  presented to [facility] ED  on 01/16/2023 with complaint of back pain. His  symptoms initially started in mid December 2022  with left knee pain and was seen at  at an urgent care and had left knees x-rays that were unremarkable and was prescribed NSAID,  subsequently started having back pain  and visited [facility] ED on 12/20/2022 and was diagnosed with lumbosacral strain and prescribed NSAID, tramadol and baclofen.  His mid lower back pain continued to get worse and over the past week started having nonproductive cough upon waking from sleep in the morning and had noticed that whenever he coughs his back pain worsens and radiates bilaterally to his abdomen.  Denies loss of bowel or bladder control or lower extremity weakness. Denies shortness breath, chest pain, fever or chills until he arrived to [facility] ED today on 1/6/2023 where he found to be febrile 102.7, tachycardic and hypertensive.  His labs notable for WBC 12.3, ESR 63, CRP 89.  COVID 19 and flu negative. CTA chest show no pulmonary embolus or parenchymal lung disease, irregularity of T8 inferior endplate may relate to degenerative change but cannot exclude discitis/osteomyelitis.  CT lumbar spine showed degenerative changes and spondylolisthesis at L5-S1. He was given IV fluid, ceftriaxone and vancomycin and  transferred to [facility] on 1/16/23 and referred to hospital medicine service for further evaluation and management.    He has subsequently been found on transesophageal echocardiogram to have severe mitral regurgitation with what appears to be a tethered anterior mitral valve leaflet.  There does not seem to be any evidence of vegetation, however, it is hard to say.  There was no evidence abscess or large valvular vegetation greater than 1 cm.  There is also no evidence of discrete embolization.  Blood cultures  have been positive for Gram-positive organisms and we await Infectious Disease recommendations.            Interval History:  -postoperative day 1 status post mitral valve replacement   -requiring Cleviprex for blood pressure control with goal to maintain systolic pressure 140   -still with epicardial pacer leads in place with intrinsic rhythm normal sinus rhythm  -patient complaining of upper abdominal pain without nausea or vomiting.      Scheduled Medications:    amLODIPine  5 mg Oral Daily    aspirin  81 mg Oral Daily    cefTRIAXone  2 g Intravenous Q12H    docusate sodium  100 mg Oral BID    enoxaparin  40 mg Subcutaneous Daily    folic acid  1 mg Oral Daily    metoprolol tartrate  12.5 mg Oral BID    mupirocin   Nasal BID    sucralfate  1 g Oral QID (AC & HS)       PRN Medications:   sodium chloride, albumin human 5%, calcium gluconate IVPB, calcium gluconate IVPB, calcium gluconate IVPB, dextrose 10%, dextrose 10%, hydrALAZINE, HYDROcodone-acetaminophen, lactulose 10 gram/15 ml, loperamide, magnesium sulfate IVPB, magnesium sulfate IVPB, metoclopramide HCl, morphine, ondansetron, oxyCODONE, potassium chloride in water, potassium chloride in water, potassium chloride in water, sodium phosphate IVPB, sodium phosphate IVPB, sodium phosphate IVPB      Infusions:     clevidipine 2 mg/hr (02/09/23 1033)    dexmedetomidine (PRECEDEX) infusion      dextrose 5 % and 0.45 % NaCl 100 mL/hr at 02/08/23 1130    EPINEPHrine      insulin regular 1 units/mL infusion orderable (CTS POST-OP) Stopped (02/09/23 0809)    loperamide           Fluid Balance:     Intake/Output Summary (Last 24 hours) at 2/9/2023 1449  Last data filed at 2/9/2023 1200  Gross per 24 hour   Intake 1391 ml   Output 1845 ml   Net -454 ml           Vital Signs:   Vitals:    02/09/23 1230   BP: (!) 121/57   Pulse: 73   Resp: (!) 33   Temp:          Physical Exam  Vitals and nursing note reviewed.   Constitutional:       General: He is not in acute  distress.     Appearance: Normal appearance. He is not ill-appearing or toxic-appearing.   HENT:      Head: Normocephalic and atraumatic.      Right Ear: External ear normal.      Left Ear: External ear normal.      Nose: Nose normal.      Mouth/Throat:      Mouth: Mucous membranes are moist.      Pharynx: Oropharynx is clear. No oropharyngeal exudate or posterior oropharyngeal erythema.   Eyes:      General: No scleral icterus.     Extraocular Movements: Extraocular movements intact.      Conjunctiva/sclera: Conjunctivae normal.      Pupils: Pupils are equal, round, and reactive to light.   Neck:      Vascular: No carotid bruit.   Cardiovascular:      Rate and Rhythm: Normal rate and regular rhythm.      Pulses: Normal pulses.      Heart sounds: Normal heart sounds. No murmur heard.    No friction rub. No gallop.      Comments: Mediastinal tubes and chest tubes in place with bloody output, epicardial pacer leads in place  Pulmonary:      Effort: Pulmonary effort is normal. No respiratory distress.      Breath sounds: Normal breath sounds. No wheezing, rhonchi or rales.   Abdominal:      General: Abdomen is flat. Bowel sounds are normal. There is no distension.      Palpations: Abdomen is soft.      Tenderness: There is no abdominal tenderness. There is no guarding or rebound.   Genitourinary:     Comments: deferred  Musculoskeletal:         General: No swelling or deformity. Normal range of motion.      Cervical back: Normal range of motion and neck supple. No rigidity or tenderness.   Lymphadenopathy:      Cervical: No cervical adenopathy.   Skin:     General: Skin is warm and dry.      Capillary Refill: Capillary refill takes less than 2 seconds.      Coloration: Skin is not jaundiced.      Findings: No bruising, lesion or rash.   Neurological:      General: No focal deficit present.      Mental Status: He is alert.      Sensory: No sensory deficit.      Motor: No weakness.   Psychiatric:         Mood and Affect:  Mood normal.       Laboratory Studies:   No results for input(s): PH, PCO2, PO2, HCO3, POCSATURATED, BE in the last 24 hours.  Recent Labs   Lab 02/09/23  0611   WBC 13.1*   RBC 3.72*   HGB 10.0*   HCT 30.3*      MCV 81.5   MCH 26.9   MCHC 33.0     Recent Labs   Lab 02/09/23  0611   GLUCOSE 124*      K 4.0   CO2 18*   BUN 10.3   CREATININE 0.76         Microbiology Data:   Microbiology Results (last 7 days)       Procedure Component Value Units Date/Time    Blood Culture [[id number]]  (Normal) Collected: 02/08/23 0558    Order Status: Completed Specimen: Blood from Arm, Left Updated: 02/09/23 0700     CULTURE, BLOOD ([facility]) No Growth At 24 Hours    Blood Culture [[id number]]  (Normal) Collected: 02/08/23 0604    Order Status: Completed Specimen: Blood from Hand, Left Updated: 02/09/23 0700     CULTURE, BLOOD ([facility]) No Growth At 24 Hours    Tissue Culture - Aerobic [[id number]] Collected: 02/08/23 0931    Order Status: Completed Specimen: Tissue from Heart Valve Updated: 02/09/23 0645     CULTURE, TISSUE- AEROBIC ([facility]) No Growth At 24 Hours    Anaerobic Culture [[id number]] Collected: 02/08/23 0931    Order Status: Sent Specimen: Tissue from Heart Valve Updated: 02/08/23 1240              Imaging:   X-Ray Chest AP Portable  Narrative: EXAMINATION:  XR CHEST AP PORTABLE    CLINICAL HISTORY:  Post-op;    TECHNIQUE:  Frontal view(s) of the chest.    COMPARISON:  Radiography 02/08/2020    FINDINGS:  Endotracheal and enteric tubes no longer visualized.  Similar positioning of right PICC and right IJ sheath as well as thoracic drainage tubes.  Small right apical pneumothorax with maximum pleural gap of 17 mm superiorly.  Increased retrocardiac opacity since yesterday.  Possible small pleural effusions.  Stable cardiac silhouette.  Impression: Small right apical pneumothorax.  Increased retrocardiac opacity since yesterday.    Pneumothorax discussed with Mr. Power's ICU nurse, Libby at the time of  dictation.    Electronically signed by: Willie Moscoso  Date:    02/09/2023  Time:    07:30          Assessment and Plan    Assessment:  Postoperative day 1 status post mechanical mitral valve replacement   Subacute bacterial endocarditis   History of diskitis  Hypertension          Plan:  -discontinue lines tubes medications per CV surgery protocol   -titrate Cleviprex to blood pressure goal of 140 systolic, home oral medications restarted, may need addition or titration of other medications   -antibiotics to continue per current regimen for the above infections        Chidi Ortez MD  2/9/2023  Pulmonology/Critical Care

## 2023-02-09 NOTE — PROGRESS NOTES
02/09/23 1002   Discharge Assessment   Assessment Type Discharge Planning Assessment   Confirmed/corrected address, phone number and insurance Yes   Confirmed Demographics Correct on Facesheet   Source of Information patient;family   When was your last doctors appointment?   (no PCP)   Communicated ANURAG with patient/caregiver Date not available/Unable to determine   Reason For Admission valve replacement   People in Home child(orquidea), adult   Do you expect to return to your current living situation? Yes   Do you have help at home or someone to help you manage your care at home? Yes   Who are your caregiver(s) and their phone number(s)? daughter,Joaquina [phone]   Prior to hospitilization cognitive status: Alert/Oriented   Current cognitive status: Alert/Oriented   Home Accessibility wheelchair accessible   Home Layout Able to live on 1st floor   Equipment Currently Used at Home none   Readmission within 30 days? Yes   Patient currently being followed by outpatient case management? No   Do you currently have service(s) that help you manage your care at home? No   Do you have prescription coverage? No   Do you have any problems affording any of your prescribed medications? TBD   Who is going to help you get home at discharge? daughter   Are you on dialysis? No   Do you take coumadin? No   Discharge Plan A Home Health   Discharge Plan B Home with family   DME Needed Upon Discharge  none   Discharge Plan discussed with: Patient;Adult children   Discharge Barriers Identified Unisured     Met with patient and his two children, Joaquina and Hallie, for initial dc planning assessment. At baseline patient is IADL's and uses no equipment. Works full time and lives at home with daughter. Daughter states he was denied by Medicaid but they will be attempting short term disability. Patient will need HH for post acute needs r/t cardiac surgery.

## 2023-02-10 LAB
ANION GAP SERPL CALC-SCNC: 11 MEQ/L
BASOPHILS # BLD AUTO: 0.02 X10(3)/MCL (ref 0–0.2)
BASOPHILS NFR BLD AUTO: 0.1 %
BUN SERPL-MCNC: 10.7 MG/DL (ref 8.4–25.7)
CALCIUM SERPL-MCNC: 8.6 MG/DL (ref 8.4–10.2)
CHLORIDE SERPL-SCNC: 103 MMOL/L (ref 98–107)
CO2 SERPL-SCNC: 23 MMOL/L (ref 22–29)
CREAT SERPL-MCNC: 0.72 MG/DL (ref 0.73–1.18)
CREAT/UREA NIT SERPL: 15
EOSINOPHIL # BLD AUTO: 0 X10(3)/MCL (ref 0–0.9)
EOSINOPHIL NFR BLD AUTO: 0 %
ERYTHROCYTE [DISTWIDTH] IN BLOOD BY AUTOMATED COUNT: 15.8 % (ref 11.5–17)
GFR SERPLBLD CREATININE-BSD FMLA CKD-EPI: >60 MLS/MIN/1.73/M2
GLUCOSE SERPL-MCNC: 132 MG/DL (ref 74–100)
HCT VFR BLD AUTO: 26.8 % (ref 42–52)
HGB BLD-MCNC: 8.9 GM/DL (ref 14–18)
IMM GRANULOCYTES # BLD AUTO: 0.08 X10(3)/MCL (ref 0–0.04)
IMM GRANULOCYTES NFR BLD AUTO: 0.6 %
LYMPHOCYTES # BLD AUTO: 0.6 X10(3)/MCL (ref 0.6–4.6)
LYMPHOCYTES NFR BLD AUTO: 4.2 %
MCH RBC QN AUTO: 26.4 PG
MCHC RBC AUTO-ENTMCNC: 33.2 MG/DL (ref 33–36)
MCV RBC AUTO: 79.5 FL (ref 80–94)
MONOCYTES # BLD AUTO: 1.92 X10(3)/MCL (ref 0.1–1.3)
MONOCYTES NFR BLD AUTO: 13.5 %
NEUTROPHILS # BLD AUTO: 11.58 X10(3)/MCL (ref 2.1–9.2)
NEUTROPHILS NFR BLD AUTO: 81.6 %
NRBC BLD AUTO-RTO: 0 %
PLATELET # BLD AUTO: 262 X10(3)/MCL (ref 130–400)
PMV BLD AUTO: 10.5 FL (ref 7.4–10.4)
POCT GLUCOSE: 101 MG/DL (ref 70–110)
POCT GLUCOSE: 120 MG/DL (ref 70–110)
POTASSIUM SERPL-SCNC: 4.3 MMOL/L (ref 3.5–5.1)
RBC # BLD AUTO: 3.37 X10(6)/MCL (ref 4.7–6.1)
SODIUM SERPL-SCNC: 137 MMOL/L (ref 136–145)
WBC # SPEC AUTO: 14.2 X10(3)/MCL (ref 4.5–11.5)

## 2023-02-10 PROCEDURE — 21400001 HC TELEMETRY ROOM

## 2023-02-10 PROCEDURE — 63600175 PHARM REV CODE 636 W HCPCS: Performed by: PHYSICIAN ASSISTANT

## 2023-02-10 PROCEDURE — 63600175 PHARM REV CODE 636 W HCPCS: Mod: JG | Performed by: THORACIC SURGERY (CARDIOTHORACIC VASCULAR SURGERY)

## 2023-02-10 PROCEDURE — 94760 N-INVAS EAR/PLS OXIMETRY 1: CPT

## 2023-02-10 PROCEDURE — C9248 INJ, CLEVIDIPINE BUTYRATE: HCPCS | Mod: JG | Performed by: THORACIC SURGERY (CARDIOTHORACIC VASCULAR SURGERY)

## 2023-02-10 PROCEDURE — 99024 PR POST-OP FOLLOW-UP VISIT: ICD-10-PCS | Mod: ,,, | Performed by: PHYSICIAN ASSISTANT

## 2023-02-10 PROCEDURE — 97110 THERAPEUTIC EXERCISES: CPT

## 2023-02-10 PROCEDURE — 11000001 HC ACUTE MED/SURG PRIVATE ROOM

## 2023-02-10 PROCEDURE — 80048 BASIC METABOLIC PNL TOTAL CA: CPT | Performed by: PHYSICIAN ASSISTANT

## 2023-02-10 PROCEDURE — 99024 POSTOP FOLLOW-UP VISIT: CPT | Mod: ,,, | Performed by: PHYSICIAN ASSISTANT

## 2023-02-10 PROCEDURE — 25000003 PHARM REV CODE 250: Performed by: PHYSICIAN ASSISTANT

## 2023-02-10 PROCEDURE — 27000221 HC OXYGEN, UP TO 24 HOURS

## 2023-02-10 PROCEDURE — 85025 COMPLETE CBC W/AUTO DIFF WBC: CPT | Performed by: PHYSICIAN ASSISTANT

## 2023-02-10 RX ORDER — WARFARIN SODIUM 5 MG/1
5 TABLET ORAL DAILY
Status: DISCONTINUED | OUTPATIENT
Start: 2023-02-10 | End: 2023-02-13

## 2023-02-10 RX ADMIN — HYDROCODONE BITARTRATE AND ACETAMINOPHEN 1 TABLET: 5; 325 TABLET ORAL at 08:02

## 2023-02-10 RX ADMIN — LOSARTAN POTASSIUM 25 MG: 25 TABLET, FILM COATED ORAL at 08:02

## 2023-02-10 RX ADMIN — DOCUSATE SODIUM 100 MG: 100 CAPSULE, LIQUID FILLED ORAL at 07:02

## 2023-02-10 RX ADMIN — METOPROLOL TARTRATE 12.5 MG: 25 TABLET, FILM COATED ORAL at 07:02

## 2023-02-10 RX ADMIN — FOLIC ACID 1 MG: 1 TABLET ORAL at 08:02

## 2023-02-10 RX ADMIN — ASPIRIN 81 MG: 81 TABLET, COATED ORAL at 08:02

## 2023-02-10 RX ADMIN — CEFTRIAXONE SODIUM 2 G: 2 INJECTION, POWDER, FOR SOLUTION INTRAMUSCULAR; INTRAVENOUS at 02:02

## 2023-02-10 RX ADMIN — MUPIROCIN: 20 OINTMENT TOPICAL at 07:02

## 2023-02-10 RX ADMIN — ENOXAPARIN SODIUM 40 MG: 40 INJECTION SUBCUTANEOUS at 05:02

## 2023-02-10 RX ADMIN — METOPROLOL TARTRATE 12.5 MG: 25 TABLET, FILM COATED ORAL at 08:02

## 2023-02-10 RX ADMIN — SUCRALFATE 1 G: 1 TABLET ORAL at 06:02

## 2023-02-10 RX ADMIN — MUPIROCIN: 20 OINTMENT TOPICAL at 09:02

## 2023-02-10 RX ADMIN — AMLODIPINE BESYLATE 5 MG: 5 TABLET ORAL at 08:02

## 2023-02-10 RX ADMIN — SUCRALFATE 1 G: 1 TABLET ORAL at 11:02

## 2023-02-10 RX ADMIN — CEFTRIAXONE SODIUM 2 G: 2 INJECTION, POWDER, FOR SOLUTION INTRAMUSCULAR; INTRAVENOUS at 03:02

## 2023-02-10 RX ADMIN — HYDRALAZINE HYDROCHLORIDE 10 MG: 20 INJECTION INTRAMUSCULAR; INTRAVENOUS at 03:02

## 2023-02-10 RX ADMIN — CLEVIPIDINE 2 MG/HR: 0.5 EMULSION INTRAVENOUS at 01:02

## 2023-02-10 RX ADMIN — DOCUSATE SODIUM 100 MG: 100 CAPSULE, LIQUID FILLED ORAL at 08:02

## 2023-02-10 RX ADMIN — WARFARIN SODIUM 5 MG: 5 TABLET ORAL at 05:02

## 2023-02-10 NOTE — PROGRESS NOTES
02/10/23 1345   Pre Exercise Vitals   /56   Pulse 82   Supplemental O2? No   SpO2 94 %   During Exercise Vitals   Pulse 86   Supplemental O2? No   SpO2 92 %   Distance Walked 400 feet   Post Exercise Vitals   /58   Pulse 83   Supplemental O2? No   SpO2 97 %   Modality   Modality   (rollator)     Communicated with nurse prior to activity.   Min assist sit to stand, maintains sternal precautions.  Gait strong and steady. Denies SAN.  IS done.  Encouraged increased activity and use of IS.

## 2023-02-10 NOTE — NURSING
Nurses Note -- 4 Eyes      2/10/2023   8:06 AM      Skin assessed during: Q Shift Change      [x] No Pressure Injuries Present    [x]Prevention Measures Documented      [] Yes- Altered Skin Integrity Present or Discovered   [] LDA Added if Not in Epic (Describe Wound)   [] New Altered Skin Integrity was Present on Admit and Documented in LDA   [] Wound Image Taken    Wound Care Consulted? No    Attending Nurse:  Renzo Tuttle RN     Second RN/Staff Member:  Grace Lamar RN

## 2023-02-10 NOTE — PROGRESS NOTES
CT SURGERY PROGRESS NOTE  Flako Power  52 y.o.  1970    Patients Procedure: Procedure(s) (LRB):  REPLACEMENT, MITRAL VALVE (N/A)    Subjective  Interval History: POD 2    ROS    Medication List  Infusions   clevidipine Stopped (02/10/23 0143)    dexmedetomidine (PRECEDEX) infusion      dextrose 5 % and 0.45 % NaCl 100 mL/hr at 02/08/23 1130    EPINEPHrine      insulin regular 1 units/mL infusion orderable (CTS POST-OP) Stopped (02/09/23 0809)    loperamide       Scheduled   amLODIPine  5 mg Oral Daily    aspirin  81 mg Oral Daily    cefTRIAXone  2 g Intravenous Q12H    docusate sodium  100 mg Oral BID    enoxaparin  40 mg Subcutaneous Daily    folic acid  1 mg Oral Daily    losartan  25 mg Oral Daily    metoprolol tartrate  12.5 mg Oral BID    mupirocin   Nasal BID    sucralfate  1 g Oral QID (AC & HS)       Objective:  Recent Vitals:  Temp:  [98 °F (36.7 °C)-99.6 °F (37.6 °C)] 98.8 °F (37.1 °C)  Pulse:  [64-83] 83  Resp:  [15-46] 37  SpO2:  [86 %-100 %] 99 %  BP: ()/() 109/74  Arterial Line BP: ()/(55-85) 120/55    Physical Exam     I/O last 24 hrs:  Intake/Output - Last 3 Shifts         02/08 0700  02/09 0659 02/09 0700  02/10 0659 02/10 0700  02/11 0659    I.V. (mL/kg) 1341 (22.9) 703.4 (12.3)     Blood 654      IV Piggyback 1600 164.7     Total Intake(mL/kg) 3595 (61.5) 868.1 (15.2)     Urine (mL/kg/hr) 1935 (1.4) 1810 (1.3)     Emesis/NG output 0      Stool 0      Blood 1200      Chest Tube 40 80     Total Output 3175 1890     Net +420 -1021.9            Stool Occurrence 0 x      Emesis Occurrence 1 x              Labs  ABGs:   Recent Labs   Lab 02/08/23  1218   PH 7.35   PCO2 41   PO2 99   HCO3 22.6   POCSATURATED 97.3     BMP:   Recent Labs   Lab 02/08/23  1157 02/08/23  1651 02/10/23  0141      < > 137   K 3.6   < > 4.3   CO2 20*   < > 23   BUN 8.6   < > 10.7   CREATININE 0.79   < > 0.72*   CALCIUM 9.7   < > 8.6   MG 2.40  --   --     < > = values in this interval not  displayed.     CBC:   Recent Labs   Lab 02/10/23  0141   WBC 14.2*   RBC 3.37*   HGB 8.9*   HCT 26.8*      MCV 79.5*   MCH 26.4   MCHC 33.2     CMP:   Recent Labs   Lab 02/10/23  0141   CALCIUM 8.6      K 4.3   CO2 23   BUN 10.7   CREATININE 0.72*         Imaging:   CXR: X-Ray Chest AP Portable    Result Date: 2/10/2023  No adverse interval change. Electronically signed by: Warren Santacruz Date:    02/10/2023 Time:    06:26         ASSESSMENT/PLAN:    BP better controlled   Telem  DC drains pw soon    Case and plan of care discussed with MD Speedy Almonte PA-C

## 2023-02-10 NOTE — NURSING
Nurses Note -- 4 Eyes      2/10/2023   2:08 AM      Skin assessed during: Daily Assessment      [x] No Pressure Injuries Present    [x]Prevention Measures Documented      [] Yes- Altered Skin Integrity Present or Discovered   [] LDA Added if Not in Epic (Describe Wound)   [] New Altered Skin Integrity was Present on Admit and Documented in LDA   [] Wound Image Taken    Wound Care Consulted? No    Attending Nurse:  Grace Lamar RN     Second RN/Staff Member:  Allen Mckinney RN

## 2023-02-10 NOTE — PROGRESS NOTES
Pulmonary & Critical Care Medicine   Progress Note      Presenting History/HPI:  This is a 52-year-old male  with no significant past medical history  presented to [facility] ED  on 01/16/2023 with complaint of back pain. His  symptoms initially started in mid December 2022  with left knee pain and was seen at  at an urgent care and had left knees x-rays that were unremarkable and was prescribed NSAID,  subsequently started having back pain  and visited [facility] ED on 12/20/2022 and was diagnosed with lumbosacral strain and prescribed NSAID, tramadol and baclofen.  His mid lower back pain continued to get worse and over the past week started having nonproductive cough upon waking from sleep in the morning and had noticed that whenever he coughs his back pain worsens and radiates bilaterally to his abdomen.  Denies loss of bowel or bladder control or lower extremity weakness. Denies shortness breath, chest pain, fever or chills until he arrived to [facility] ED today on 1/6/2023 where he found to be febrile 102.7, tachycardic and hypertensive.  His labs notable for WBC 12.3, ESR 63, CRP 89.  COVID 19 and flu negative. CTA chest show no pulmonary embolus or parenchymal lung disease, irregularity of T8 inferior endplate may relate to degenerative change but cannot exclude discitis/osteomyelitis.  CT lumbar spine showed degenerative changes and spondylolisthesis at L5-S1. He was given IV fluid, ceftriaxone and vancomycin and  transferred to [facility] on 1/16/23 and referred to hospital medicine service for further evaluation and management.    He has subsequently been found on transesophageal echocardiogram to have severe mitral regurgitation with what appears to be a tethered anterior mitral valve leaflet.  There does not seem to be any evidence of vegetation, however, it is hard to say.  There was no evidence abscess or large valvular vegetation greater than 1 cm.  There is also no evidence of discrete embolization.  Blood cultures  have been positive for Gram-positive organisms and we await Infectious Disease recommendations.            Interval History:  -postoperative day 2 status post mitral valve replacement   -stopped Cleviprex for blood pressure control this morning, BP controlled at this time wit home antihypertensives.  -WBC 14.2K this AM from 13.1. Patient remains afebrile.   -Hgb 8.9 from 10 this AM   -epicardial leads and chest tube removed   -patient tolerating diet. States abdominal pain and nausea has improved.       Scheduled Medications:    amLODIPine  5 mg Oral Daily    aspirin  81 mg Oral Daily    cefTRIAXone  2 g Intravenous Q12H    docusate sodium  100 mg Oral BID    enoxaparin  40 mg Subcutaneous Daily    folic acid  1 mg Oral Daily    losartan  25 mg Oral Daily    metoprolol tartrate  12.5 mg Oral BID    mupirocin   Nasal BID    sucralfate  1 g Oral QID (AC & HS)       PRN Medications:   sodium chloride, albumin human 5%, calcium gluconate IVPB, calcium gluconate IVPB, calcium gluconate IVPB, dextrose 10%, dextrose 10%, hydrALAZINE, HYDROcodone-acetaminophen, lactulose 10 gram/15 ml, loperamide, magnesium sulfate IVPB, magnesium sulfate IVPB, metoclopramide HCl, morphine, ondansetron, oxyCODONE, potassium chloride in water, potassium chloride in water, potassium chloride in water, sodium phosphate IVPB, sodium phosphate IVPB, sodium phosphate IVPB      Infusions:     clevidipine Stopped (02/10/23 0143)    dexmedetomidine (PRECEDEX) infusion      dextrose 5 % and 0.45 % NaCl 100 mL/hr at 02/08/23 1130    EPINEPHrine      insulin regular 1 units/mL infusion orderable (CTS POST-OP) Stopped (02/09/23 0809)    loperamide           Fluid Balance:     Intake/Output Summary (Last 24 hours) at 2/10/2023 0909  Last data filed at 2/10/2023 0555  Gross per 24 hour   Intake 868.11 ml   Output 1460 ml   Net -591.89 ml             Vital Signs:   Vitals:    02/10/23 0800   BP:    Pulse:    Resp: 16   Temp:          Physical Exam  Vitals  and nursing note reviewed.   Constitutional:       General: He is not in acute distress.     Appearance: Normal appearance. He is not ill-appearing or toxic-appearing.   HENT:      Head: Normocephalic and atraumatic.      Right Ear: External ear normal.      Left Ear: External ear normal.      Nose: Nose normal.      Mouth/Throat:      Mouth: Mucous membranes are moist.      Pharynx: Oropharynx is clear. No oropharyngeal exudate or posterior oropharyngeal erythema.   Eyes:      General: No scleral icterus.     Extraocular Movements: Extraocular movements intact.      Conjunctiva/sclera: Conjunctivae normal.      Pupils: Pupils are equal, round, and reactive to light.   Neck:      Vascular: No carotid bruit.   Cardiovascular:      Rate and Rhythm: Normal rate and regular rhythm.      Pulses: Normal pulses.      Heart sounds: Normal heart sounds. No murmur heard.    No friction rub. No gallop.   Pulmonary:      Effort: Pulmonary effort is normal. No respiratory distress.      Breath sounds: Normal breath sounds. No wheezing, rhonchi or rales.   Abdominal:      General: Abdomen is flat. Bowel sounds are normal. There is no distension.      Palpations: Abdomen is soft.      Tenderness: There is no abdominal tenderness. There is no guarding or rebound.   Genitourinary:     Comments: deferred  Musculoskeletal:         General: No swelling or deformity. Normal range of motion.      Cervical back: Normal range of motion and neck supple. No rigidity or tenderness.   Lymphadenopathy:      Cervical: No cervical adenopathy.   Skin:     General: Skin is warm and dry.      Capillary Refill: Capillary refill takes less than 2 seconds.      Coloration: Skin is not jaundiced.      Findings: No bruising, lesion or rash.   Neurological:      General: No focal deficit present.      Mental Status: He is alert.      Sensory: No sensory deficit.      Motor: No weakness.   Psychiatric:         Mood and Affect: Mood normal.       Laboratory  Studies:   No results for input(s): PH, PCO2, PO2, HCO3, POCSATURATED, BE in the last 24 hours.  Recent Labs   Lab 02/10/23  0141   WBC 14.2*   RBC 3.37*   HGB 8.9*   HCT 26.8*      MCV 79.5*   MCH 26.4   MCHC 33.2       Recent Labs   Lab 02/10/23  0141   GLUCOSE 132*      K 4.3   CO2 23   BUN 10.7   CREATININE 0.72*           Microbiology Data:   Microbiology Results (last 7 days)       Procedure Component Value Units Date/Time    Tissue Culture - Aerobic [[id number]] Collected: 02/08/23 0931    Order Status: Completed Specimen: Tissue from Heart Valve Updated: 02/10/23 0849     CULTURE, TISSUE- AEROBIC ([facility]) No Growth At 48 Hours    Anaerobic Culture [[id number]] Collected: 02/08/23 0931    Order Status: Completed Specimen: Tissue from Heart Valve Updated: 02/10/23 0810     Anaerobe Culture No Anaerobes Isolated    Blood Culture [[id number]]  (Normal) Collected: 02/08/23 0558    Order Status: Completed Specimen: Blood from Arm, Left Updated: 02/10/23 0700     CULTURE, BLOOD ([facility]) No Growth At 48 Hours    Blood Culture [[id number]]  (Normal) Collected: 02/08/23 0604    Order Status: Completed Specimen: Blood from Hand, Left Updated: 02/10/23 0700     CULTURE, BLOOD ([facility]) No Growth At 48 Hours              Imaging:   X-Ray Chest AP Portable  Narrative: EXAMINATION:  XR CHEST AP PORTABLE    CLINICAL HISTORY:  Post-op;    TECHNIQUE:  Single view of the chest    COMPARISON:  02/09/2023    FINDINGS:  Postsurgical changes of median sternotomy remain.  No focal opacification.  Impression: No adverse interval change.    Electronically signed by: Warren Santacruz  Date:    02/10/2023  Time:    06:26          Assessment and Plan    Assessment:  Postoperative day 2 status post mechanical mitral valve replacement   Subacute bacterial endocarditis   History of diskitis  Hypertension          Plan:  -discontinued lines tubes medications per CV surgery protocol   -weaned Cleviprex overnight, currently controlled on home  oral medications. Blood pressure goal of 140 systolic.   -antibiotics to continue per current regimen for the above infections  -awaiting Infectious Disease recommendations        Tran Sanford MD  2/10/2023  Pulmonology/Critical Care

## 2023-02-11 LAB
ANION GAP SERPL CALC-SCNC: 10 MEQ/L
BACTERIA SPEC ANAEROBE CULT: NORMAL
BASOPHILS # BLD AUTO: 0.04 X10(3)/MCL (ref 0–0.2)
BASOPHILS NFR BLD AUTO: 0.4 %
BUN SERPL-MCNC: 13.2 MG/DL (ref 8.4–25.7)
CALCIUM SERPL-MCNC: 8.5 MG/DL (ref 8.4–10.2)
CHLORIDE SERPL-SCNC: 106 MMOL/L (ref 98–107)
CO2 SERPL-SCNC: 25 MMOL/L (ref 22–29)
CREAT SERPL-MCNC: 0.68 MG/DL (ref 0.73–1.18)
CREAT/UREA NIT SERPL: 19
EOSINOPHIL # BLD AUTO: 0.01 X10(3)/MCL (ref 0–0.9)
EOSINOPHIL NFR BLD AUTO: 0.1 %
ERYTHROCYTE [DISTWIDTH] IN BLOOD BY AUTOMATED COUNT: 15.8 % (ref 11.5–17)
GFR SERPLBLD CREATININE-BSD FMLA CKD-EPI: >60 MLS/MIN/1.73/M2
GLUCOSE SERPL-MCNC: 104 MG/DL (ref 74–100)
HCT VFR BLD AUTO: 26.4 % (ref 42–52)
HGB BLD-MCNC: 8.8 GM/DL (ref 14–18)
IMM GRANULOCYTES # BLD AUTO: 0.05 X10(3)/MCL (ref 0–0.04)
IMM GRANULOCYTES NFR BLD AUTO: 0.4 %
INR BLD: 1.27 (ref 0–1.3)
LYMPHOCYTES # BLD AUTO: 0.81 X10(3)/MCL (ref 0.6–4.6)
LYMPHOCYTES NFR BLD AUTO: 7.3 %
MCH RBC QN AUTO: 26.7 PG
MCHC RBC AUTO-ENTMCNC: 33.3 MG/DL (ref 33–36)
MCV RBC AUTO: 80.2 FL (ref 80–94)
MONOCYTES # BLD AUTO: 1.27 X10(3)/MCL (ref 0.1–1.3)
MONOCYTES NFR BLD AUTO: 11.4 %
NEUTROPHILS # BLD AUTO: 8.94 X10(3)/MCL (ref 2.1–9.2)
NEUTROPHILS NFR BLD AUTO: 80.4 %
NRBC BLD AUTO-RTO: 0 %
PLATELET # BLD AUTO: 289 X10(3)/MCL (ref 130–400)
PMV BLD AUTO: 10.4 FL (ref 7.4–10.4)
POCT GLUCOSE: 100 MG/DL (ref 70–110)
POCT GLUCOSE: 132 MG/DL (ref 70–110)
POCT GLUCOSE: 98 MG/DL (ref 70–110)
POTASSIUM SERPL-SCNC: 3.6 MMOL/L (ref 3.5–5.1)
PROTHROMBIN TIME: 15.8 SECONDS (ref 12.5–14.5)
RBC # BLD AUTO: 3.29 X10(6)/MCL (ref 4.7–6.1)
SODIUM SERPL-SCNC: 141 MMOL/L (ref 136–145)
WBC # SPEC AUTO: 11.1 X10(3)/MCL (ref 4.5–11.5)

## 2023-02-11 PROCEDURE — 94760 N-INVAS EAR/PLS OXIMETRY 1: CPT

## 2023-02-11 PROCEDURE — 99024 PR POST-OP FOLLOW-UP VISIT: ICD-10-PCS | Mod: ,,, | Performed by: PHYSICIAN ASSISTANT

## 2023-02-11 PROCEDURE — 21400001 HC TELEMETRY ROOM

## 2023-02-11 PROCEDURE — 97110 THERAPEUTIC EXERCISES: CPT

## 2023-02-11 PROCEDURE — 99024 POSTOP FOLLOW-UP VISIT: CPT | Mod: ,,, | Performed by: PHYSICIAN ASSISTANT

## 2023-02-11 PROCEDURE — 85610 PROTHROMBIN TIME: CPT | Performed by: PHYSICIAN ASSISTANT

## 2023-02-11 PROCEDURE — 80048 BASIC METABOLIC PNL TOTAL CA: CPT | Performed by: PHYSICIAN ASSISTANT

## 2023-02-11 PROCEDURE — 63600175 PHARM REV CODE 636 W HCPCS: Performed by: PHYSICIAN ASSISTANT

## 2023-02-11 PROCEDURE — 25000003 PHARM REV CODE 250: Performed by: PHYSICIAN ASSISTANT

## 2023-02-11 PROCEDURE — 85025 COMPLETE CBC W/AUTO DIFF WBC: CPT | Performed by: PHYSICIAN ASSISTANT

## 2023-02-11 RX ADMIN — METOPROLOL TARTRATE 12.5 MG: 25 TABLET, FILM COATED ORAL at 09:02

## 2023-02-11 RX ADMIN — CEFTRIAXONE SODIUM 2 G: 2 INJECTION, POWDER, FOR SOLUTION INTRAMUSCULAR; INTRAVENOUS at 03:02

## 2023-02-11 RX ADMIN — ENOXAPARIN SODIUM 40 MG: 40 INJECTION SUBCUTANEOUS at 04:02

## 2023-02-11 RX ADMIN — MUPIROCIN: 20 OINTMENT TOPICAL at 10:02

## 2023-02-11 RX ADMIN — HYDROCODONE BITARTRATE AND ACETAMINOPHEN 1 TABLET: 5; 325 TABLET ORAL at 09:02

## 2023-02-11 RX ADMIN — WARFARIN SODIUM 5 MG: 5 TABLET ORAL at 04:02

## 2023-02-11 RX ADMIN — ASPIRIN 81 MG: 81 TABLET, COATED ORAL at 09:02

## 2023-02-11 RX ADMIN — DOCUSATE SODIUM 100 MG: 100 CAPSULE, LIQUID FILLED ORAL at 09:02

## 2023-02-11 RX ADMIN — MUPIROCIN: 20 OINTMENT TOPICAL at 09:02

## 2023-02-11 RX ADMIN — AMLODIPINE BESYLATE 5 MG: 5 TABLET ORAL at 10:02

## 2023-02-11 RX ADMIN — FOLIC ACID 1 MG: 1 TABLET ORAL at 09:02

## 2023-02-11 RX ADMIN — LOSARTAN POTASSIUM 25 MG: 25 TABLET, FILM COATED ORAL at 09:02

## 2023-02-11 RX ADMIN — LACTULOSE 20 G: 10 SOLUTION ORAL at 10:02

## 2023-02-11 NOTE — PROGRESS NOTES
"Flako Power is a 52 y.o. male patient.   1. 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 Severe mitral valve regurgitation    3. Infective endocarditis of mitral valve    4. CAD (coronary artery disease)    5. Endocarditis of mitral valve    6. Discitis    7. Bacteremia      Past Medical History:   Diagnosis Date    Cough     Hypertension     Infective endocarditis     Mitral incompetence     PICC (peripherally inserted central catheter) in place      No past surgical history pertinent negatives on file.  Scheduled Meds:   amLODIPine  5 mg Oral Daily    aspirin  81 mg Oral Daily    cefTRIAXone  2 g Intravenous Q12H    docusate sodium  100 mg Oral BID    enoxaparin  40 mg Subcutaneous Daily    folic acid  1 mg Oral Daily    losartan  25 mg Oral Daily    metoprolol tartrate  12.5 mg Oral BID    mupirocin   Nasal BID    warfarin  5 mg Oral Daily     Continuous Infusions:   dexmedetomidine (PRECEDEX) infusion      dextrose 5 % and 0.45 % NaCl 100 mL/hr at 02/08/23 1130    insulin regular 1 units/mL infusion orderable (CTS POST-OP) Stopped (02/09/23 0809)    loperamide       PRN Meds:sodium chloride, albumin human 5%, calcium gluconate IVPB, calcium gluconate IVPB, calcium gluconate IVPB, dextrose 10%, dextrose 10%, hydrALAZINE, HYDROcodone-acetaminophen, lactulose 10 gram/15 ml, loperamide, magnesium sulfate IVPB, magnesium sulfate IVPB, metoclopramide HCl, morphine, ondansetron, oxyCODONE, potassium chloride in water, potassium chloride in water, potassium chloride in water, sodium phosphate IVPB, sodium phosphate IVPB, sodium phosphate IVPB    Review of patient's allergies indicates:  No Known Allergies  There are no hospital problems to display for this patient.    Blood pressure 130/78, pulse 76, temperature 99.1 °F (37.3 °C), temperature source Oral, resp. rate 18, height 4' 11" (1.499 m), weight 56.9 kg (125 lb 7.1 oz), SpO2 96 %.    Subjective:    POD #3  Awake. Alert.  Sitting up " in chair  Transferred to floor last night      Objective:   AFVSS  Heart: RRR  Lungs respirations nonlabored, clear  Incision: c/d/i      Assesment/Plan:    S/p MVR  Increase activity  Ambulate  IS            ARNOL Gonzalez  2/11/2023

## 2023-02-11 NOTE — NURSING
Nurses Note -- 4 Eyes      2/10/2023   8:14 PM      Skin assessed during: Transfer      [x] No Pressure Injuries Present    []Prevention Measures Documented      [] Yes- Altered Skin Integrity Present or Discovered   [] LDA Added if Not in Epic (Describe Wound)   [] New Altered Skin Integrity was Present on Admit and Documented in LDA   [] Wound Image Taken    Wound Care Consulted? No    Attending Nurse:  Keyonna Ocasio RN     Second RN/Staff Member:  Lauri Greenberg RN

## 2023-02-12 LAB
ANION GAP SERPL CALC-SCNC: 9 MEQ/L
BASOPHILS # BLD AUTO: 0.05 X10(3)/MCL (ref 0–0.2)
BASOPHILS NFR BLD AUTO: 0.6 %
BUN SERPL-MCNC: 10.4 MG/DL (ref 8.4–25.7)
CALCIUM SERPL-MCNC: 8.5 MG/DL (ref 8.4–10.2)
CHLORIDE SERPL-SCNC: 105 MMOL/L (ref 98–107)
CO2 SERPL-SCNC: 27 MMOL/L (ref 22–29)
CREAT SERPL-MCNC: 0.71 MG/DL (ref 0.73–1.18)
CREAT/UREA NIT SERPL: 15
EOSINOPHIL # BLD AUTO: 0.04 X10(3)/MCL (ref 0–0.9)
EOSINOPHIL NFR BLD AUTO: 0.5 %
ERYTHROCYTE [DISTWIDTH] IN BLOOD BY AUTOMATED COUNT: 15.8 % (ref 11.5–17)
GFR SERPLBLD CREATININE-BSD FMLA CKD-EPI: >60 MLS/MIN/1.73/M2
GLUCOSE SERPL-MCNC: 86 MG/DL (ref 74–100)
HCT VFR BLD AUTO: 25.8 % (ref 42–52)
HGB BLD-MCNC: 8.5 GM/DL (ref 14–18)
IMM GRANULOCYTES # BLD AUTO: 0.04 X10(3)/MCL (ref 0–0.04)
IMM GRANULOCYTES NFR BLD AUTO: 0.5 %
INR BLD: 1.24 (ref 0–1.3)
INR BLD: 1.31 (ref 0–1.3)
LYMPHOCYTES # BLD AUTO: 1.21 X10(3)/MCL (ref 0.6–4.6)
LYMPHOCYTES NFR BLD AUTO: 14.5 %
MCH RBC QN AUTO: 26.5 PG
MCHC RBC AUTO-ENTMCNC: 32.9 MG/DL (ref 33–36)
MCV RBC AUTO: 80.4 FL (ref 80–94)
MONOCYTES # BLD AUTO: 0.95 X10(3)/MCL (ref 0.1–1.3)
MONOCYTES NFR BLD AUTO: 11.4 %
NEUTROPHILS # BLD AUTO: 6.06 X10(3)/MCL (ref 2.1–9.2)
NEUTROPHILS NFR BLD AUTO: 72.5 %
NRBC BLD AUTO-RTO: 0 %
PLATELET # BLD AUTO: 289 X10(3)/MCL (ref 130–400)
PMV BLD AUTO: 10.1 FL (ref 7.4–10.4)
POCT GLUCOSE: 103 MG/DL (ref 70–110)
POCT GLUCOSE: 104 MG/DL (ref 70–110)
POTASSIUM SERPL-SCNC: 3.6 MMOL/L (ref 3.5–5.1)
PROTHROMBIN TIME: 15.4 SECONDS (ref 12.5–14.5)
PROTHROMBIN TIME: 16.1 SECONDS (ref 12.5–14.5)
RBC # BLD AUTO: 3.21 X10(6)/MCL (ref 4.7–6.1)
SODIUM SERPL-SCNC: 141 MMOL/L (ref 136–145)
WBC # SPEC AUTO: 8.4 X10(3)/MCL (ref 4.5–11.5)

## 2023-02-12 PROCEDURE — 21400001 HC TELEMETRY ROOM

## 2023-02-12 PROCEDURE — 25000003 PHARM REV CODE 250: Performed by: PHYSICIAN ASSISTANT

## 2023-02-12 PROCEDURE — 85610 PROTHROMBIN TIME: CPT | Performed by: PHYSICIAN ASSISTANT

## 2023-02-12 PROCEDURE — 97110 THERAPEUTIC EXERCISES: CPT

## 2023-02-12 PROCEDURE — 94760 N-INVAS EAR/PLS OXIMETRY 1: CPT

## 2023-02-12 PROCEDURE — 80048 BASIC METABOLIC PNL TOTAL CA: CPT | Performed by: PHYSICIAN ASSISTANT

## 2023-02-12 PROCEDURE — 85025 COMPLETE CBC W/AUTO DIFF WBC: CPT | Performed by: PHYSICIAN ASSISTANT

## 2023-02-12 PROCEDURE — 63600175 PHARM REV CODE 636 W HCPCS: Performed by: PHYSICIAN ASSISTANT

## 2023-02-12 RX ADMIN — WARFARIN SODIUM 5 MG: 5 TABLET ORAL at 05:02

## 2023-02-12 RX ADMIN — ENOXAPARIN SODIUM 40 MG: 40 INJECTION SUBCUTANEOUS at 05:02

## 2023-02-12 RX ADMIN — METOPROLOL TARTRATE 12.5 MG: 25 TABLET, FILM COATED ORAL at 09:02

## 2023-02-12 RX ADMIN — CEFTRIAXONE SODIUM 2 G: 2 INJECTION, POWDER, FOR SOLUTION INTRAMUSCULAR; INTRAVENOUS at 03:02

## 2023-02-12 RX ADMIN — LOSARTAN POTASSIUM 25 MG: 25 TABLET, FILM COATED ORAL at 09:02

## 2023-02-12 RX ADMIN — MUPIROCIN: 20 OINTMENT TOPICAL at 09:02

## 2023-02-12 RX ADMIN — AMLODIPINE BESYLATE 5 MG: 5 TABLET ORAL at 09:02

## 2023-02-12 RX ADMIN — MUPIROCIN: 20 OINTMENT TOPICAL at 08:02

## 2023-02-12 RX ADMIN — METOPROLOL TARTRATE 12.5 MG: 25 TABLET, FILM COATED ORAL at 08:02

## 2023-02-12 RX ADMIN — FOLIC ACID 1 MG: 1 TABLET ORAL at 09:02

## 2023-02-12 RX ADMIN — DOCUSATE SODIUM 100 MG: 100 CAPSULE, LIQUID FILLED ORAL at 08:02

## 2023-02-12 RX ADMIN — DOCUSATE SODIUM 100 MG: 100 CAPSULE, LIQUID FILLED ORAL at 09:02

## 2023-02-12 RX ADMIN — ASPIRIN 81 MG: 81 TABLET, COATED ORAL at 09:02

## 2023-02-12 NOTE — PROGRESS NOTES
"Flako Power is a 52 y.o. male patient.   1. 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 Severe mitral valve regurgitation    3. Infective endocarditis of mitral valve    4. CAD (coronary artery disease)    5. Endocarditis of mitral valve    6. Discitis    7. Bacteremia      Past Medical History:   Diagnosis Date    Cough     Hypertension     Infective endocarditis     Mitral incompetence     PICC (peripherally inserted central catheter) in place      No past surgical history pertinent negatives on file.  Scheduled Meds:   amLODIPine  5 mg Oral Daily    aspirin  81 mg Oral Daily    cefTRIAXone  2 g Intravenous Q12H    docusate sodium  100 mg Oral BID    enoxaparin  40 mg Subcutaneous Daily    folic acid  1 mg Oral Daily    losartan  25 mg Oral Daily    metoprolol tartrate  12.5 mg Oral BID    mupirocin   Nasal BID    warfarin  5 mg Oral Daily     Continuous Infusions:   dexmedetomidine (PRECEDEX) infusion      dextrose 5 % and 0.45 % NaCl 100 mL/hr at 02/08/23 1130    insulin regular 1 units/mL infusion orderable (CTS POST-OP) Stopped (02/09/23 0809)    loperamide       PRN Meds:sodium chloride, albumin human 5%, calcium gluconate IVPB, calcium gluconate IVPB, calcium gluconate IVPB, dextrose 10%, dextrose 10%, hydrALAZINE, HYDROcodone-acetaminophen, lactulose 10 gram/15 ml, loperamide, magnesium sulfate IVPB, magnesium sulfate IVPB, metoclopramide HCl, morphine, ondansetron, oxyCODONE, potassium chloride in water, potassium chloride in water, potassium chloride in water, sodium phosphate IVPB, sodium phosphate IVPB, sodium phosphate IVPB    Review of patient's allergies indicates:  No Known Allergies  There are no hospital problems to display for this patient.    Blood pressure 123/77, pulse 76, temperature 99 °F (37.2 °C), temperature source Oral, resp. rate (!) 21, height 4' 11" (1.499 m), weight 56.9 kg (125 lb 7.1 oz), SpO2 96 %.      Subjective:    POD #4  Awake. " Alert.  Sitting up in chair  On Coumadin 5mg     Objective:   AFVSS  Heart: RRR  Lungs respirations nonlabored, clear  Incision: c/d/I  INR pending     Assesment/Plan:    S/p mechanical MVR  Increase activity  Ambulate  IS  Await therapeutic INR        ARNOL Gonzalez  2/12/2023

## 2023-02-12 NOTE — PROGRESS NOTES
02/12/23 0820   Pre Exercise Vitals   /63   Pulse 84  (SR)   Supplemental O2? No   SpO2 95 %   During Exercise Vitals   Pulse 116   Supplemental O2? No   SpO2 94 %   Distance Walked 400 feet   Post Exercise Vitals   /59   Pulse 102   Supplemental O2? No   SpO2 95 %   Modality   Modality Independent     Communicated with nurse prior to activity.   Min assist sit to stand, maintains sternal precautions.  Gait steady.  Encouraged increased activity and use of IS.

## 2023-02-12 NOTE — PROGRESS NOTES
02/12/23 1634   Pre Exercise Vitals   BP   (already up in room)   Pulse 91   Supplemental O2? No   SpO2 97 %   During Exercise Vitals   Pulse 100   Supplemental O2? No   SpO2 91 %   Distance Walked 400 feet   Post Exercise Vitals   /84   Pulse 101   Supplemental O2? No   SpO2 95 %   Modality   Modality Independent     Communicated with nurse prior to activity.   Independent, maintains sternal precautions.  Gait steady.  Encouraged increased activity and use of IS.

## 2023-02-13 LAB
BACTERIA BLD CULT: NORMAL
BACTERIA BLD CULT: NORMAL
BACTERIA SPEC CULT: NO GROWTH
GLUCOSE SERPL-MCNC: 77 MG/DL (ref 70–110)
INR BLD: 1.3 (ref 0–1.3)
POCT GLUCOSE: 133 MG/DL (ref 70–110)
PROTHROMBIN TIME: 16 SECONDS (ref 12.5–14.5)

## 2023-02-13 PROCEDURE — 94760 N-INVAS EAR/PLS OXIMETRY 1: CPT

## 2023-02-13 PROCEDURE — 21400001 HC TELEMETRY ROOM

## 2023-02-13 PROCEDURE — 25000003 PHARM REV CODE 250: Performed by: PHYSICIAN ASSISTANT

## 2023-02-13 PROCEDURE — 99024 POSTOP FOLLOW-UP VISIT: CPT | Mod: ,,, | Performed by: PHYSICIAN ASSISTANT

## 2023-02-13 PROCEDURE — 99024 PR POST-OP FOLLOW-UP VISIT: ICD-10-PCS | Mod: ,,, | Performed by: PHYSICIAN ASSISTANT

## 2023-02-13 PROCEDURE — 97110 THERAPEUTIC EXERCISES: CPT

## 2023-02-13 PROCEDURE — 63600175 PHARM REV CODE 636 W HCPCS: Performed by: PHYSICIAN ASSISTANT

## 2023-02-13 RX ADMIN — ASPIRIN 81 MG: 81 TABLET, COATED ORAL at 08:02

## 2023-02-13 RX ADMIN — FOLIC ACID 1 MG: 1 TABLET ORAL at 08:02

## 2023-02-13 RX ADMIN — METOPROLOL TARTRATE 12.5 MG: 25 TABLET, FILM COATED ORAL at 09:02

## 2023-02-13 RX ADMIN — CEFTRIAXONE SODIUM 2 G: 2 INJECTION, POWDER, FOR SOLUTION INTRAMUSCULAR; INTRAVENOUS at 03:02

## 2023-02-13 RX ADMIN — HYDROCODONE BITARTRATE AND ACETAMINOPHEN 1 TABLET: 5; 325 TABLET ORAL at 09:02

## 2023-02-13 RX ADMIN — DOCUSATE SODIUM 100 MG: 100 CAPSULE, LIQUID FILLED ORAL at 08:02

## 2023-02-13 RX ADMIN — ENOXAPARIN SODIUM 40 MG: 40 INJECTION SUBCUTANEOUS at 06:02

## 2023-02-13 RX ADMIN — AMLODIPINE BESYLATE 5 MG: 5 TABLET ORAL at 08:02

## 2023-02-13 RX ADMIN — WARFARIN SODIUM 7.5 MG: 2.5 TABLET ORAL at 06:02

## 2023-02-13 RX ADMIN — METOPROLOL TARTRATE 12.5 MG: 25 TABLET, FILM COATED ORAL at 08:02

## 2023-02-13 RX ADMIN — LOSARTAN POTASSIUM 25 MG: 25 TABLET, FILM COATED ORAL at 08:02

## 2023-02-13 NOTE — ANESTHESIA POSTPROCEDURE EVALUATION
Anesthesia Post Evaluation    Patient: Flako Power    Procedure(s) Performed: Procedure(s) (LRB):  REPLACEMENT, MITRAL VALVE (N/A)    Final Anesthesia Type: general      Patient location during evaluation: ICU  Patient participation: Yes- Able to Participate  Level of consciousness: awake and alert  Post-procedure vital signs: reviewed and stable  Pain management: adequate  Airway patency: patent      Anesthetic complications: no      Cardiovascular status: hemodynamically stable  Respiratory status: unassisted  Hydration status: euvolemic  Follow-up not needed.          Vitals Value Taken Time   /74 02/13/23 1117   Temp 37.2 °C (98.9 °F) 02/13/23 1117   Pulse 78 02/13/23 1117   Resp 20 02/13/23 0356   SpO2 96 % 02/13/23 1117         No case tracking events are documented in the log.      Pain/Chen Score: No data recorded

## 2023-02-13 NOTE — PROGRESS NOTES
02/13/23 0930   Pre Exercise Vitals   /84   Pulse 90   Supplemental O2? No   SpO2 96 %   During Exercise Vitals   Pulse 102   Supplemental O2? No   SpO2 96 %   Distance Walked 500 feet   Post Exercise Vitals   /85   Pulse 100   Supplemental O2? No   SpO2 99 %   Modality   Modality Walker     Standby assist only. Sternal precautions maintained. Gait steady and strong. Communicated with nurse pre and post walk.

## 2023-02-13 NOTE — PROGRESS NOTES
CT SURGERY PROGRESS NOTE  Flako Power  52 y.o.  1970    Patients Procedure: Procedure(s) (LRB):  REPLACEMENT, MITRAL VALVE (N/A)    Subjective  Interval History: POD 5    ROS    Medication List  Infusions   dexmedetomidine (PRECEDEX) infusion      dextrose 5 % and 0.45 % NaCl 100 mL/hr at 02/08/23 1130    insulin regular 1 units/mL infusion orderable (CTS POST-OP) Stopped (02/09/23 0809)    loperamide       Scheduled   amLODIPine  5 mg Oral Daily    aspirin  81 mg Oral Daily    cefTRIAXone  2 g Intravenous Q12H    docusate sodium  100 mg Oral BID    enoxaparin  40 mg Subcutaneous Daily    folic acid  1 mg Oral Daily    losartan  25 mg Oral Daily    metoprolol tartrate  12.5 mg Oral BID    warfarin  5 mg Oral Daily       Objective:  Recent Vitals:  Temp:  [98.4 °F (36.9 °C)-99.2 °F (37.3 °C)] 98.9 °F (37.2 °C)  Pulse:  [78-87] 78  Resp:  [18-20] 20  SpO2:  [95 %-97 %] 96 %  BP: (108-149)/(70-83) 108/74    Physical Exam     I/O last 24 hrs:  Intake/Output - Last 3 Shifts         02/11 0700  02/12 0659 02/12 0700  02/13 0659 02/13 0700  02/14 0659    P.O. 840 840     I.V. (mL/kg)       IV Piggyback       Total Intake(mL/kg) 840 (14.8) 840 (15.2)     Urine (mL/kg/hr)       Total Output       Net +840 +840            Urine Occurrence 6 x 2 x     Stool Occurrence 1 x              Labs  BMP:   Recent Labs   Lab 02/12/23  0334      K 3.6   CO2 27   BUN 10.4   CREATININE 0.71*   CALCIUM 8.5     CBC:   Recent Labs   Lab 02/12/23  0334   WBC 8.4   RBC 3.21*   HGB 8.5*   HCT 25.8*      MCV 80.4   MCH 26.5   MCHC 32.9*     CMP:   Recent Labs   Lab 02/12/23  0334   CALCIUM 8.5      K 3.6   CO2 27   BUN 10.4   CREATININE 0.71*     Coagulation:   Recent Labs   Lab 02/12/23  2341   INR 1.30         Imaging:   CXR: No results found in the last 24 hours.        ASSESSMENT/PLAN:    On coumadin 5 daily- INR is 1.3.  Will increase to 7.5 daily  Cont lovenos until INR is therapeutic  Mobilize  DC planning      Case and plan of care discussed with MD Speedy Almonte PA-C

## 2023-02-14 LAB
INR BLD: 1.4 (ref 0–1.3)
POCT GLUCOSE: 117 MG/DL (ref 70–110)
POCT GLUCOSE: 79 MG/DL (ref 70–110)
POCT GLUCOSE: 79 MG/DL (ref 70–110)
POCT GLUCOSE: 90 MG/DL (ref 70–110)
PROTHROMBIN TIME: 17 SECONDS (ref 12.5–14.5)

## 2023-02-14 PROCEDURE — 99024 POSTOP FOLLOW-UP VISIT: CPT | Mod: ,,, | Performed by: PHYSICIAN ASSISTANT

## 2023-02-14 PROCEDURE — 63600175 PHARM REV CODE 636 W HCPCS: Performed by: PHYSICIAN ASSISTANT

## 2023-02-14 PROCEDURE — 97110 THERAPEUTIC EXERCISES: CPT

## 2023-02-14 PROCEDURE — 25000003 PHARM REV CODE 250: Performed by: PHYSICIAN ASSISTANT

## 2023-02-14 PROCEDURE — 99024 PR POST-OP FOLLOW-UP VISIT: ICD-10-PCS | Mod: ,,, | Performed by: PHYSICIAN ASSISTANT

## 2023-02-14 PROCEDURE — 21400001 HC TELEMETRY ROOM

## 2023-02-14 PROCEDURE — 94760 N-INVAS EAR/PLS OXIMETRY 1: CPT

## 2023-02-14 PROCEDURE — 85610 PROTHROMBIN TIME: CPT | Performed by: PHYSICIAN ASSISTANT

## 2023-02-14 RX ORDER — ENOXAPARIN SODIUM 100 MG/ML
1 INJECTION SUBCUTANEOUS
Status: DISCONTINUED | OUTPATIENT
Start: 2023-02-14 | End: 2023-02-16

## 2023-02-14 RX ADMIN — WARFARIN SODIUM 7.5 MG: 2.5 TABLET ORAL at 04:02

## 2023-02-14 RX ADMIN — METOPROLOL TARTRATE 12.5 MG: 25 TABLET, FILM COATED ORAL at 09:02

## 2023-02-14 RX ADMIN — FOLIC ACID 1 MG: 1 TABLET ORAL at 09:02

## 2023-02-14 RX ADMIN — CEFTRIAXONE SODIUM 2 G: 2 INJECTION, POWDER, FOR SOLUTION INTRAMUSCULAR; INTRAVENOUS at 02:02

## 2023-02-14 RX ADMIN — ASPIRIN 81 MG: 81 TABLET, COATED ORAL at 09:02

## 2023-02-14 RX ADMIN — METOPROLOL TARTRATE 12.5 MG: 25 TABLET, FILM COATED ORAL at 08:02

## 2023-02-14 RX ADMIN — DOCUSATE SODIUM 100 MG: 100 CAPSULE, LIQUID FILLED ORAL at 08:02

## 2023-02-14 RX ADMIN — AMLODIPINE BESYLATE 5 MG: 5 TABLET ORAL at 09:02

## 2023-02-14 RX ADMIN — DOCUSATE SODIUM 100 MG: 100 CAPSULE, LIQUID FILLED ORAL at 09:02

## 2023-02-14 RX ADMIN — LOSARTAN POTASSIUM 25 MG: 25 TABLET, FILM COATED ORAL at 09:02

## 2023-02-14 RX ADMIN — CEFTRIAXONE SODIUM 2 G: 2 INJECTION, POWDER, FOR SOLUTION INTRAMUSCULAR; INTRAVENOUS at 01:02

## 2023-02-14 RX ADMIN — ENOXAPARIN SODIUM 60 MG: 100 INJECTION SUBCUTANEOUS at 04:02

## 2023-02-14 NOTE — PROGRESS NOTES
"Inpatient Nutrition Evaluation    Admit Date: 2/8/2023   Total duration of encounter: 6 days    Nutrition Recommendation/Prescription     Continue heart healthy, 2000 kcal, low sodium diet as tolerated   RD to continue Boost Plus Chocolate BID to provide an additional 360 kcal and 14 g protein per container  RD to monitor po intake and weight changes      Nutrition Assessment     Chart Review    Reason Seen: length of stay    Malnutrition Screening Tool Results   Have you recently lost weight without trying?: No  Have you been eating poorly because of a decreased appetite?: No   MST Score: 0     Diagnosis:  POD 6 MVR  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   Bacteremia    Relevant Medical History: severe mitral valve regurgitation, CAD, discitis    Nutrition-Related Medications: dextrose 5% and NaCl infusion, Colace BID, folic acid daily, Zofran PRN, KCl PRN, coumadin daily    Nutrition-Related Labs: 2/14: n/a      Diet Order: Diet heart healthy 2000 kcal, Low Sodium  Oral Supplement Order: Boost Plus  Appetite/Oral Intake: good/% of meals  Factors Affecting Nutritional Intake: none identified  Food/Yazidi/Cultural Preferences: none reported  Food Allergies: none reported    Skin Integrity: incision  Wound(s):       Comments    2/14: pt reports good appetite, eating 100% of breakfast this AM and dinner last night. Pt drinks boost when appetite is decreased, will continue order at this time. Good appetite prior admission. No n/v/d/c reported at this time. Pt reports no weight loss prior admission. Weight of 55 kg (121 lb) on 2/14 and admit weight of 120 lb on 2/8. Weight fluctuation noted per previous weights, likely fluid related. Will continue to monitor.    Anthropometrics    Height: 4' 11" (149.9 cm) Height Method: Stated  Last Weight: 55 kg (121 lb 4.1 oz) (02/14/23 0638) Weight Method: Standard Scale  BMI (Calculated): 24.5  BMI Classification: normal (BMI 18.5-24.9)      "   Ideal Body Weight (IBW), Male: 100 lb     % Ideal Body Weight, Male (lb): 120.15 %                          Usual Weight Provided By: EMR weight history    Wt Readings from Last 3 Encounters:   02/14/23 0638 55 kg (121 lb 4.1 oz)   02/13/23 0628 55.3 kg (121 lb 14.6 oz)   02/11/23 0638 56.9 kg (125 lb 7.1 oz)   02/10/23 0555 57.2 kg (126 lb 1.7 oz)   02/09/23 0600 58.5 kg (129 lb)   02/08/23 0521 54.5 kg (120 lb 2.4 oz)   02/01/23 1500 56.7 kg (125 lb)   01/30/23 1156 55.8 kg (123 lb)   01/17/23 0114 57.6 kg (127 lb)   01/16/23 2300 57.6 kg (127 lb)      Weight Change(s) Since Admission:  Admit Weight: 56.7 kg (125 lb) (02/01/23 1500)      Patient Education    Not applicable.    Monitoring & Evaluation     Dietitian will monitor food and beverage intake and weight change.  Nutrition Risk/Follow-Up: low (follow-up in 5-7 days)  Patients assigned 'low nutrition risk' status do not qualify for a full nutritional assessment but will be monitored and re-evaluated in a 5-7 day time period. Please consult if re-evaluation needed sooner.    Estephanie Kurtz, Registration Eligible, Provisional LDN

## 2023-02-14 NOTE — PROGRESS NOTES
02/14/23 0830   Pre Exercise Vitals   /75   Pulse 92   Supplemental O2? No   SpO2 96 %   During Exercise Vitals   Pulse 104   Supplemental O2? No   SpO2 92 %   Distance Walked 500 feet   Post Exercise Vitals   /76   Pulse 105   Supplemental O2? No   SpO2 100 %   Modality   Modality Walker     Standby assist only. Sternal precautions maintained. Gait steady and strong. Communicated with nurse pre and post walk.

## 2023-02-14 NOTE — PROGRESS NOTES
CT SURGERY PROGRESS NOTE  Flako Power  52 y.o.  1970    Patients Procedure: Procedure(s) (LRB):  REPLACEMENT, MITRAL VALVE (N/A)    Subjective  Interval History: POD 6    ROS    Medication List  Infusions   dexmedeTOMIDine (Precedex) infusion (titrating)      dextrose 5 % and 0.45 % NaCl 100 mL/hr at 02/08/23 1130    insulin regular 1 units/mL infusion orderable (CTS POST-OP) Stopped (02/09/23 0809)    loperamide       Scheduled   amLODIPine  5 mg Oral Daily    aspirin  81 mg Oral Daily    cefTRIAXone  2 g Intravenous Q12H    docusate sodium  100 mg Oral BID    enoxaparin  1 mg/kg Subcutaneous Q24H    folic acid  1 mg Oral Daily    losartan  25 mg Oral Daily    metoprolol tartrate  12.5 mg Oral BID    warfarin  7.5 mg Oral Daily       Objective:  Recent Vitals:  Temp:  [97.8 °F (36.6 °C)-99.3 °F (37.4 °C)] 98.1 °F (36.7 °C)  Pulse:  [78-86] 79  Resp:  [18] 18  SpO2:  [95 %-98 %] 95 %  BP: (108-144)/(72-88) 115/77    Physical Exam     I/O last 24 hrs:  Intake/Output - Last 3 Shifts         02/12 0700  02/13 0659 02/13 0700  02/14 0659 02/14 0700  02/15 0659    P.O. 840 1300     Total Intake(mL/kg) 840 (15.2) 1300 (23.6)     Net +840 +1300            Urine Occurrence 2 x 7 x     Stool Occurrence  1 x             Labs  ABGs: No results for input(s): PH, PCO2, PO2, HCO3, POCSATURATED, BE in the last 48 hours.  BMP: No results for input(s): GLU, NA, K, CL, CO2, BUN, CREATININE, CALCIUM, MG in the last 48 hours.  CBC: No results for input(s): WBC, RBC, HGB, HCT, PLT, MCV, MCH, MCHC in the last 48 hours.  CMP: No results for input(s): GLU, CALCIUM, ALBUMIN, PROT, NA, K, CO2, CL, BUN, CREATININE, ALKPHOS, ALT, AST, BILITOT in the last 48 hours.  Coagulation:   Recent Labs   Lab 02/14/23  0009   INR 1.40*         Imaging:   CXR: No results found in the last 24 hours.        ASSESSMENT/PLAN:    Cont coumadin 7.5 daily, lovenox   DC planning    Case and plan of care discussed with MD Speedy Almonte PA-C

## 2023-02-15 LAB
ALBUMIN SERPL-MCNC: 2.4 G/DL (ref 3.5–5)
ALBUMIN/GLOB SERPL: 0.8 RATIO (ref 1.1–2)
ALP SERPL-CCNC: 71 UNIT/L (ref 40–150)
ALT SERPL-CCNC: 39 UNIT/L (ref 0–55)
AST SERPL-CCNC: 25 UNIT/L (ref 5–34)
BASOPHILS # BLD AUTO: 0.04 X10(3)/MCL (ref 0–0.2)
BASOPHILS NFR BLD AUTO: 0.7 %
BILIRUBIN DIRECT+TOT PNL SERPL-MCNC: 0.3 MG/DL
BUN SERPL-MCNC: 9 MG/DL (ref 8.4–25.7)
CALCIUM SERPL-MCNC: 8.6 MG/DL (ref 8.4–10.2)
CHLORIDE SERPL-SCNC: 107 MMOL/L (ref 98–107)
CO2 SERPL-SCNC: 24 MMOL/L (ref 22–29)
CREAT SERPL-MCNC: 0.71 MG/DL (ref 0.73–1.18)
EOSINOPHIL # BLD AUTO: 0.19 X10(3)/MCL (ref 0–0.9)
EOSINOPHIL NFR BLD AUTO: 3.2 %
ERYTHROCYTE [DISTWIDTH] IN BLOOD BY AUTOMATED COUNT: 16.1 % (ref 11.5–17)
GFR SERPLBLD CREATININE-BSD FMLA CKD-EPI: >60 MLS/MIN/1.73/M2
GLOBULIN SER-MCNC: 3.1 GM/DL (ref 2.4–3.5)
GLUCOSE SERPL-MCNC: 82 MG/DL (ref 74–100)
HCT VFR BLD AUTO: 26.3 % (ref 42–52)
HGB BLD-MCNC: 8.3 GM/DL (ref 14–18)
IMM GRANULOCYTES # BLD AUTO: 0.03 X10(3)/MCL (ref 0–0.04)
IMM GRANULOCYTES NFR BLD AUTO: 0.5 %
INR BLD: 1.71 (ref 0–1.3)
LYMPHOCYTES # BLD AUTO: 0.98 X10(3)/MCL (ref 0.6–4.6)
LYMPHOCYTES NFR BLD AUTO: 16.4 %
MCH RBC QN AUTO: 25.7 PG
MCHC RBC AUTO-ENTMCNC: 31.6 MG/DL (ref 33–36)
MCV RBC AUTO: 81.4 FL (ref 80–94)
MONOCYTES # BLD AUTO: 0.56 X10(3)/MCL (ref 0.1–1.3)
MONOCYTES NFR BLD AUTO: 9.4 %
NEUTROPHILS # BLD AUTO: 4.18 X10(3)/MCL (ref 2.1–9.2)
NEUTROPHILS NFR BLD AUTO: 69.8 %
NRBC BLD AUTO-RTO: 0 %
PLATELET # BLD AUTO: 325 X10(3)/MCL (ref 130–400)
PMV BLD AUTO: 10.2 FL (ref 7.4–10.4)
POCT GLUCOSE: 139 MG/DL (ref 70–110)
POCT GLUCOSE: 84 MG/DL (ref 70–110)
POTASSIUM SERPL-SCNC: 3.9 MMOL/L (ref 3.5–5.1)
PROT SERPL-MCNC: 5.5 GM/DL (ref 6.4–8.3)
PROTHROMBIN TIME: 19.8 SECONDS (ref 12.5–14.5)
RBC # BLD AUTO: 3.23 X10(6)/MCL (ref 4.7–6.1)
SODIUM SERPL-SCNC: 141 MMOL/L (ref 136–145)
WBC # SPEC AUTO: 6 X10(3)/MCL (ref 4.5–11.5)

## 2023-02-15 PROCEDURE — 99024 PR POST-OP FOLLOW-UP VISIT: ICD-10-PCS | Mod: ,,, | Performed by: PHYSICIAN ASSISTANT

## 2023-02-15 PROCEDURE — 97110 THERAPEUTIC EXERCISES: CPT

## 2023-02-15 PROCEDURE — 85025 COMPLETE CBC W/AUTO DIFF WBC: CPT | Performed by: THORACIC SURGERY (CARDIOTHORACIC VASCULAR SURGERY)

## 2023-02-15 PROCEDURE — 25000003 PHARM REV CODE 250: Performed by: PHYSICIAN ASSISTANT

## 2023-02-15 PROCEDURE — 21400001 HC TELEMETRY ROOM

## 2023-02-15 PROCEDURE — 85610 PROTHROMBIN TIME: CPT | Performed by: PHYSICIAN ASSISTANT

## 2023-02-15 PROCEDURE — 63600175 PHARM REV CODE 636 W HCPCS: Performed by: PHYSICIAN ASSISTANT

## 2023-02-15 PROCEDURE — 94760 N-INVAS EAR/PLS OXIMETRY 1: CPT

## 2023-02-15 PROCEDURE — 99024 POSTOP FOLLOW-UP VISIT: CPT | Mod: ,,, | Performed by: PHYSICIAN ASSISTANT

## 2023-02-15 PROCEDURE — 80053 COMPREHEN METABOLIC PANEL: CPT | Performed by: THORACIC SURGERY (CARDIOTHORACIC VASCULAR SURGERY)

## 2023-02-15 RX ADMIN — ENOXAPARIN SODIUM 60 MG: 100 INJECTION SUBCUTANEOUS at 04:02

## 2023-02-15 RX ADMIN — CEFTRIAXONE SODIUM 2 G: 2 INJECTION, POWDER, FOR SOLUTION INTRAMUSCULAR; INTRAVENOUS at 01:02

## 2023-02-15 RX ADMIN — AMLODIPINE BESYLATE 5 MG: 5 TABLET ORAL at 08:02

## 2023-02-15 RX ADMIN — LOSARTAN POTASSIUM 25 MG: 25 TABLET, FILM COATED ORAL at 08:02

## 2023-02-15 RX ADMIN — ASPIRIN 81 MG: 81 TABLET, COATED ORAL at 08:02

## 2023-02-15 RX ADMIN — DOCUSATE SODIUM 100 MG: 100 CAPSULE, LIQUID FILLED ORAL at 08:02

## 2023-02-15 RX ADMIN — WARFARIN SODIUM 7.5 MG: 2.5 TABLET ORAL at 04:02

## 2023-02-15 RX ADMIN — METOPROLOL TARTRATE 12.5 MG: 25 TABLET, FILM COATED ORAL at 08:02

## 2023-02-15 RX ADMIN — CEFTRIAXONE SODIUM 2 G: 2 INJECTION, POWDER, FOR SOLUTION INTRAMUSCULAR; INTRAVENOUS at 04:02

## 2023-02-15 RX ADMIN — FOLIC ACID 1 MG: 1 TABLET ORAL at 08:02

## 2023-02-15 NOTE — PROGRESS NOTES
"Flako Power is a 52 y.o. male patient.   1. 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 Severe mitral valve regurgitation    3. Infective endocarditis of mitral valve    4. CAD (coronary artery disease)    5. Endocarditis of mitral valve    6. Discitis    7. Bacteremia      Past Medical History:   Diagnosis Date    Cough     Hypertension     Infective endocarditis     Mitral incompetence     PICC (peripherally inserted central catheter) in place      No past surgical history pertinent negatives on file.  Scheduled Meds:   amLODIPine  5 mg Oral Daily    aspirin  81 mg Oral Daily    cefTRIAXone  2 g Intravenous Q12H    docusate sodium  100 mg Oral BID    enoxaparin  1 mg/kg Subcutaneous Q24H    folic acid  1 mg Oral Daily    losartan  25 mg Oral Daily    metoprolol tartrate  12.5 mg Oral BID    warfarin  7.5 mg Oral Daily     Continuous Infusions:   dexmedeTOMIDine (Precedex) infusion (titrating)      dextrose 5 % and 0.45 % NaCl 100 mL/hr at 02/08/23 1130    insulin regular 1 units/mL infusion orderable (CTS POST-OP) Stopped (02/09/23 0809)    loperamide       PRN Meds:sodium chloride, albumin human 5%, calcium gluconate IVPB, calcium gluconate IVPB, calcium gluconate IVPB, dextrose 10%, dextrose 10%, hydrALAZINE, HYDROcodone-acetaminophen, lactulose 10 gram/15 ml, loperamide, magnesium sulfate IVPB, magnesium sulfate IVPB, metoclopramide HCl, morphine, ondansetron, oxyCODONE, potassium chloride in water, potassium chloride in water, potassium chloride in water, sodium phosphate IVPB, sodium phosphate IVPB, sodium phosphate IVPB    Review of patient's allergies indicates:  No Known Allergies  There are no hospital problems to display for this patient.    Blood pressure 114/74, pulse 90, temperature 98.4 °F (36.9 °C), temperature source Oral, resp. rate 18, height 4' 11" (1.499 m), weight 54.4 kg (119 lb 14.9 oz), SpO2 98 %.    Subjective:    POD # 7  Awake. Alert.  Sitting up in " chair     Objective:   AFVSS  Heart: RRR  Lungs respirations nonlabored, clear  Incision: c/d/I  INR: 1.71     Assesment/Plan:    S/p mechanical MVR  - Ambulate  - IS  - Coumadin 7.5mg  Target INR 2.5 - 3.5        ARNOL Gonzalez  2/15/2023

## 2023-02-15 NOTE — PROGRESS NOTES
02/15/23 0935   Pre Exercise Vitals   /72   Pulse 96   Supplemental O2? No   SpO2 99 %   During Exercise Vitals   Pulse 97   Supplemental O2? No  (unable to obtain, no SOB)   Distance Walked 500 ft   Post Exercise Vitals   /81   Pulse 96   Supplemental O2? No   SpO2 99 %   Modality   Modality Independent     Independent with ambulation, sternal precautions maintained, encouraged to ambulate 3 more times

## 2023-02-16 LAB
ABO + RH BLD: NORMAL
BLD PROD TYP BPU: NORMAL
BLOOD UNIT EXPIRATION DATE: NORMAL
BLOOD UNIT TYPE CODE: 5100
CROSSMATCH INTERPRETATION: NORMAL
DISPENSE STATUS: NORMAL
INR BLD: 1.72 (ref 0–1.3)
POCT GLUCOSE: 103 MG/DL (ref 70–110)
POCT GLUCOSE: 126 MG/DL (ref 70–110)
POCT GLUCOSE: 85 MG/DL (ref 70–110)
PROTHROMBIN TIME: 19.8 SECONDS (ref 12.5–14.5)
UNIT NUMBER: NORMAL

## 2023-02-16 PROCEDURE — 25000003 PHARM REV CODE 250: Performed by: PHYSICIAN ASSISTANT

## 2023-02-16 PROCEDURE — 97110 THERAPEUTIC EXERCISES: CPT

## 2023-02-16 PROCEDURE — 25000003 PHARM REV CODE 250: Performed by: THORACIC SURGERY (CARDIOTHORACIC VASCULAR SURGERY)

## 2023-02-16 PROCEDURE — 21400001 HC TELEMETRY ROOM

## 2023-02-16 PROCEDURE — 63600175 PHARM REV CODE 636 W HCPCS: Performed by: PHYSICIAN ASSISTANT

## 2023-02-16 PROCEDURE — 99024 PR POST-OP FOLLOW-UP VISIT: ICD-10-PCS | Mod: ,,, | Performed by: PHYSICIAN ASSISTANT

## 2023-02-16 PROCEDURE — 85610 PROTHROMBIN TIME: CPT | Performed by: PHYSICIAN ASSISTANT

## 2023-02-16 PROCEDURE — 94760 N-INVAS EAR/PLS OXIMETRY 1: CPT

## 2023-02-16 PROCEDURE — 63600175 PHARM REV CODE 636 W HCPCS: Performed by: THORACIC SURGERY (CARDIOTHORACIC VASCULAR SURGERY)

## 2023-02-16 PROCEDURE — 99024 POSTOP FOLLOW-UP VISIT: CPT | Mod: ,,, | Performed by: PHYSICIAN ASSISTANT

## 2023-02-16 RX ORDER — WARFARIN SODIUM 5 MG/1
10 TABLET ORAL DAILY
Status: DISCONTINUED | OUTPATIENT
Start: 2023-02-16 | End: 2023-02-20 | Stop reason: HOSPADM

## 2023-02-16 RX ORDER — ENOXAPARIN SODIUM 100 MG/ML
1 INJECTION SUBCUTANEOUS
Status: DISCONTINUED | OUTPATIENT
Start: 2023-02-16 | End: 2023-02-20 | Stop reason: HOSPADM

## 2023-02-16 RX ADMIN — LOSARTAN POTASSIUM 25 MG: 25 TABLET, FILM COATED ORAL at 09:02

## 2023-02-16 RX ADMIN — WARFARIN SODIUM 10 MG: 5 TABLET ORAL at 04:02

## 2023-02-16 RX ADMIN — METOPROLOL TARTRATE 12.5 MG: 25 TABLET, FILM COATED ORAL at 08:02

## 2023-02-16 RX ADMIN — CEFTRIAXONE SODIUM 2 G: 2 INJECTION, POWDER, FOR SOLUTION INTRAMUSCULAR; INTRAVENOUS at 02:02

## 2023-02-16 RX ADMIN — DOCUSATE SODIUM 100 MG: 100 CAPSULE, LIQUID FILLED ORAL at 08:02

## 2023-02-16 RX ADMIN — FOLIC ACID 1 MG: 1 TABLET ORAL at 09:02

## 2023-02-16 RX ADMIN — AMLODIPINE BESYLATE 5 MG: 5 TABLET ORAL at 09:02

## 2023-02-16 RX ADMIN — METOPROLOL TARTRATE 12.5 MG: 25 TABLET, FILM COATED ORAL at 09:02

## 2023-02-16 RX ADMIN — ENOXAPARIN SODIUM 60 MG: 80 INJECTION SUBCUTANEOUS at 09:02

## 2023-02-16 RX ADMIN — DOCUSATE SODIUM 100 MG: 100 CAPSULE, LIQUID FILLED ORAL at 09:02

## 2023-02-16 RX ADMIN — ENOXAPARIN SODIUM 60 MG: 80 INJECTION SUBCUTANEOUS at 08:02

## 2023-02-16 RX ADMIN — ASPIRIN 81 MG: 81 TABLET, COATED ORAL at 09:02

## 2023-02-16 NOTE — CONSULTS
Infectious Diseases Consultation       Inpatient consult to Infectious Diseases  Consult performed by: Cristal Bolaños MD  Consult ordered by: JOSE Dubois      HPI:   52-year-old male known to my team, recently admitted to this same facility, [facility] on 01/16/2023, initially presenting to [facility] ED with complaints of back pain .  He  apparently started with left knee in mid December 2022  seen at urgent care with x-rays unremarkable.  He was seen at [facility] ED on 12/20 and was diagnosed with lumbosacral strain.  He did have progressive worsening back pain which led to presentation back to [facility] on 01/16 with report of fevers of up to 102.7 and leukocytosis of 12.3, ESR 63, CRP 89, COVID 19 and influenza negative.  He was also noted to have abnormal renal function at the time with creatinine of 1.19, ESR 63 CRP 89.8.  Two sets of blood cultures from that admission were positive for Streptococcus sanguinis.  CTA chest showed no PE but with irregularity of the T8 inferior endplate which may relate to degenerative changes but can not exclude diskitis/osteomyelitis.  CT lumbar spine showed degenerative changes and spondylolisthesis at the level of L5-S1, anterolisthesis of L5 on S1.  MRI thoracic spine showed findings concerning for diskitis and osteomyelitis at T8-T9, no epidural or paraspinal fluid collection with no significant spinal canal or neural foraminal stenosis.  2D echocardiogram showed anterior mitral leaflet vegetation with severe mitral regurgitation.  A follow-up LAXMI showed large mitral valve vegetation associated with valve perforation and severe MR.  He was placed on ceftriaxone and discharged with long-term IV antibiotics on 01/25.  He was brought back to the hospital for this admission and had high-risk mitral valve replacement using mechanical prosthesis with left atrial appendage ligation on 02/08 and is doing well postoperatively.  He has been without much of  fevers except for some low-grade temps of up to 99.9 noted on 02/10, did have some leukocytosis of up to 14.2 on same day.  2/8 blood cultures have been negative and 2/8 valve tissue cultures negative.  He is anemic.  Review of his records show 2/6 ESR 81 and CRP 32.6.  1/16 blood cultures with Streptococcus sanguinis and a follow-up negative blood culture set on 1/20.  2/11 chest x-ray shows persistent retrocardiac atelectasis.  He remains on antibiotic coverage with ceftriaxone.    Past Medical and Surgical History  Allergies :   Patient has no known allergies.    Medical :   He has a past medical history of Cough, Hypertension, Infective endocarditis, Mitral incompetence, and PICC (peripherally inserted central catheter) in place.    Surgical :   He has a past surgical history that includes Left heart catheterization (N/A, 01/19/2023); Abdominal surgery; Tonsillectomy; and Mitral valve replacement (N/A, 2/8/2023).     Family History  His family history is not on file.    Social History  He reports that he has never smoked. He has never used smokeless tobacco. He reports that he does not currently use alcohol. He reports that he does not use drugs.     Review of Systems   Constitutional:  Positive for malaise/fatigue.   HENT: Negative.     Respiratory: Negative.     Gastrointestinal: Negative.    Genitourinary: Negative.    Musculoskeletal: Negative.    Endo/Heme/Allergies: Negative.    Psychiatric/Behavioral: Negative.     All other Systems review done and negative.    Objective   Physical Exam  Vitals reviewed.   Constitutional:       General: He is not in acute distress.     Comments: Lying in bed.  Son at the bedside   HENT:      Head: Normocephalic and atraumatic.   Eyes:      Pupils: Pupils are equal, round, and reactive to light.   Cardiovascular:      Rate and Rhythm: Normal rate and regular rhythm.      Heart sounds: Normal heart sounds.   Pulmonary:      Effort: Pulmonary effort is normal. No respiratory  "distress.      Breath sounds: Normal breath sounds.   Abdominal:      General: Bowel sounds are normal. There is no distension.      Palpations: Abdomen is soft.      Tenderness: There is no abdominal tenderness.   Genitourinary:     Comments: No suprapubic tenderness  Musculoskeletal:         General: No deformity.      Cervical back: Neck supple.      Right lower leg: No edema.      Left lower leg: No edema.   Skin:     Findings: No erythema or rash.      Comments: Midsternal surgical site healing well   Neurological:      Mental Status: He is alert.   Psychiatric:      Comments: Calm and cooperative     VITAL SIGNS: 24 HR MIN & MAX LAST    Temp  Min: 98 °F (36.7 °C)  Max: 98.6 °F (37 °C)  98 °F (36.7 °C)        BP  Min: 104/74  Max: 122/77  122/77     Pulse  Min: 77  Max: 90  84     Resp  Min: 16  Max: 18  18    SpO2  Min: 97 %  Max: 99 %  97 %      HT: 4' 11" (149.9 cm)  WT: 54.4 kg (119 lb 14.9 oz)  BMI: 24.2     Recent Results (from the past 24 hour(s))   Protime-INR    Collection Time: 02/15/23 12:22 AM   Result Value Ref Range    PT 19.8 (H) 12.5 - 14.5 seconds    INR 1.71 (H) 0.00 - 1.30   CBC with Differential    Collection Time: 02/15/23  4:14 AM   Result Value Ref Range    WBC 6.0 4.5 - 11.5 x10(3)/mcL    RBC 3.23 (L) 4.70 - 6.10 x10(6)/mcL    Hgb 8.3 (L) 14.0 - 18.0 gm/dL    Hct 26.3 (L) 42.0 - 52.0 %    MCV 81.4 80.0 - 94.0 fL    MCH 25.7 pg    MCHC 31.6 (L) 33.0 - 36.0 mg/dL    RDW 16.1 11.5 - 17.0 %    Platelet 325 130 - 400 x10(3)/mcL    MPV 10.2 7.4 - 10.4 fL    Neut % 69.8 %    Lymph % 16.4 %    Mono % 9.4 %    Eos % 3.2 %    Basophil % 0.7 %    Lymph # 0.98 0.6 - 4.6 x10(3)/mcL    Neut # 4.18 2.1 - 9.2 x10(3)/mcL    Mono # 0.56 0.1 - 1.3 x10(3)/mcL    Eos # 0.19 0 - 0.9 x10(3)/mcL    Baso # 0.04 0 - 0.2 x10(3)/mcL    IG# 0.03 0 - 0.04 x10(3)/mcL    IG% 0.5 %    NRBC% 0.0 %   Comprehensive Metabolic Panel    Collection Time: 02/15/23  4:16 AM   Result Value Ref Range    Sodium Level 141 136 - 145 " mmol/L    Potassium Level 3.9 3.5 - 5.1 mmol/L    Chloride 107 98 - 107 mmol/L    Carbon Dioxide 24 22 - 29 mmol/L    Glucose Level 82 74 - 100 mg/dL    Blood Urea Nitrogen 9.0 8.4 - 25.7 mg/dL    Creatinine 0.71 (L) 0.73 - 1.18 mg/dL    Calcium Level Total 8.6 8.4 - 10.2 mg/dL    Protein Total 5.5 (L) 6.4 - 8.3 gm/dL    Albumin Level 2.4 (L) 3.5 - 5.0 g/dL    Globulin 3.1 2.4 - 3.5 gm/dL    Albumin/Globulin Ratio 0.8 (L) 1.1 - 2.0 ratio    Bilirubin Total 0.3 <=1.5 mg/dL    Alkaline Phosphatase 71 40 - 150 unit/L    Alanine Aminotransferase 39 0 - 55 unit/L    Aspartate Aminotransferase 25 5 - 34 unit/L    eGFR >60 mls/min/1.73/m2   POCT glucose    Collection Time: 02/15/23 11:06 AM   Result Value Ref Range    POCT Glucose 84 70 - 110 mg/dL   POCT glucose    Collection Time: 02/15/23  6:37 PM   Result Value Ref Range    POCT Glucose 139 (H) 70 - 110 mg/dL       Imaging     XR CHEST AP PORTABLE     CLINICAL HISTORY:   Post-op;     TECHNIQUE:   Single frontal view of the chest was performed.     COMPARISON:   02/10/2023     FINDINGS:   LINES AND TUBES: Right upper extremity PICC tip projects over the SVC.  EKG/telemetry leads overlie the chest.  Interval removal of anterior mediastinal drains.     MEDIASTINUM AND CLAUDIA: Cardiac silhouette is enlarged.  Valve prosthesis and atrial appendage clip.     LUNGS: Persistent retrocardiac opacity silhouetting the diaphragm.     PLEURA:No significant pleural effusion. No pneumothorax.     BONES: No acute osseous abnormality.    Impression:       Persistent retrocardiac atelectasis.          Impression  1. Streptococcus sanguinis mitral valve endocarditis  2. T-spine osteomyelitis/diskitis  3.  Elevated ESR, CRP  4.  Anemia     Recommendations  I agree with the management of this patient.  Recent history of Streptococcus sanguinis sepsis with findings of mitral valve endocarditis and thoracic spine osteomyelitis/diskitis on long-term IV antibiotics with ceftriaxone and now  status post mitral valve replacement and doing well.  We will continue coverage with ceftriaxone and plan end date of 03/03 but follow inflammatory markers to ensure normalization.  I do recommend repeating MRI of the T-spine as well prior to end of antibiotic therapy to ensure eradication of the infection and to better protect this newly placed prosthetic valve.  Case is discussed with patient and son at bedside as well as discussed with nursing staff.  I would like to thank the team very much for the opportunity to assist in the care of this patient.  He needs to follow up closely with his primary medical team.  Case management to assist with setting up continued outpatient IV antibiotics.

## 2023-02-16 NOTE — PROGRESS NOTES
02/16/23 0845   Pre Exercise Vitals   /68   Pulse 90   Supplemental O2? No   SpO2 100 %   During Exercise Vitals   Pulse 97   Supplemental O2? No   SpO2 100 %   Post Exercise Vitals   /83   Pulse 100   Supplemental O2? No   SpO2 100 %   Modality   Modality Independent     Ambulated 600 ft, no assistance needed, IS x 750 ml x 5 - encouraged 10 x an hour

## 2023-02-17 LAB
INR BLD: 2.15 (ref 0–1.3)
POCT GLUCOSE: 121 MG/DL (ref 70–110)
POCT GLUCOSE: 84 MG/DL (ref 70–110)
POCT GLUCOSE: 94 MG/DL (ref 70–110)
PROTHROMBIN TIME: 23.6 SECONDS (ref 12.5–14.5)

## 2023-02-17 PROCEDURE — 99024 PR POST-OP FOLLOW-UP VISIT: ICD-10-PCS | Mod: ,,, | Performed by: PHYSICIAN ASSISTANT

## 2023-02-17 PROCEDURE — 63600175 PHARM REV CODE 636 W HCPCS: Performed by: THORACIC SURGERY (CARDIOTHORACIC VASCULAR SURGERY)

## 2023-02-17 PROCEDURE — 99024 POSTOP FOLLOW-UP VISIT: CPT | Mod: ,,, | Performed by: PHYSICIAN ASSISTANT

## 2023-02-17 PROCEDURE — 94760 N-INVAS EAR/PLS OXIMETRY 1: CPT

## 2023-02-17 PROCEDURE — 63600175 PHARM REV CODE 636 W HCPCS: Performed by: PHYSICIAN ASSISTANT

## 2023-02-17 PROCEDURE — 85610 PROTHROMBIN TIME: CPT | Performed by: PHYSICIAN ASSISTANT

## 2023-02-17 PROCEDURE — 21400001 HC TELEMETRY ROOM

## 2023-02-17 PROCEDURE — 25000003 PHARM REV CODE 250: Performed by: PHYSICIAN ASSISTANT

## 2023-02-17 PROCEDURE — 25000003 PHARM REV CODE 250: Performed by: THORACIC SURGERY (CARDIOTHORACIC VASCULAR SURGERY)

## 2023-02-17 PROCEDURE — 97110 THERAPEUTIC EXERCISES: CPT

## 2023-02-17 RX ADMIN — AMLODIPINE BESYLATE 5 MG: 5 TABLET ORAL at 09:02

## 2023-02-17 RX ADMIN — LOSARTAN POTASSIUM 25 MG: 25 TABLET, FILM COATED ORAL at 09:02

## 2023-02-17 RX ADMIN — ENOXAPARIN SODIUM 60 MG: 80 INJECTION SUBCUTANEOUS at 09:02

## 2023-02-17 RX ADMIN — CEFTRIAXONE SODIUM 2 G: 2 INJECTION, POWDER, FOR SOLUTION INTRAMUSCULAR; INTRAVENOUS at 02:02

## 2023-02-17 RX ADMIN — WARFARIN SODIUM 10 MG: 5 TABLET ORAL at 05:02

## 2023-02-17 RX ADMIN — DOCUSATE SODIUM 100 MG: 100 CAPSULE, LIQUID FILLED ORAL at 09:02

## 2023-02-17 RX ADMIN — METOPROLOL TARTRATE 12.5 MG: 25 TABLET, FILM COATED ORAL at 09:02

## 2023-02-17 RX ADMIN — ASPIRIN 81 MG: 81 TABLET, COATED ORAL at 09:02

## 2023-02-17 RX ADMIN — FOLIC ACID 1 MG: 1 TABLET ORAL at 09:02

## 2023-02-17 NOTE — PROGRESS NOTES
CT SURGERY PROGRESS NOTE  Flako Power  52 y.o.  1970    Patients Procedure: Procedure(s) (LRB):  REPLACEMENT, MITRAL VALVE (N/A)    Subjective  Interval History: NAD    ROS    Medication List  Infusions   dexmedeTOMIDine (Precedex) infusion (titrating)      dextrose 5 % and 0.45 % NaCl 100 mL/hr at 02/08/23 1130    insulin regular 1 units/mL infusion orderable (CTS POST-OP) Stopped (02/09/23 0809)    loperamide       Scheduled   amLODIPine  5 mg Oral Daily    aspirin  81 mg Oral Daily    cefTRIAXone  2 g Intravenous Q12H    docusate sodium  100 mg Oral BID    enoxaparin  1 mg/kg Subcutaneous Q12H    folic acid  1 mg Oral Daily    losartan  25 mg Oral Daily    metoprolol tartrate  12.5 mg Oral BID    warfarin  10 mg Oral Daily       Objective:  Recent Vitals:  Temp:  [97.6 °F (36.4 °C)-98.6 °F (37 °C)] 98.3 °F (36.8 °C)  Pulse:  [70-92] 70  Resp:  [18] 18  SpO2:  [97 %-100 %] 97 %  BP: (101-126)/(66-86) 125/84    Physical Exam     I/O last 24 hrs:  Intake/Output - Last 3 Shifts         02/15 0700  02/16 0659 02/16 0700  02/17 0659 02/17 0700  02/18 0659    P.O. 960 1680     Total Intake(mL/kg) 960 (17.5) 1680 (30.5)     Net +960 +1680            Urine Occurrence 7 x 7 x     Stool Occurrence 1 x 1 x             Labs  Coagulation:   Recent Labs   Lab 02/17/23  0008   INR 2.15*         Imaging:   CXR: No results found in the last 24 hours.        ASSESSMENT/PLAN:    Cont coumadion/lovenox   DC when INR therapeutic    Case and plan of care discussed with MD Speedy Almonte PA-C

## 2023-02-17 NOTE — PROGRESS NOTES
02/17/23 0910   Pre Exercise Vitals   BP   (already in hallway walking with son)   During Exercise Vitals   Pulse 70   Supplemental O2? No   SpO2 100 %   Distance Walked 400 ft   Post Exercise Vitals   /64   Pulse 94   Supplemental O2? No   SpO2 100 %   Modality   Modality Independent     Independent in ambulation, sternal precautions maintained

## 2023-02-18 LAB
INR BLD: 2.29 (ref 0–1.3)
PROTHROMBIN TIME: 24.8 SECONDS (ref 12.5–14.5)

## 2023-02-18 PROCEDURE — 25000003 PHARM REV CODE 250: Performed by: THORACIC SURGERY (CARDIOTHORACIC VASCULAR SURGERY)

## 2023-02-18 PROCEDURE — 25000003 PHARM REV CODE 250: Performed by: PHYSICIAN ASSISTANT

## 2023-02-18 PROCEDURE — 99024 POSTOP FOLLOW-UP VISIT: CPT | Mod: ,,, | Performed by: PHYSICIAN ASSISTANT

## 2023-02-18 PROCEDURE — 85610 PROTHROMBIN TIME: CPT | Performed by: PHYSICIAN ASSISTANT

## 2023-02-18 PROCEDURE — 63600175 PHARM REV CODE 636 W HCPCS: Performed by: THORACIC SURGERY (CARDIOTHORACIC VASCULAR SURGERY)

## 2023-02-18 PROCEDURE — 21400001 HC TELEMETRY ROOM

## 2023-02-18 PROCEDURE — 63600175 PHARM REV CODE 636 W HCPCS: Performed by: PHYSICIAN ASSISTANT

## 2023-02-18 PROCEDURE — 99024 PR POST-OP FOLLOW-UP VISIT: ICD-10-PCS | Mod: ,,, | Performed by: PHYSICIAN ASSISTANT

## 2023-02-18 RX ADMIN — CEFTRIAXONE SODIUM 2 G: 2 INJECTION, POWDER, FOR SOLUTION INTRAMUSCULAR; INTRAVENOUS at 02:02

## 2023-02-18 RX ADMIN — ENOXAPARIN SODIUM 60 MG: 80 INJECTION SUBCUTANEOUS at 09:02

## 2023-02-18 RX ADMIN — LOSARTAN POTASSIUM 25 MG: 25 TABLET, FILM COATED ORAL at 09:02

## 2023-02-18 RX ADMIN — METOPROLOL TARTRATE 12.5 MG: 25 TABLET, FILM COATED ORAL at 09:02

## 2023-02-18 RX ADMIN — DOCUSATE SODIUM 100 MG: 100 CAPSULE, LIQUID FILLED ORAL at 09:02

## 2023-02-18 RX ADMIN — AMLODIPINE BESYLATE 5 MG: 5 TABLET ORAL at 09:02

## 2023-02-18 RX ADMIN — ASPIRIN 81 MG: 81 TABLET, COATED ORAL at 09:02

## 2023-02-18 RX ADMIN — FOLIC ACID 1 MG: 1 TABLET ORAL at 09:02

## 2023-02-18 RX ADMIN — WARFARIN SODIUM 10 MG: 5 TABLET ORAL at 04:02

## 2023-02-18 NOTE — PROGRESS NOTES
"Flako Power is a 52 y.o. male patient.   1. 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 Severe mitral valve regurgitation    3. Infective endocarditis of mitral valve    4. CAD (coronary artery disease)    5. Endocarditis of mitral valve    6. Discitis    7. Bacteremia      Past Medical History:   Diagnosis Date    Cough     Hypertension     Infective endocarditis     Mitral incompetence     PICC (peripherally inserted central catheter) in place      No past surgical history pertinent negatives on file.  Scheduled Meds:   amLODIPine  5 mg Oral Daily    aspirin  81 mg Oral Daily    cefTRIAXone  2 g Intravenous Q12H    docusate sodium  100 mg Oral BID    enoxaparin  1 mg/kg Subcutaneous Q12H    folic acid  1 mg Oral Daily    losartan  25 mg Oral Daily    metoprolol tartrate  12.5 mg Oral BID    warfarin  10 mg Oral Daily     Continuous Infusions:   dexmedeTOMIDine (Precedex) infusion (titrating)      dextrose 5 % and 0.45 % NaCl 100 mL/hr at 02/08/23 1130    insulin regular 1 units/mL infusion orderable (CTS POST-OP) Stopped (02/09/23 0809)    loperamide       PRN Meds:sodium chloride, albumin human 5%, calcium gluconate IVPB, calcium gluconate IVPB, calcium gluconate IVPB, dextrose 10%, dextrose 10%, hydrALAZINE, HYDROcodone-acetaminophen, lactulose 10 gram/15 ml, loperamide, magnesium sulfate IVPB, magnesium sulfate IVPB, metoclopramide HCl, morphine, ondansetron, oxyCODONE, potassium chloride in water, potassium chloride in water, potassium chloride in water, sodium phosphate IVPB, sodium phosphate IVPB, sodium phosphate IVPB    Review of patient's allergies indicates:  No Known Allergies  There are no hospital problems to display for this patient.    Blood pressure 118/80, pulse 92, temperature 98.5 °F (36.9 °C), temperature source Oral, resp. rate 18, height 4' 11" (1.499 m), weight 54.5 kg (120 lb 2.4 oz), SpO2 99 %.    Subjective:    POD # 10  Awake. Alert.  Sitting up in " chair  On Coumadin 10mg    Objective:   AFVSS  Heart: RRR  Lungs respirations nonlabored, clear  Incision: c/d/I  INR: 2.29     Assesment/Plan:    S/p mechanical MVR  - Ambulate  - IS  - Await goal INR: 2.5 - 3.5        ARNOL Gonzalez  2/18/2023

## 2023-02-19 PROBLEM — I38 ENDOCARDITIS: Status: ACTIVE | Noted: 2023-02-19

## 2023-02-19 LAB
INR BLD: 2.65 (ref 0–1.3)
PROTHROMBIN TIME: 27.7 SECONDS (ref 12.5–14.5)

## 2023-02-19 PROCEDURE — 63600175 PHARM REV CODE 636 W HCPCS: Performed by: THORACIC SURGERY (CARDIOTHORACIC VASCULAR SURGERY)

## 2023-02-19 PROCEDURE — 85610 PROTHROMBIN TIME: CPT | Performed by: PHYSICIAN ASSISTANT

## 2023-02-19 PROCEDURE — 25000003 PHARM REV CODE 250: Performed by: PHYSICIAN ASSISTANT

## 2023-02-19 PROCEDURE — 63600175 PHARM REV CODE 636 W HCPCS: Performed by: PHYSICIAN ASSISTANT

## 2023-02-19 PROCEDURE — 21400001 HC TELEMETRY ROOM

## 2023-02-19 PROCEDURE — 99024 PR POST-OP FOLLOW-UP VISIT: ICD-10-PCS | Mod: ,,, | Performed by: PHYSICIAN ASSISTANT

## 2023-02-19 PROCEDURE — 99024 POSTOP FOLLOW-UP VISIT: CPT | Mod: ,,, | Performed by: PHYSICIAN ASSISTANT

## 2023-02-19 PROCEDURE — 25000003 PHARM REV CODE 250: Performed by: THORACIC SURGERY (CARDIOTHORACIC VASCULAR SURGERY)

## 2023-02-19 RX ORDER — WARFARIN 10 MG/1
10 TABLET ORAL DAILY
Qty: 30 TABLET | Refills: 11 | Status: SHIPPED | OUTPATIENT
Start: 2023-02-19 | End: 2023-04-17 | Stop reason: DRUGHIGH

## 2023-02-19 RX ORDER — METOPROLOL TARTRATE 25 MG/1
12.5 TABLET, FILM COATED ORAL 2 TIMES DAILY
Qty: 30 TABLET | Refills: 0 | Status: SHIPPED | OUTPATIENT
Start: 2023-02-19 | End: 2023-05-02 | Stop reason: SDUPTHER

## 2023-02-19 RX ADMIN — DOCUSATE SODIUM 100 MG: 100 CAPSULE, LIQUID FILLED ORAL at 08:02

## 2023-02-19 RX ADMIN — METOPROLOL TARTRATE 12.5 MG: 25 TABLET, FILM COATED ORAL at 08:02

## 2023-02-19 RX ADMIN — AMLODIPINE BESYLATE 5 MG: 5 TABLET ORAL at 08:02

## 2023-02-19 RX ADMIN — CEFTRIAXONE SODIUM 2 G: 2 INJECTION, POWDER, FOR SOLUTION INTRAMUSCULAR; INTRAVENOUS at 04:02

## 2023-02-19 RX ADMIN — ENOXAPARIN SODIUM 60 MG: 80 INJECTION SUBCUTANEOUS at 08:02

## 2023-02-19 RX ADMIN — WARFARIN SODIUM 10 MG: 5 TABLET ORAL at 04:02

## 2023-02-19 RX ADMIN — CEFTRIAXONE SODIUM 2 G: 2 INJECTION, POWDER, FOR SOLUTION INTRAMUSCULAR; INTRAVENOUS at 02:02

## 2023-02-19 RX ADMIN — LOSARTAN POTASSIUM 25 MG: 25 TABLET, FILM COATED ORAL at 08:02

## 2023-02-19 RX ADMIN — ASPIRIN 81 MG: 81 TABLET, COATED ORAL at 08:02

## 2023-02-19 RX ADMIN — FOLIC ACID 1 MG: 1 TABLET ORAL at 08:02

## 2023-02-19 NOTE — PROGRESS NOTES
"Flako Power is a 52 y.o. male patient.   1. 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 Severe mitral valve regurgitation    3. Infective endocarditis of mitral valve    4. CAD (coronary artery disease)    5. Endocarditis of mitral valve    6. Discitis    7. Bacteremia      Past Medical History:   Diagnosis Date    Cough     Hypertension     Infective endocarditis     Mitral incompetence     PICC (peripherally inserted central catheter) in place      No past surgical history pertinent negatives on file.  Scheduled Meds:   amLODIPine  5 mg Oral Daily    aspirin  81 mg Oral Daily    cefTRIAXone  2 g Intravenous Q12H    docusate sodium  100 mg Oral BID    enoxaparin  1 mg/kg Subcutaneous Q12H    folic acid  1 mg Oral Daily    losartan  25 mg Oral Daily    metoprolol tartrate  12.5 mg Oral BID    warfarin  10 mg Oral Daily     Continuous Infusions:   loperamide       PRN Meds:sodium chloride, albumin human 5%, calcium gluconate IVPB, calcium gluconate IVPB, calcium gluconate IVPB, dextrose 10%, dextrose 10%, hydrALAZINE, HYDROcodone-acetaminophen, lactulose 10 gram/15 ml, loperamide, magnesium sulfate IVPB, magnesium sulfate IVPB, metoclopramide HCl, morphine, ondansetron, oxyCODONE, potassium chloride in water, potassium chloride in water, potassium chloride in water, sodium phosphate IVPB, sodium phosphate IVPB, sodium phosphate IVPB    Review of patient's allergies indicates:  No Known Allergies  There are no hospital problems to display for this patient.    Blood pressure 115/81, pulse 88, temperature 98.3 °F (36.8 °C), temperature source Oral, resp. rate 18, height 4' 11" (1.499 m), weight 54.6 kg (120 lb 5.9 oz), SpO2 99 %.    Subjective:    POD # 11  Awake. Alert.  Sitting up in chair  Ambulating well  HH IV antibiotics arranged  On Coumadin 10mg     Objective:   AFVSS  Heart: RRR  Lungs respirations nonlabored, clear  Incision: c/d/I  INR: 2.65     Assesment/Plan:    S/p " mechanical MVR  - Discharge home with HH for IV antibiotics  - Arrange coumadin clinic or HH for daily INR  - Await goal INR: 2.5 - 3.5         ARNOL Gonzalez  2/19/2023

## 2023-02-19 NOTE — PLAN OF CARE
Spoke to Micky with [facility]. IV infusion medication cannot provided today because of Moshe Carey. Nurse notified.

## 2023-02-20 VITALS
TEMPERATURE: 98 F | BODY MASS INDEX: 23.32 KG/M2 | OXYGEN SATURATION: 98 % | HEIGHT: 60 IN | DIASTOLIC BLOOD PRESSURE: 77 MMHG | WEIGHT: 118.81 LBS | SYSTOLIC BLOOD PRESSURE: 123 MMHG | RESPIRATION RATE: 18 BRPM | HEART RATE: 83 BPM

## 2023-02-20 LAB
INR BLD: 2.67 (ref 0–1.3)
PROTHROMBIN TIME: 27.9 SECONDS (ref 12.5–14.5)

## 2023-02-20 PROCEDURE — 94760 N-INVAS EAR/PLS OXIMETRY 1: CPT

## 2023-02-20 PROCEDURE — 97110 THERAPEUTIC EXERCISES: CPT

## 2023-02-20 PROCEDURE — 94761 N-INVAS EAR/PLS OXIMETRY MLT: CPT

## 2023-02-20 PROCEDURE — 63600175 PHARM REV CODE 636 W HCPCS: Performed by: PHYSICIAN ASSISTANT

## 2023-02-20 PROCEDURE — 25000003 PHARM REV CODE 250: Performed by: PHYSICIAN ASSISTANT

## 2023-02-20 RX ADMIN — METOPROLOL TARTRATE 12.5 MG: 25 TABLET, FILM COATED ORAL at 09:02

## 2023-02-20 RX ADMIN — CEFTRIAXONE SODIUM 2 G: 2 INJECTION, POWDER, FOR SOLUTION INTRAMUSCULAR; INTRAVENOUS at 02:02

## 2023-02-20 RX ADMIN — FOLIC ACID 1 MG: 1 TABLET ORAL at 09:02

## 2023-02-20 RX ADMIN — LOSARTAN POTASSIUM 25 MG: 25 TABLET, FILM COATED ORAL at 09:02

## 2023-02-20 RX ADMIN — CEFTRIAXONE SODIUM 2 G: 2 INJECTION, POWDER, FOR SOLUTION INTRAMUSCULAR; INTRAVENOUS at 03:02

## 2023-02-20 RX ADMIN — ASPIRIN 81 MG: 81 TABLET, COATED ORAL at 09:02

## 2023-02-20 RX ADMIN — DOCUSATE SODIUM 100 MG: 100 CAPSULE, LIQUID FILLED ORAL at 09:02

## 2023-02-20 RX ADMIN — AMLODIPINE BESYLATE 5 MG: 5 TABLET ORAL at 09:02

## 2023-02-20 NOTE — PLAN OF CARE
Patient is discharging today with [facility] and [facility] for Home IV, everything is set up.

## 2023-02-20 NOTE — PROGRESS NOTES
Discharge instructions given to patient and patient's family. Pt's heart monitor removed. Pt discharged home with home health.

## 2023-02-20 NOTE — PROGRESS NOTES
02/20/23 0950   Pre Exercise Vitals   /85   Pulse 83  (NSR)   Supplemental O2? No   SpO2 99 %   During Exercise Vitals   Pulse 91   Supplemental O2? No   SpO2 100 %   Distance Walked 600ft   Post Exercise Vitals   /81   Pulse 90   Supplemental O2? No   SpO2 99 %   Modality   Modality Independent     Independent in ambulation; patient standing in room prior; sternal precautions maintained;for discharge today

## 2023-02-22 ENCOUNTER — TELEPHONE (OUTPATIENT)
Dept: CARDIAC SURGERY | Facility: CLINIC | Age: 53
End: 2023-02-22
Payer: MEDICAID

## 2023-02-22 NOTE — TELEPHONE ENCOUNTER
Lab PT/INR results sent to office from [facility].  Notified Dr. Rodrigues.  Reports to have Cardiology-Dr. Pimentel to manage.  Notified  at [facility] Office.

## 2023-02-23 ENCOUNTER — PATIENT OUTREACH (OUTPATIENT)
Dept: ADMINISTRATIVE | Facility: CLINIC | Age: 53
End: 2023-02-23
Payer: MEDICAID

## 2023-02-23 NOTE — PROGRESS NOTES
C3 nurse spoke with Flako Power  for a TCC post hospital discharge follow up call. The patient has a scheduled HOSFU appointment with Dr. Rendon on 03/01/2023 @ 120 pm.  Appointment with Dr. Rodrigues 03/13/2023 @ 940 am.

## 2023-03-02 NOTE — PHYSICIAN QUERY
PT Name: Flako Power  MR #: [id number]     DOCUMENTATION CLARIFICATION      CDS/: Char Vance RN             Contact information: [email or web address]    This form is a permanent document in the medical record.     Query Date: March 2, 2023    Dear Provider,  By submitting this query, we are merely seeking further clarification of documentation.  Please utilize your independent clinical judgment when addressing the question(s) below.     The Medical Record contains the following:    Supporting Clinical Findings   Location in Medical Record   The patient has had increasing fever and persistent bacteremia.       7. Bacteremia Interval H&P 2/8 CTS  Filed 1/30 CTS      PN 2/11  CTS      A follow-up LAXMI showed large mitral valve vegetation associated with valve perforation and severe MR.  He was placed on ceftriaxone and discharged with long-term  IV antibiotics on 01/25.  He was brought back to the hospital for this admission and had high-risk mitral valve replacement using mechanical prosthesis with left atrial appendage ligation on 02/08 and is doing well postoperatively.  He has been without  much of fevers except for some low-grade temps of up to 99.9 noted on 02/10, did have some leukocytosis of up to 14.2 on same day.  2/8 blood cultures have been negative and 2/8 valve tissue cultures negative      1/16 blood cultures with Streptococcus sanguinis and a follow-up negative blood culture set on 1/20.   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  We will continue coverage with ceftriaxone and plan end date of 03/03 but follow inflammatory markers to ensure normalization. Consult 2/15 ID     Please  clarify if the ____ Bacteremia ____________________ diagnosis has been:    [ x ] Ruled In, Active diagnosis for this admission     [  ] Ruled Out, Past medical history     [  ] Other/Clarification of findings (please specify): _______________             Please document in your progress notes daily for the duration of treatment, until resolved, and include in your discharge summary.    Form No. [id number]

## 2023-03-09 NOTE — PHYSICIAN QUERY
PT Name: Flako Power  MR #: [id number]     Documentation Clarification      CDS/: Char Vance RN              Contact information:[email or web address]  Query withdrawn      This form is a permanent document in the medical record.     Query Date: March 9, 2023    By submitting this query, we are merely seeking further clarification of documentation. Please utilize your independent clinical judgment when addressing the question(s) below.    The Medical Record reflects the following:    Supporting Clinical Findings   Location in Medical Record   Procedure:      1.  High-risk mitral valve replacement with a number 31 Medtronic ats mechanical prosthesis  2. Left atrial appendage ligation with a number 40 AtriClip device    Pre-Operative Diagnosis:      1.  Subacute bacterial endocarditis  2.  Severe mitral regurgitation  3. History of diskitis    Operative Findings (including complications, if any):  Severely diseased anterior and posterior mitral valve leaflets with diseased chordae and large amounts of granulation tissue.      The mitral valve was exposed through Waterston groove and the valve leaflets were found to be severely diseased with large amounts of granulation tissue.   This necessitated excision of all of the mitral valve leaflets and chordal structures.  Interrupted horizontal mattress sutures of 2-0 Ethibond were placed in the mitral valve annulus and then through the sewing cuff of a 31 mm ats mechanical prosthesis.   The valve was lowered in the place and with the aid of the cor knot device the sutures were tied.Left atriotomy was closed with 2 layers of 4-0 Prolene in the usual fashion.  The heart was appropriately de-aired.  It should be noted that CO2 insufflation  was used in the pericardial well throughout the entire case.  The patient was weaned and discontinued from cardiopulmonary bypass.  Right atrial and ventricular epicardial pacing wires were placed as were 2-24 Hungarian Ruben  drains, 1 in the left chest  and 1 in the mediastinum.  Protamine was administered the patient was decannulated.  Sternum was closed with stainless steel wire in the Van Horne sternal plating system.  Subcutaneous tissues were closed in layers with Vicryl.  Patient tolerated the procedure  well will be delivered to the recovery room in stable condition.  Op Note 2/8 CTS                                                                                Provider, please clarify the procedure(es) performed on 02/08/23 associated with above Op Note.    [   ]  Only, High-risk mitral valve replacement with a number 31 Medtronic ats mechanical prosthesis.  (Left atrial appendage ligation with a number 40 AtriClip device, was not performed.)     [   ] Both, High-risk mitral valve replacement with a number 31 Medtronic ats mechanical prosthesis and Left atrial appendage ligation with a number 40 AtriClip device.     [   ] Other (please specify): ____________

## 2023-03-10 LAB
FIO2: 100
FIO2: 55
GLUCOSE SERPL-MCNC: 128 MG/DL (ref 70–110)
GLUCOSE SERPL-MCNC: 133 MG/DL (ref 70–110)
GLUCOSE SERPL-MCNC: 137 MG/DL (ref 70–110)
GLUCOSE SERPL-MCNC: 147 MG/DL (ref 70–110)
HCO3 UR-SCNC: 23.7 MMOL/L (ref 24–28)
HCO3 UR-SCNC: 24.9 MMOL/L (ref 24–28)
HCO3 UR-SCNC: 26.3 MMOL/L (ref 24–28)
HCO3 UR-SCNC: 27.1 MMOL/L (ref 24–28)
HCT VFR BLD CALC: 24 %PCV (ref 36–54)
HCT VFR BLD CALC: 29 %PCV (ref 36–54)
HGB BLD-MCNC: 10 G/DL
HGB BLD-MCNC: 8 G/DL
PCO2 BLDA: 39.6 MMHG (ref 35–45)
PCO2 BLDA: 41.5 MMHG (ref 35–45)
PCO2 BLDA: 42.6 MMHG (ref 35–45)
PCO2 BLDA: 42.9 MMHG (ref 35–45)
PH SMN: 7.35 [PH] (ref 7.35–7.45)
PH SMN: 7.4 [PH] (ref 7.35–7.45)
PH SMN: 7.41 [PH] (ref 7.35–7.45)
PH SMN: 7.42 [PH] (ref 7.35–7.45)
PO2 BLDA: 293 MMHG (ref 80–100)
PO2 BLDA: 420 MMHG (ref 80–100)
PO2 BLDA: 424 MMHG (ref 80–100)
PO2 BLDA: 45 MMHG (ref 40–60)
POC BE: -2 MMOL/L
POC BE: 0 MMOL/L
POC BE: 1 MMOL/L
POC BE: 3 MMOL/L
POC IONIZED CALCIUM: 1.13 MMOL/L (ref 1.06–1.42)
POC IONIZED CALCIUM: 1.14 MMOL/L (ref 1.06–1.42)
POC IONIZED CALCIUM: 1.23 MMOL/L (ref 1.06–1.42)
POC IONIZED CALCIUM: 2.25 MMOL/L (ref 1.06–1.42)
POC PCO2 TEMP: 37.9 MMHG
POC PCO2 TEMP: 39.3 MMHG
POC PCO2 TEMP: 39.7 MMHG
POC PCO2 TEMP: 40.8 MMHG
POC PH TEMP: 7.38
POC PH TEMP: 7.41
POC PH TEMP: 7.42
POC PH TEMP: 7.44
POC PO2 TEMP: 288 MMHG
POC PO2 TEMP: 39 MMHG
POC PO2 TEMP: 414 MMHG
POC PO2 TEMP: 418 MMHG
POC SATURATED O2: 100 % (ref 95–100)
POC SATURATED O2: 78 % (ref 95–100)
POC TCO2: 25 MMOL/L (ref 24–29)
POC TCO2: 26 MMOL/L (ref 23–27)
POC TCO2: 28 MMOL/L (ref 23–27)
POC TCO2: 28 MMOL/L (ref 23–27)
POC TEMPERATURE: ABNORMAL
POTASSIUM BLD-SCNC: 3.5 MMOL/L (ref 3.5–5.1)
POTASSIUM BLD-SCNC: 3.9 MMOL/L (ref 3.5–5.1)
POTASSIUM BLD-SCNC: 3.9 MMOL/L (ref 3.5–5.1)
POTASSIUM BLD-SCNC: 4 MMOL/L (ref 3.5–5.1)
SAMPLE: ABNORMAL
SODIUM BLD-SCNC: 138 MMOL/L (ref 136–145)
SODIUM BLD-SCNC: 139 MMOL/L (ref 136–145)
SODIUM BLD-SCNC: 139 MMOL/L (ref 136–145)
SODIUM BLD-SCNC: 140 MMOL/L (ref 136–145)

## 2023-03-13 ENCOUNTER — TELEPHONE (OUTPATIENT)
Dept: CARDIAC SURGERY | Facility: CLINIC | Age: 53
End: 2023-03-13

## 2023-03-13 ENCOUNTER — OFFICE VISIT (OUTPATIENT)
Dept: CARDIAC SURGERY | Facility: CLINIC | Age: 53
End: 2023-03-13
Payer: MEDICAID

## 2023-03-13 VITALS
DIASTOLIC BLOOD PRESSURE: 86 MMHG | BODY MASS INDEX: 17.78 KG/M2 | SYSTOLIC BLOOD PRESSURE: 132 MMHG | HEIGHT: 71 IN | HEART RATE: 39 BPM | WEIGHT: 127 LBS

## 2023-03-13 DIAGNOSIS — Z95.2 S/P MVR (MITRAL VALVE REPLACEMENT): ICD-10-CM

## 2023-03-13 LAB
GLUCOSE SERPL-MCNC: 125 MG/DL (ref 70–110)
HCO3 UR-SCNC: 22.8 MMOL/L (ref 24–28)
HCT VFR BLD CALC: 22 %PCV (ref 36–54)
HGB BLD-MCNC: 8 G/DL
PCO2 BLDA: 34.7 MMHG (ref 35–45)
PH SMN: 7.43 [PH] (ref 7.35–7.45)
PO2 BLDA: 360 MMHG (ref 80–100)
POC BE: -2 MMOL/L
POC IONIZED CALCIUM: 1.37 MMOL/L (ref 1.06–1.42)
POC SATURATED O2: 100 % (ref 95–100)
POC TCO2: 24 MMOL/L (ref 23–27)
POTASSIUM BLD-SCNC: 4 MMOL/L (ref 3.5–5.1)
SAMPLE: ABNORMAL
SODIUM BLD-SCNC: 142 MMOL/L (ref 136–145)

## 2023-03-13 PROCEDURE — 99024 PR POST-OP FOLLOW-UP VISIT: ICD-10-PCS | Mod: ,,, | Performed by: THORACIC SURGERY (CARDIOTHORACIC VASCULAR SURGERY)

## 2023-03-13 PROCEDURE — 99024 POSTOP FOLLOW-UP VISIT: CPT | Mod: ,,, | Performed by: THORACIC SURGERY (CARDIOTHORACIC VASCULAR SURGERY)

## 2023-03-13 NOTE — TELEPHONE ENCOUNTER
----- Message from Ashlie Hanson sent at 3/13/2023  2:26 PM CDT -----  [facility]- INR 6.8 and is taking Wararin 10mg at night    [phone]

## 2023-03-13 NOTE — TELEPHONE ENCOUNTER
Returned call to nurse re: elevated PT value. Instructed to call Dr. GÓMEZ Rendon's office, whom is the cardiologist managing his coumadin to get orders. Sameer verbalized understanding and stated she would call his office. LADONNA Sims RN

## 2023-03-23 ENCOUNTER — DOCUMENT SCAN (OUTPATIENT)
Dept: HOME HEALTH SERVICES | Facility: HOSPITAL | Age: 53
End: 2023-03-23
Payer: MEDICAID

## 2023-03-27 PROBLEM — Z95.2 S/P MVR (MITRAL VALVE REPLACEMENT): Status: ACTIVE | Noted: 2023-03-27

## 2023-03-27 NOTE — ASSESSMENT & PLAN NOTE
Doing well without complaints.  Anticoagulation and antiarrhythmics per Cardiology.  Plan per Cardiology.

## 2023-03-27 NOTE — PROGRESS NOTES
Patient is status post MVR (M)  doing well without complaints   Vital signs stable/afebrile   Regular rate and rhythm without murmurs rubs or gallops  Coarse breath sounds bilaterally   Wounds CDI   Echocardiogram reviewed   A/P:  Doing well without complaints.  Plan per Cardiology

## 2023-04-04 ENCOUNTER — TELEPHONE (OUTPATIENT)
Dept: CARDIAC SURGERY | Facility: CLINIC | Age: 53
End: 2023-04-04
Payer: MEDICAID

## 2023-04-04 NOTE — TELEPHONE ENCOUNTER
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  Verb understanding.   ----- Message from Ashlie Hanson sent at 4/4/2023 10:07 AM CDT -----  Pt wants to know when can he go back to work.    [phone]

## 2023-04-17 ENCOUNTER — CLINICAL SUPPORT (OUTPATIENT)
Dept: INTERNAL MEDICINE | Facility: CLINIC | Age: 53
End: 2023-04-17
Payer: MEDICAID

## 2023-04-17 DIAGNOSIS — Z95.2 HISTORY OF MITRAL VALVE REPLACEMENT: Primary | ICD-10-CM

## 2023-04-17 NOTE — PROGRESS NOTES
Initial Coumadin clinic visit patient and family attended educated on Coumadin therapy,diet and drug interactions verbalizes understanding instructions need reinforcement. Poc Pt/Inr results 4.0/48.2 (Hx of MVR normal Inr results 2.5-3.5). Unsure of a cause instructed to hold today's Warfarin dose and go to the ER for bleeding issues resume adjusted Warfarin dose 4/18/23 verbal and written instructions given to the patient verbalizes understanding instructions.Takes Warfarin 8 mg (2-4 mg tablets). Patient weight 127 pounds. Return to clinic 4/24/23.

## 2023-04-24 ENCOUNTER — CLINICAL SUPPORT (OUTPATIENT)
Dept: INTERNAL MEDICINE | Facility: CLINIC | Age: 53
End: 2023-04-24
Payer: MEDICAID

## 2023-04-24 DIAGNOSIS — Z95.2 HISTORY OF MITRAL VALVE REPLACEMENT: Primary | ICD-10-CM

## 2023-04-24 NOTE — PROGRESS NOTES
Poc Pt/Inr results 3.3/39.8 (Hx of MVR normal Inr results 2.5-3.5). Inr level within normal range.Takes Warfarin 8 mg (2-4 mg tablets). Patient weight 126 pounds. Return to clinic 5/22/23.

## 2023-05-02 ENCOUNTER — HOSPITAL ENCOUNTER (OUTPATIENT)
Facility: HOSPITAL | Age: 53
Discharge: HOME | End: 2023-05-02
Attending: FAMILY MEDICINE | Admitting: INTERNAL MEDICINE
Payer: MEDICAID

## 2023-05-02 VITALS
WEIGHT: 126 LBS | DIASTOLIC BLOOD PRESSURE: 86 MMHG | SYSTOLIC BLOOD PRESSURE: 133 MMHG | HEIGHT: 60 IN | BODY MASS INDEX: 24.74 KG/M2 | TEMPERATURE: 97 F | OXYGEN SATURATION: 76 % | HEART RATE: 64 BPM | RESPIRATION RATE: 25 BRPM

## 2023-05-02 DIAGNOSIS — I47.9 PAROXYSMAL TACHYCARDIA: ICD-10-CM

## 2023-05-02 DIAGNOSIS — Z95.2 S/P MVR (MITRAL VALVE REPLACEMENT): ICD-10-CM

## 2023-05-02 DIAGNOSIS — R00.0 TACHYCARDIA: ICD-10-CM

## 2023-05-02 DIAGNOSIS — R07.9 CHEST PAIN: ICD-10-CM

## 2023-05-02 DIAGNOSIS — R00.2 PALPITATIONS: Primary | ICD-10-CM

## 2023-05-02 LAB
ALBUMIN SERPL-MCNC: 3.9 G/DL (ref 3.5–5)
ALBUMIN/GLOB SERPL: 1.1 RATIO (ref 1.1–2)
ALP SERPL-CCNC: 109 UNIT/L (ref 40–150)
ALT SERPL-CCNC: 19 UNIT/L (ref 0–55)
AST SERPL-CCNC: 21 UNIT/L (ref 5–34)
AV INDEX (PROSTH): 0.53
AV MEAN GRADIENT: 3 MMHG
AV PEAK GRADIENT: 7 MMHG
AV VALVE AREA: 1.59 CM2
AV VELOCITY RATIO: 0.58
BASOPHILS # BLD AUTO: 0.03 X10(3)/MCL
BASOPHILS NFR BLD AUTO: 0.4 %
BILIRUBIN DIRECT+TOT PNL SERPL-MCNC: 0.4 MG/DL
BNP BLD-MCNC: 100.9 PG/ML
BSA FOR ECHO PROCEDURE: 1.54 M2
BUN SERPL-MCNC: 20.2 MG/DL (ref 8.4–25.7)
CALCIUM SERPL-MCNC: 9.6 MG/DL (ref 8.4–10.2)
CHLORIDE SERPL-SCNC: 107 MMOL/L (ref 98–107)
CO2 SERPL-SCNC: 25 MMOL/L (ref 22–29)
CREAT SERPL-MCNC: 1.04 MG/DL (ref 0.73–1.18)
CV ECHO LV RWT: 0.35 CM
DOP CALC AO PEAK VEL: 1.29 M/S
DOP CALC AO VTI: 20.1 CM
DOP CALC LVOT AREA: 3 CM2
DOP CALC LVOT DIAMETER: 1.96 CM
DOP CALC LVOT PEAK VEL: 0.75 M/S
DOP CALC LVOT STROKE VOLUME: 31.97 CM3
DOP CALC MV VTI: 49.6 CM
DOP CALCLVOT PEAK VEL VTI: 10.6 CM
E WAVE DECELERATION TIME: 390.23 MSEC
E/A RATIO: 1.92
ECHO LV POSTERIOR WALL: 0.89 CM (ref 0.6–1.1)
EJECTION FRACTION: 36 %
EOSINOPHIL # BLD AUTO: 0.19 X10(3)/MCL (ref 0–0.9)
EOSINOPHIL NFR BLD AUTO: 2.6 %
ERYTHROCYTE [DISTWIDTH] IN BLOOD BY AUTOMATED COUNT: 16.8 % (ref 11.5–17)
EST. AVERAGE GLUCOSE BLD GHB EST-MCNC: 102.5 MG/DL
FRACTIONAL SHORTENING: 19 % (ref 28–44)
GFR SERPLBLD CREATININE-BSD FMLA CKD-EPI: >60 MLS/MIN/1.73/M2
GLOBULIN SER-MCNC: 3.6 GM/DL (ref 2.4–3.5)
GLUCOSE SERPL-MCNC: 101 MG/DL (ref 74–100)
HBA1C MFR BLD: 5.2 %
HCT VFR BLD AUTO: 39.7 % (ref 42–52)
HGB BLD-MCNC: 13.6 G/DL (ref 14–18)
IMM GRANULOCYTES # BLD AUTO: 0.02 X10(3)/MCL (ref 0–0.04)
IMM GRANULOCYTES NFR BLD AUTO: 0.3 %
INTERVENTRICULAR SEPTUM: 1.1 CM (ref 0.6–1.1)
LEFT ATRIUM SIZE: 4.92 CM
LEFT INTERNAL DIMENSION IN SYSTOLE: 4.09 CM (ref 2.1–4)
LEFT VENTRICLE DIASTOLIC VOLUME INDEX: 79.63 ML/M2
LEFT VENTRICLE DIASTOLIC VOLUME: 121.03 ML
LEFT VENTRICLE MASS INDEX: 121 G/M2
LEFT VENTRICLE SYSTOLIC VOLUME INDEX: 48.6 ML/M2
LEFT VENTRICLE SYSTOLIC VOLUME: 73.83 ML
LEFT VENTRICULAR INTERNAL DIMENSION IN DIASTOLE: 5.05 CM (ref 3.5–6)
LEFT VENTRICULAR MASS: 183.74 G
LV LATERAL E/E' RATIO: 7.23 M/S
LVOT MG: 1.41 MMHG
LVOT MV: 0.57 CM/S
LYMPHOCYTES # BLD AUTO: 2.06 X10(3)/MCL (ref 0.6–4.6)
LYMPHOCYTES NFR BLD AUTO: 27.7 %
MAGNESIUM SERPL-MCNC: 1.8 MG/DL (ref 1.6–2.6)
MCH RBC QN AUTO: 26.4 PG (ref 27–31)
MCHC RBC AUTO-ENTMCNC: 34.3 G/DL (ref 33–36)
MCV RBC AUTO: 77.1 FL (ref 80–94)
MONOCYTES # BLD AUTO: 0.57 X10(3)/MCL (ref 0.1–1.3)
MONOCYTES NFR BLD AUTO: 7.7 %
MV MEAN GRADIENT: 3 MMHG
MV PEAK A VEL: 0.49 M/S
MV PEAK E VEL: 0.94 M/S
MV PEAK GRADIENT: 9 MMHG
MV STENOSIS PRESSURE HALF TIME: 113.17 MS
MV VALVE AREA BY CONTINUITY EQUATION: 0.64 CM2
MV VALVE AREA P 1/2 METHOD: 1.94 CM2
NEUTROPHILS # BLD AUTO: 4.57 X10(3)/MCL (ref 2.1–9.2)
NEUTROPHILS NFR BLD AUTO: 61.3 %
NRBC BLD AUTO-RTO: 0 %
OHS LV EJECTION FRACTION SIMPSONS BIPLANE MOD: 4 %
PHOSPHATE SERPL-MCNC: 3.4 MG/DL (ref 2.3–4.7)
PISA TR MAX VEL: 2.37 M/S
PLATELET # BLD AUTO: 188 X10(3)/MCL (ref 130–400)
PLATELETS.RETICULATED NFR BLD AUTO: 5.9 % (ref 0.9–11.2)
PMV BLD AUTO: ABNORMAL FL
POTASSIUM SERPL-SCNC: 3.6 MMOL/L (ref 3.5–5.1)
PROT SERPL-MCNC: 7.5 GM/DL (ref 6.4–8.3)
RA PRESSURE: 3 MMHG
RBC # BLD AUTO: 5.15 X10(6)/MCL (ref 4.7–6.1)
RIGHT VENTRICULAR END-DIASTOLIC DIMENSION: 3.38 CM
SODIUM SERPL-SCNC: 142 MMOL/L (ref 136–145)
TDI LATERAL: 0.13 M/S
TR MAX PG: 22 MMHG
TROPONIN I SERPL-MCNC: 0.03 NG/ML (ref 0–0.04)
TSH SERPL-ACNC: 1.46 UIU/ML (ref 0.35–4.94)
TV REST PULMONARY ARTERY PRESSURE: 25 MMHG
WBC # SPEC AUTO: 7.44 X10(3)/MCL (ref 4.5–11.5)

## 2023-05-02 PROCEDURE — 83735 ASSAY OF MAGNESIUM: CPT | Performed by: FAMILY MEDICINE

## 2023-05-02 PROCEDURE — 96360 HYDRATION IV INFUSION INIT: CPT

## 2023-05-02 PROCEDURE — 84443 ASSAY THYROID STIM HORMONE: CPT | Performed by: FAMILY MEDICINE

## 2023-05-02 PROCEDURE — 83880 ASSAY OF NATRIURETIC PEPTIDE: CPT | Performed by: FAMILY MEDICINE

## 2023-05-02 PROCEDURE — 85025 COMPLETE CBC W/AUTO DIFF WBC: CPT | Performed by: FAMILY MEDICINE

## 2023-05-02 PROCEDURE — 25000003 PHARM REV CODE 250

## 2023-05-02 PROCEDURE — G0378 HOSPITAL OBSERVATION PER HR: HCPCS

## 2023-05-02 PROCEDURE — 93005 ELECTROCARDIOGRAM TRACING: CPT

## 2023-05-02 PROCEDURE — 99285 EMERGENCY DEPT VISIT HI MDM: CPT | Mod: 25

## 2023-05-02 PROCEDURE — 85610 PROTHROMBIN TIME: CPT | Performed by: FAMILY MEDICINE

## 2023-05-02 PROCEDURE — 84484 ASSAY OF TROPONIN QUANT: CPT | Performed by: FAMILY MEDICINE

## 2023-05-02 PROCEDURE — 80053 COMPREHEN METABOLIC PANEL: CPT | Performed by: FAMILY MEDICINE

## 2023-05-02 PROCEDURE — 84100 ASSAY OF PHOSPHORUS: CPT

## 2023-05-02 PROCEDURE — 25000003 PHARM REV CODE 250: Performed by: FAMILY MEDICINE

## 2023-05-02 PROCEDURE — 83036 HEMOGLOBIN GLYCOSYLATED A1C: CPT

## 2023-05-02 RX ORDER — DEXTROSE 40 %
30 GEL (GRAM) ORAL
Status: DISCONTINUED | OUTPATIENT
Start: 2023-05-02 | End: 2023-05-02 | Stop reason: HOSPADM

## 2023-05-02 RX ORDER — NALOXONE HCL 0.4 MG/ML
0.02 VIAL (ML) INJECTION
Status: DISCONTINUED | OUTPATIENT
Start: 2023-05-02 | End: 2023-05-02 | Stop reason: HOSPADM

## 2023-05-02 RX ORDER — METOPROLOL TARTRATE 25 MG/1
12.5 TABLET ORAL 2 TIMES DAILY
Status: DISCONTINUED | OUTPATIENT
Start: 2023-05-02 | End: 2023-05-02 | Stop reason: HOSPADM

## 2023-05-02 RX ORDER — SODIUM CHLORIDE 0.9 % (FLUSH) 0.9 %
10 SYRINGE (ML) INJECTION EVERY 12 HOURS PRN
Status: DISCONTINUED | OUTPATIENT
Start: 2023-05-02 | End: 2023-05-02 | Stop reason: HOSPADM

## 2023-05-02 RX ORDER — METOPROLOL TARTRATE 25 MG/1
12.5 TABLET ORAL 2 TIMES DAILY
Status: DISCONTINUED | OUTPATIENT
Start: 2023-05-02 | End: 2023-05-02

## 2023-05-02 RX ORDER — GLUCAGON 1 MG
1 KIT INJECTION
Status: DISCONTINUED | OUTPATIENT
Start: 2023-05-02 | End: 2023-05-02 | Stop reason: HOSPADM

## 2023-05-02 RX ORDER — METOPROLOL TARTRATE 1 MG/ML
5 INJECTION, SOLUTION INTRAVENOUS EVERY 5 MIN PRN
Status: DISCONTINUED | OUTPATIENT
Start: 2023-05-02 | End: 2023-05-02 | Stop reason: HOSPADM

## 2023-05-02 RX ORDER — METOPROLOL TARTRATE 25 MG/1
12.5 TABLET, FILM COATED ORAL 2 TIMES DAILY
Qty: 90 TABLET | Refills: 3 | Status: SHIPPED | OUTPATIENT
Start: 2023-05-02 | End: 2023-05-08 | Stop reason: ALTCHOICE

## 2023-05-02 RX ORDER — DEXTROSE 40 %
15 GEL (GRAM) ORAL
Status: DISCONTINUED | OUTPATIENT
Start: 2023-05-02 | End: 2023-05-02 | Stop reason: HOSPADM

## 2023-05-02 RX ORDER — AMLODIPINE BESYLATE 5 MG/1
5 TABLET ORAL DAILY
Status: DISCONTINUED | OUTPATIENT
Start: 2023-05-02 | End: 2023-05-02 | Stop reason: HOSPADM

## 2023-05-02 RX ADMIN — METOPROLOL TARTRATE 12.5 MG: 25 TABLET, FILM COATED ORAL at 08:05

## 2023-05-02 RX ADMIN — SODIUM CHLORIDE 1000 ML: 9 INJECTION, SOLUTION INTRAVENOUS at 05:05

## 2023-05-02 NOTE — Clinical Note
"Flako Power (Chris) was seen and treated in our emergency department on 5/2/2023.  He may return to work on 05/09/2023.       If you have any questions or concerns, please don't hesitate to call.      YARY Mishra RN    "

## 2023-05-02 NOTE — ED NOTES
Discharge paperwork completed. Reviewed with patient, verbalized understanding.  IV removed, pressure held, dressing applied.

## 2023-05-02 NOTE — PROGRESS NOTES
Spoke to Bharti with [employer] who agreed to have a rep meet with pt for M/D eligibility assessment in ED.

## 2023-05-02 NOTE — ED PROVIDER NOTES
Name: Flako Power   Age: 52 y.o.  Sex: male    Chief complaint:   Chief Complaint   Patient presents with    Palpitations     Pt reports to the ed c/o palpitations (x)1 week. Also states he has a mechanical valve and is currently on blood thinners. Denies chest pain at this time. Vss. 12 lead ekg obtained.      Patient arrived with: Private  History obtained from: Patient    Subjective:   52-year-old male with a history of hypertension, mitral valve replacement (currently on Coumadin) that presents to emergency department for palpitations.  Patient said he woke up overnight with palpitations.  You also has shortness of breath that lasted for few seconds and then resolved.  Patient said he is had intermittent palpitations that started after he had a surgery and February 2023.  His cardiologist said originally discussed doing a Holter monitor but because of insurance issues he has been unable to get that.  He says a few episodes of palpitations since the surgery.  Did not feel lightheaded, no syncopal episode.  Denies chest pain.  Denies fever, chills, sweats, cough, sore throat.  Has a mild epigastric pain which is now resolved.  Denies nausea, vomiting, diarrhea, dysuria, hematuria.          Past Medical History:   Diagnosis Date    Cough     Hypertension     Infective endocarditis     Mitral incompetence     PICC (peripherally inserted central catheter) in place      Past Surgical History:   Procedure Laterality Date    ABDOMINAL SURGERY      LEFT HEART CATHETERIZATION N/A 01/19/2023    Procedure: Left heart cath;  Surgeon: Speedy Rendon Jr., MD;  Location: [facility] CATH LAB;  Service: Cardiology;  Laterality: N/A;  Diagnostic Procedure Only- Severe MR Awaiting Surgery.    MITRAL VALVE REPLACEMENT N/A 2/8/2023    Procedure: REPLACEMENT, MITRAL VALVE;  Surgeon: Jose Ramon Rodrigues MD;  Location: [facility] OR;  Service: Cardiology;  Laterality: N/A;  ECHO NOTIFIED    TONSILLECTOMY       Social History     Socioeconomic  History    Marital status: Single   Tobacco Use    Smoking status: Never    Smokeless tobacco: Never   Substance and Sexual Activity    Alcohol use: Not Currently    Drug use: Never   Social History Narrative    ** Merged History Encounter **          Review of patient's allergies indicates:  No Known Allergies     Review of Systems   Constitutional:  Negative for diaphoresis and fever.   HENT:  Negative for congestion and sore throat.    Eyes:  Negative for pain and discharge.   Respiratory:  Positive for shortness of breath (resolved). Negative for cough.    Cardiovascular:  Positive for palpitations. Negative for chest pain.   Gastrointestinal:  Negative for diarrhea and vomiting.   Genitourinary:  Negative for dysuria and hematuria.   Musculoskeletal:  Negative for back pain and myalgias.   Skin:  Negative for itching and rash.   Neurological:  Negative for weakness and headaches.        Objective:     Vitals:    05/02/23 0440   BP:    Pulse: 69   Resp:    Temp:         Physical Exam  Constitutional:       Appearance: He is not toxic-appearing.   HENT:      Head: Normocephalic and atraumatic.   Cardiovascular:      Rate and Rhythm: Regular rhythm. Tachycardia present.      Pulses: Normal pulses.   Pulmonary:      Effort: Pulmonary effort is normal.      Breath sounds: Normal breath sounds.   Abdominal:      General: Abdomen is flat. Bowel sounds are normal.      Palpations: Abdomen is soft.      Tenderness: There is no right CVA tenderness or left CVA tenderness.   Musculoskeletal:         General: No deformity. Normal range of motion.      Cervical back: Normal range of motion and neck supple.      Right lower leg: No edema.      Left lower leg: No edema.   Skin:     General: Skin is warm and dry.   Neurological:      General: No focal deficit present.      Mental Status: He is alert and oriented to person, place, and time.   Psychiatric:         Mood and Affect: Mood normal.         Behavior: Behavior normal.         Records:  Nursing records and triage records reviewed  Prior records reviewed    Medical decision making:   Patient presents to the emergency department for palpitations.  Had shortness of breath which resolved after few seconds.  Patient is nontoxic appearing.  Afebrile, tachycardic to 112, normotensive, saturating well on room air.  Physical exam within normal limits.  Will get cardiac workup for further evaluation.  Will give IV fluids.    ED Course as of 05/02/23 0658   Tue May 02, 2023   0444 EKG is nonischemic [RK]   0457 My interpretation of chest x-ray shows no acute findings  [RK]   0657 Troponin negative.  BNP within normal limits.  No leukocytosis, anemia, thrombocytopenia.  No severe electrolyte abnormality.  No YULIA hyperglycemia.  Liver enzymes are within normal limits.  INR elevated at 4.13.      Patient's heart rate was varying from .  Still continues to show P waves so consistent with a sinus rhythm.  Had multiple PVCs.       Will be admitted to the hospital for further management.  Discussed with medicine team. [RK]      ED Course User Index  [RK] Bc Clay MD          EKG:  ECG Results              EKG 12-lead (Preliminary result)  Result time 05/02/23 04:44:53      Wet Read by Bc Clay MD (05/02/23 04:44:53, [facility], Emergency Medicine)    Sinus tachycardia at a rate of 127, no signs of ST elevation or depression, left axis deviation, normal intervals with a QTC of 456                                     Procedures       Diagnosis:  Final diagnoses:  [R07.9] Chest pain  [R00.2] Palpitations (Primary)          Bc Clay M.D.  Emergency Medicine Physician     (Please note that this chart was completed via voice to text dictation. There may be typographical errors or substitutions that are unintentional, or uncorrected. Every attempt was made to proofread the chart prior to completion. If there are any questions, please contact the  physician for final clarification).         Bc Clay MD  05/02/23 0658

## 2023-05-02 NOTE — H&P
[facility] Internal Medicine History and Physical     Date of Admit: 5/2/2023    Chief Complaint     Palpitations (Pt reports to the ed c/o palpitations (x)1 week. Also states he has a mechanical valve and is currently on blood thinners. Denies chest pain at this time. Vss. 12 lead ekg obtained.)      Subjective:      History of Present Illness:  Flako Power is a 52 y.o. male who has PMHx of HTN, MR and Infective endocarditis s/p MR repair on 2/8/23, T8-T9 disckitis/osteomylitis s/p long-term ceftrixaxone (end date 3/3). The patient presented to [facility] ED on 5/2/2023 with a primary complaint of Palpitations.  Patient reports these palpitations have been ongoing for the past month.  Occur intermittently, denies any chest pain with palpitations.  Does endorse some intermittent shortness of breath on exertion.  Reports of dizziness yesterday but no other symptoms reported.  Has been compliant with his Coumadin.  He is also taking amlodipine where his blood pressure.  Patient ran out of his metoprolol 12.5 mg b.i.d. 1 month ago but was unable to get refills for pharmacy.  No other medication changes have been made since his last visit to cardiologist.    Of note, patient is status post mitral valve repair in February 8th with Dr. Rodrigues.  He followed up postop 2 weeks after.  He followed up with Dr. Rendon for Cardiology outpatient about 1 month ago.    In the emergency room, patient had initial blood pressure of 142/88, heart rate of 112, respiratory rate of 18, SpO2 99% on room air, afebrile 97.3 ° F. lab work revealing mild normocytic anemia 13.6 hemoglobin, MCV 77.1.  CMP unremarkable.  BNP of 100.9.  Troponin of 0.030.  TSH 0.463.  Chest x-ray no acute abnormality seen.  EKG showing sinus tachycardia rate of 127, with PACs.  Given 1 L normal saline fluid bolus.  Admitted to medicine for further workup.    Past Medical History:  Past Medical History:   Diagnosis Date    Cough     Hypertension     Infective  endocarditis     Mitral incompetence     PICC (peripherally inserted central catheter) in place        Past Surgical History:  Past Surgical History:   Procedure Laterality Date    ABDOMINAL SURGERY      LEFT HEART CATHETERIZATION N/A 01/19/2023    Procedure: Left heart cath;  Surgeon: Speedy Rendon Jr., MD;  Location: [facility] CATH LAB;  Service: Cardiology;  Laterality: N/A;  Diagnostic Procedure Only- Severe MR Awaiting Surgery.    MITRAL VALVE REPLACEMENT N/A 2/8/2023    Procedure: REPLACEMENT, MITRAL VALVE;  Surgeon: Jose Ramon Rodrigues MD;  Location: [facility] OR;  Service: Cardiology;  Laterality: N/A;  ECHO NOTIFIED    TONSILLECTOMY         Allergies:  Review of patient's allergies indicates:  No Known Allergies    Home Medications:  Prior to Admission medications    Medication Sig Start Date End Date Taking? Authorizing Provider   amLODIPine (NORVASC) 5 MG tablet Take 1 tablet (5 mg total) by mouth once daily. 1/26/23 1/26/24  Alexia Alvarez DO   baclofen (LIORESAL) 10 MG tablet Take 1 tablet (10 mg total) by mouth 3 (three) times daily as needed (muscle spasms). 12/20/22 12/27/22  Garfield Echeverria Jr., FNP   cefTRIAXone (ROCEPHIN) 2 gram/50 mL IVPB Inject 50 mLs (2 g total) into the vein every 12 (twelve) hours. 1/25/23   Alexia Alvarez DO   diclofenac (VOLTAREN) 75 MG EC tablet Take 1 tablet (75 mg total) by mouth 2 (two) times daily. 12/16/22   Mandeep Qureshi MD   metoprolol tartrate (LOPRESSOR) 25 MG tablet Take 0.5 tablets (12.5 mg total) by mouth 2 (two) times daily. 2/19/23   ARNOL Walton   mupirocin (BACTROBAN) 2 % ointment by Nasal route once daily. Apply to inside of both nostrils the night before surgery and the morning of surgery. 1/30/23   Jose Ramon Rodrigues MD   warfarin (COUMADIN) 4 MG tablet Take 2 tabs (8 mg) oral at bedtime 4/17/23   Magali Barboza MD       Family History:  Family History   Problem Relation Age of Onset    Diabetes type I Mother     Breast cancer Mother     Hypertension Mother   "   No Known Problems Father        Social History:  Social History     Tobacco Use    Smoking status: Never    Smokeless tobacco: Never   Substance Use Topics    Alcohol use: Not Currently    Drug use: Never       Review of Systems:  Review of Systems   Constitutional:  Negative for chills and fever.   Respiratory:  Positive for shortness of breath. Negative for wheezing.    Cardiovascular:  Positive for palpitations. Negative for chest pain.   Gastrointestinal:  Negative for abdominal pain, diarrhea, heartburn, nausea and vomiting.   Musculoskeletal:  Negative for back pain.        Objective:   Last 24 Hour Vital Signs:  Vitals  BP: (!) 144/83  Temp: 97.3 °F (36.3 °C)  Temp Source: Oral  Pulse: 101  Resp: 19  SpO2: 100 %  Height: 4' 11.06" (150 cm)  Weight: 57.3 kg (126 lb 5.2 oz)    Physical Examination:  Physical Exam  Vitals and nursing note reviewed.   Constitutional:       General: He is not in acute distress.     Appearance: Normal appearance. He is not ill-appearing.   HENT:      Head: Normocephalic and atraumatic.   Cardiovascular:      Rate and Rhythm: Regular rhythm. Tachycardia present.      Heart sounds: Murmur heard.   Pulmonary:      Effort: Pulmonary effort is normal. No respiratory distress.      Breath sounds: Normal breath sounds. No stridor.   Abdominal:      General: Abdomen is flat. Bowel sounds are normal. There is no distension.      Palpations: Abdomen is soft.      Tenderness: There is no abdominal tenderness.   Musculoskeletal:         General: No swelling or tenderness. Normal range of motion.   Skin:     General: Skin is warm and dry.      Capillary Refill: Capillary refill takes less than 2 seconds.   Neurological:      General: No focal deficit present.      Mental Status: He is alert and oriented to person, place, and time. Mental status is at baseline.           Laboratory:  Most Recent Data:  CMP:  Recent Labs   Lab 05/02/23  0508   CHLORIDE 107   CO2 25   BUN 20.2   CREATININE 1.04 "   GLUCOSE 101*   ALBUMIN 3.9   AST 21   ALT 19   ALKPHOS 109     CBC:  Recent Labs   Lab 05/02/23  0508   WBC 7.44   ABSNEUTRO 4.57   RBC 5.15   HGB 13.6*   HCT 39.7*   MCV 77.1*   RDW 16.8     Coags:   Lab Results   Component Value Date    INR 4.13 (H) 05/02/2023    PROTIME 38.7 05/02/2023    PTT 30.5 02/08/2023     FLP: No results found for: CHOL, HDL, LDLCALC, TRIG, CHOLHDL  DM:   Lab Results   Component Value Date    HGBA1C 5.2 05/02/2023    HGBA1C 5.6 01/17/2023    CREATININE 1.04 05/02/2023     Thyroid:   Lab Results   Component Value Date    TSH 1.463 05/02/2023     Anemia: No results found for: IRON, TIBC, FERRITIN, AZWUDGAB50, FOLATE  Cardiac:   Lab Results   Component Value Date    TROPONINI 0.030 05/02/2023    .9 (H) 05/02/2023     Urinalysis:   Lab Results   Component Value Date    PHUA 7.5 02/06/2023    UROBILINOGEN 0.2 02/06/2023    WBCUA <5 02/06/2023       Trended Cardiac Data:  Recent Labs   Lab 05/02/23  0508   TROPONINI 0.030   .9*         Radiology:  Imaging Results              X-Ray Chest PA And Lateral (Final result)  Result time 05/02/23 06:58:42      Final result by Todd Chaney MD (05/02/23 06:58:42)                   Impression:      No acute cardiopulmonary process identified.      Electronically signed by: Todd Chaney  Date:    05/02/2023  Time:    06:58               Narrative:    EXAMINATION:  XR CHEST PA AND LATERAL    CLINICAL HISTORY:  Chest pain;    TECHNIQUE:  Two-view    COMPARISON:  February 11, 2023.    FINDINGS:  Cardiopericardial silhouette is within normal limits.  Prosthetic cardiac valve and there sternotomy changes.  Lungs are without dense focal or segmental consolidation, congestion, pleural effusion or pneumothorax.                        Wet Read by Bc Clay MD (05/02/23 04:57:07, [facility], Emergency Medicine)    No consolidation, cardiomegaly, or pneumothorax.                                       Antibiotics:           Assessment & Plan:     Sinus tachycardia  EKG w/ sinus tachycardia rate of 127, PAC's.  BNP mildly elevated, trop negative, TSH&lytes wnl  Repeat echo pending  Restart lopressor 12.5 mg BID   PRN lopressor push 5 mg Q5min for max dose of 3 in 24 hours  Plan for holtermonitor at home  Hx of endocarditis s/p MV replacement  INR of 4.13 on admission; goal of 2.5 to 3.0  Warfarin dose decerased from 10 mg to 8 mg  Will hold warfarin today given pt supra-therapeutic   HTN  Continue home amlodipine 5 mg Qd  Hx of thoracic T8 diskitis/osteomylitis  S/p IV tx        DVT prophylaxis: on warfarin   Diet: Heart healthy  Pressors: None  Oxygenation: On room air  GI prophylaxis: None  Consults: None  Code status: Full code     Dispo: admitted for sinus tachycarida, echo pending. D/c later today        Tung Cosby DO  [employer] Internal Medicine PGY-1

## 2023-05-03 NOTE — DISCHARGE SUMMARY
[facility] Medicine Discharge Summary    Admitting Physician: Magali Barboza MD  Attending Physician: No att. providers found  Date of Admit: 5/2/2023  Date of Discharge: 5/3/2023    Condition: Good  Outcome: Condition has improved and patient is now ready for discharge.  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 male who has PMHx of HTN, MR and Infective endocarditis s/p MR repair on 2/8/23, T8-T9 disckitis/osteomylitis s/p long-term ceftrixaxone (end date 3/3). The patient presented to [facility] ED on 5/2/2023 with a primary complaint of Palpitations.  Patient reports these palpitations have been ongoing for the past month.  Occur intermittently, denies any chest pain with palpitations.  Does endorse some intermittent shortness of breath on exertion.  Reports of dizziness yesterday but no other symptoms reported.  Has been compliant with his Coumadin.  He is also taking amlodipine where his blood pressure.  Patient ran out of his metoprolol 12.5 mg b.i.d. 1 month ago but was unable to get refills for pharmacy.  No other medication changes have been made since his last visit to cardiologist.     Of note, patient is status post mitral valve repair in February 8th with Dr. Rodrigues.  He followed up postop 2 weeks after.  He followed up with Dr. Rendon for Cardiology outpatient about 1 month ago.     In the emergency room, patient had initial blood pressure of 142/88, heart rate of 112, respiratory rate of 18, SpO2 99% on room air, afebrile 97.3 ° F. lab work  "revealing mild normocytic anemia 13.6 hemoglobin, MCV 77.1.  CMP unremarkable.  BNP of 100.9.  Troponin of 0.030.  TSH 0.463.  Chest x-ray no acute abnormality seen.  EKG showing sinus tachycardia rate of 127, with PACs.  Given 1 L normal saline fluid bolus.  Admitted to medicine for further workup.    Hospital Course with Pertinent Findings:      Patient was administered home metoprolol with improvement of his tachycardia and resolution of palpitations.  Patient was provided refills of his medications.  He is to follow up with Cardiology outpatient at [facility].  He is also to establish care at [facility] for primary care.  He is scheduled a post barboza follow-up.  Holter monitor 72 hour was ordered.      To address at follow-up:  -The following labs are to be drawn at the Post Barboza visit:   -The following imaging studies are to be ordered at the post barboza visit:     At this time, Flako Power is determined to have maximized benefits of IP hospitalization. he is discharged in stable condition with OP f/u recommendations and instructions. All questions answered, and patient verbalized agreement with the POC. They were given return precautions prior to d/c including symptoms that should prompt return to ED or to call PCP. Total time spent of DC of 35 minutes.     Discharge physical exam:  Vitals  BP: 133/86  Temp: 97.3 °F (36.3 °C)  Temp Source: Oral  Pulse: 64  Resp: (!) 25  SpO2: (!) 76 %  Height: 4' 11" (149.9 cm)  Weight: 57.2 kg (126 lb)    General: Well nourished w/o distress  Neck: Full ROM; no lymphadenopathy  Pulm: CTA bilaterally, normal work of breathing  CV: S1, S2 w/o murmurs or gallops; no edema noted  GI: Soft with normal bowel sounds in all quadrants, no masses on palpation  MSK: Full ROM of all extremities and spine w/o limitation or discomfort  Derm: No rashes, abnormal bruising, or skin lesions  Neuro: AAOx4; CN II-XII intact; motor/sensory function intact        Discharge Medications:  "        Medication List        CONTINUE taking these medications      amLODIPine 5 MG tablet  Commonly known as: NORVASC  Take 1 tablet (5 mg total) by mouth once daily.     baclofen 10 MG tablet  Commonly known as: LIORESAL  Take 1 tablet (10 mg total) by mouth 3 (three) times daily as needed (muscle spasms).     diclofenac 75 MG EC tablet  Commonly known as: VOLTAREN  Take 1 tablet (75 mg total) by mouth 2 (two) times daily.     metoprolol tartrate 25 MG tablet  Commonly known as: LOPRESSOR  Take 0.5 tablets (12.5 mg total) by mouth 2 (two) times daily.     mupirocin 2 % ointment  Commonly known as: BACTROBAN  by Nasal route once daily. Apply to inside of both nostrils the night before surgery and the morning of surgery.     warfarin 4 MG tablet  Commonly known as: COUMADIN  Take 2 tabs (8 mg) oral at bedtime            STOP taking these medications      cefTRIAXone 2 gram/50 mL IVPB  Commonly known as: ROCEPHIN               Where to Get Your Medications        These medications were sent to [address] [address]      Phone: [phone]   metoprolol tartrate 25 MG tablet         Discharge Instructions:         Flako Power is being discharged Home or Self Care.    Discharge Procedure Orders   Ambulatory referral/consult to Internal Medicine   Standing Status: Future   Referral Priority: Routine Referral Type: Consultation   Referral Reason: Specialty Services Required   Referred to Provider: IVAN GUADALUPE Requested Specialty: Internal Medicine   Number of Visits Requested: 1     Ambulatory referral/consult to Cardiology   Standing Status: Future   Referral Priority: Routine Referral Type: Consultation   Referral Reason: Specialty Services Required   Requested Specialty: Cardiology   Number of Visits Requested: 1     Ambulatory referral/consult to Internal Medicine   Standing Status: Future   Referral Priority:  Routine Referral Type: Consultation   Referral Reason: Specialty Services Required Referral Location: [facility]   Requested Specialty: Internal Medicine   Number of Visits Requested: 1     Holter monitor - 72 hour   Standing Status: Future Standing Exp. Date: 05/02/24     Order Specific Question Answer Comments   Release to patient Immediate         Follow-Up Appointments:        TIME SPENT ON DISCHARGE: 35 minutes      Tung Cosby DO  [facility] Internal Medicine PGY-1

## 2023-05-04 DIAGNOSIS — I47.9 PAROXYSMAL TACHYCARDIA: Primary | ICD-10-CM

## 2023-05-08 ENCOUNTER — OFFICE VISIT (OUTPATIENT)
Dept: INTERNAL MEDICINE | Facility: CLINIC | Age: 53
End: 2023-05-08
Payer: MEDICAID

## 2023-05-08 VITALS
WEIGHT: 125 LBS | SYSTOLIC BLOOD PRESSURE: 133 MMHG | HEART RATE: 59 BPM | HEIGHT: 60 IN | DIASTOLIC BLOOD PRESSURE: 84 MMHG | BODY MASS INDEX: 24.54 KG/M2 | TEMPERATURE: 98 F | RESPIRATION RATE: 18 BRPM

## 2023-05-08 DIAGNOSIS — I50.40 COMBINED SYSTOLIC AND DIASTOLIC HEART FAILURE, UNSPECIFIED HF CHRONICITY: Primary | ICD-10-CM

## 2023-05-08 DIAGNOSIS — R00.2 PALPITATIONS: ICD-10-CM

## 2023-05-08 DIAGNOSIS — Z95.2 S/P MVR (MITRAL VALVE REPLACEMENT): ICD-10-CM

## 2023-05-08 PROCEDURE — 99213 OFFICE O/P EST LOW 20 MIN: CPT | Mod: PBBFAC

## 2023-05-08 PROCEDURE — 93005 ELECTROCARDIOGRAM TRACING: CPT

## 2023-05-08 RX ORDER — SACUBITRIL AND VALSARTAN 24; 26 MG/1; MG/1
1 TABLET, FILM COATED ORAL 2 TIMES DAILY
Qty: 30 TABLET | Refills: 3 | Status: SHIPPED | OUTPATIENT
Start: 2023-05-08 | End: 2023-06-19 | Stop reason: SDUPTHER

## 2023-05-08 RX ORDER — METOPROLOL SUCCINATE 25 MG/1
25 TABLET, EXTENDED RELEASE ORAL DAILY
Qty: 30 TABLET | Refills: 11 | Status: SHIPPED | OUTPATIENT
Start: 2023-05-08 | End: 2023-06-05 | Stop reason: SDUPTHER

## 2023-05-08 NOTE — LETTER
May 8, 2023    Flako Power  [address] [address]         [facility]  [address] [address]  Phone: [phone] May 8, 2023     Patient: Flako Power   YOB: 1970   Date of Visit: 5/8/2023       To Whom It May Concern:    It is my medical opinion that Flako Power can return to work without restrictions.  However, he has been counseled on cardiac symptoms and should slow down if he begins to experience any chest pain, shortness of breath, or increased palpitations.  He was given ED precautions to return if these symptoms return or worsen.      If you have any questions or concerns, please don't hesitate to call.    Sincerely,        Cheyenne Jean MD

## 2023-05-08 NOTE — PROGRESS NOTES
[facility] INTERNAL MEDICINE  Post-wards visit     SUBJECTIVE:      Chief Complaint: ER FOLLOW UP:  PALPIATIONS     HPI: Flako Power is a 52 y.o. yo male w/ PMH of HTN and endocarditis s/p MVR 2/8/2023 who presents for post-wards follow-up.  Patient was seen in the ED on 5/2/2023 with complaints of palpitations.  At that time an EKG was performed showing sinus tachycardia with premature atrial complexes with aberrant conduction.  He was placed back on Metoprolol tartrate which patient was supposed to be taking at home and his symptoms improved.  Echo was performed which showed EF 36% w/ grade 1 diastolic dysfunction.  Patient presents today s/p hospitalization.  He states his symptoms have improved although he does still endorse episodes of palpitations, however, they are not as strong as they were before coming to the hospital. He denies any chest pain, shortness of breath, LE swelling, orthopnea, dizziness, or headaches.  He follows with coumadin clinic here at [facility] was last seen 4/24/2023.  He is also requesting to establish with PCP.  Denies any tobacco, alcohol, or illicit drug use.  Has been compliant with medication regimen and is still awaiting holter monitor.  He has an appt to f/u with cardiology in June and has been following [facility] s/p MVR (last seen 3/13/2023).      Past Medical History:   has a past medical history of Cough, Hypertension, Infective endocarditis, Mitral incompetence, and PICC (peripherally inserted central catheter) in place.     Past Surgical History:   has a past surgical history that includes Left heart catheterization (N/A, 01/19/2023); Abdominal surgery; Tonsillectomy; and Mitral valve replacement (N/A, 2/8/2023).     Family History:  family history includes Breast cancer in his mother; Diabetes type I in his mother; Hypertension in his mother; No Known Problems in his father.     Social History:   reports that he has never smoked. He has never used smokeless tobacco. He reports that  he does not currently use alcohol. He reports that he does not use drugs.     Allergies:  has No Known Allergies.     Home Medications:  Prior to Admission medications    Medication Sig Start Date End Date Taking? Authorizing Provider   amLODIPine (NORVASC) 5 MG tablet Take 1 tablet (5 mg total) by mouth once daily. 1/26/23 1/26/24 Yes Alexia Alvarez, DO   warfarin (COUMADIN) 4 MG tablet Take 2 tabs (8 mg) oral at bedtime 4/17/23  Yes Magali Barboza MD   metoprolol tartrate (LOPRESSOR) 25 MG tablet Take 0.5 tablets (12.5 mg total) by mouth 2 (two) times daily. 5/2/23 5/8/23 Yes Tung Cosby, DO   baclofen (LIORESAL) 10 MG tablet Take 1 tablet (10 mg total) by mouth 3 (three) times daily as needed (muscle spasms). 12/20/22 12/27/22  Garfield Echeverria Jr., FNP   diclofenac (VOLTAREN) 75 MG EC tablet Take 1 tablet (75 mg total) by mouth 2 (two) times daily.  Patient not taking: Reported on 5/8/2023 12/16/22   Mandeep Qureshi MD   metoprolol succinate (TOPROL-XL) 25 MG 24 hr tablet Take 1 tablet (25 mg total) by mouth once daily. 5/8/23 5/7/24  Cheyenne Jean MD   mupirocin (BACTROBAN) 2 % ointment by Nasal route once daily. Apply to inside of both nostrils the night before surgery and the morning of surgery.  Patient not taking: Reported on 5/8/2023 1/30/23   Jose Ramon Rodrigues MD   sacubitriL-valsartan (ENTRESTO) 24-26 mg per tablet Take 1 tablet by mouth 2 (two) times daily. 5/8/23   Cheyenne Jean MD     Review of Systems   Constitutional:  Negative for chills and fever.   Eyes:  Negative for blurred vision and double vision.   Respiratory:  Negative for cough and hemoptysis.    Cardiovascular:  Positive for palpitations. Negative for chest pain, orthopnea, claudication and leg swelling.   Gastrointestinal:  Negative for abdominal pain, blood in stool, constipation, diarrhea, heartburn, melena, nausea and vomiting.   Genitourinary:  Negative for dysuria.   Musculoskeletal:  Negative for myalgias and neck pain.  "  Neurological:  Negative for dizziness and headaches.       OBJECTIVE:     Vital signs:   /84 (BP Location: Right arm, Patient Position: Sitting, BP Method: Medium (Manual))   Pulse (!) 59   Temp 97.5 °F (36.4 °C) (Oral)   Resp 18   Ht 4' 11" (1.499 m)   Wt 56.7 kg (125 lb)   BMI 25.25 kg/m²      Physical Examination:  General: Patient resting comfortably, in no acute distress   Eye: PERRLA, EOMI, clear conjunctiva, eyelids normal  HENT: Head-normocephalic and atraumatic  Neck: full range of motion, no thyromegaly or lymphadenopathy, trachea midline, supple, no palpable thyroid nodules  Respiratory: clear to auscultation bilaterally without wheezes, rales, rhonchi  Cardiovascular: tachycardia w/ abnormal rhythm, no murmer's noted. No gallops or rubs no JVD.  Capillary refill within normal limits.  Gastrointestinal: soft, non-tender, non-distended with normal bowel sounds, without masses to palpation  Genitourinary: no CVA tenderness to palpation  Musculoskeletal: full range of motion of all extremities/spine without limitation or discomfort  Integumentary: no rashes or skin lesions present  Neurologic: no signs of peripheral neurological deficit, motor/sensory function intact  Psychiatric:  alert and oriented, cognitive function intact, cooperative with exam, good eye contact, judgement and insight intact, mood and affect full range.     Labs:  CMP:   Lab Results   Component Value Date    GLUCOSE 101 (H) 05/02/2023    CALCIUM 9.6 05/02/2023    ALBUMIN 3.9 05/02/2023     05/02/2023    K 3.6 05/02/2023    CO2 25 05/02/2023    BUN 20.2 05/02/2023    CREATININE 1.04 05/02/2023    ALKPHOS 109 05/02/2023    ALT 19 05/02/2023    AST 21 05/02/2023    BILITOT 0.4 05/02/2023      CBC:   Lab Results   Component Value Date    WBC 7.44 05/02/2023    HGB 13.6 (L) 05/02/2023    HCT 39.7 (L) 05/02/2023    MCV 77.1 (L) 05/02/2023    RDW 16.8 05/02/2023     Coagulation:   Lab Results   Component Value Date    INR " 4.13 (H) 05/02/2023     DM:   Lab Results   Component Value Date    HGBA1C 5.2 05/02/2023    .5 05/02/2023    CREATININE 1.04 05/02/2023     Thyroid:   Lab Results   Component Value Date    TSH 1.463 05/02/2023     LFTs:   Lab Results   Component Value Date    LABPROT 7.5 05/02/2023    ALBUMIN 3.9 05/02/2023    AST 21 05/02/2023    ALT 19 05/02/2023    ALKPHOS 109 05/02/2023         ASSESSMENT & PLAN:     New onset HFrEF w/ EF 36%   New onset atrial fibrillation currently rate controlled   Endocarditis s/p MVR   Hypertension   -echo performed 5/2/2023 showing EF 36% with grade 1 diastolic dysfunction   -EKG performed in clinic showing atrial fibrillation with rate of 105   -patient prescribed lopressor prior to discharge from hospital earlier this month, changing to toprol-XL today per GDMT.    -Starting Entresto secondary to new onset CHF  -still awaiting Holter monitor prescribed at discharge   -Pt has f/u with cardiology 6/6/2023  -continue coumadin for anticoagulation, following in [facility] coumadin clinic (last seen 4/23/2023)   -can continue amlodipine 5mg   -counseled to monitor blood pressure at home and should discontinue amlodipine if blood pressure is in hypotensive range   -given strict ED precautions to return if symptoms worsen or he has any new episodes of severe palpitations, chest pain, sob, or LE swelling.     Return to clinic in 1 month to establish care with myself.    Cheyenne Jean MD  [facility] Internal Medicine, HO-1

## 2023-06-05 ENCOUNTER — OFFICE VISIT (OUTPATIENT)
Dept: INTERNAL MEDICINE | Facility: CLINIC | Age: 53
End: 2023-06-05
Payer: MEDICAID

## 2023-06-05 VITALS
HEIGHT: 60 IN | TEMPERATURE: 99 F | RESPIRATION RATE: 18 BRPM | SYSTOLIC BLOOD PRESSURE: 116 MMHG | OXYGEN SATURATION: 100 % | DIASTOLIC BLOOD PRESSURE: 75 MMHG | WEIGHT: 126.38 LBS | BODY MASS INDEX: 24.81 KG/M2 | HEART RATE: 80 BPM

## 2023-06-05 DIAGNOSIS — I47.9 PAROXYSMAL TACHYCARDIA: ICD-10-CM

## 2023-06-05 DIAGNOSIS — M25.562 ACUTE PAIN OF LEFT KNEE: ICD-10-CM

## 2023-06-05 DIAGNOSIS — Z95.2 S/P MVR (MITRAL VALVE REPLACEMENT): ICD-10-CM

## 2023-06-05 DIAGNOSIS — I50.40 COMBINED SYSTOLIC AND DIASTOLIC HEART FAILURE, UNSPECIFIED HF CHRONICITY: ICD-10-CM

## 2023-06-05 PROCEDURE — 99214 OFFICE O/P EST MOD 30 MIN: CPT | Mod: PBBFAC

## 2023-06-05 RX ORDER — METOPROLOL SUCCINATE 25 MG/1
25 TABLET, EXTENDED RELEASE ORAL DAILY
Qty: 30 TABLET | Refills: 11 | Status: SHIPPED | OUTPATIENT
Start: 2023-06-05 | End: 2023-06-15

## 2023-06-05 NOTE — PROGRESS NOTES
[facility] INTERNAL MEDICINE  Post-wards visit     SUBJECTIVE:      Chief Complaint: Follow-up     HPI: Flako Power is a 52 y.o. yo male w/ PMH of HTN and endocarditis s/p MVR 2/8/2023 who presents for post-wards follow-up.  Patient doing well today, states he occasionally experiences chest tightness with lifting heavy objects (works as a ) but overall has been doing well since hospitalization.  He denies any recent episodes of chest pain, sob, palpitations, orthopnea, LE swelling, PND, or diaphoresis.  He is scheduled to follow-up with cardiology for Holter monitor tomorrow and has clinic visit scheduled in August.  He endorses good compliance with medication regimen and denies any alcohol, tobacco, or recreational drug use.      Past Medical History:   has a past medical history of Cough, Hypertension, Infective endocarditis, Mitral incompetence, and PICC (peripherally inserted central catheter) in place.     Past Surgical History:   has a past surgical history that includes Left heart catheterization (N/A, 01/19/2023); Abdominal surgery; Tonsillectomy; and Mitral valve replacement (N/A, 2/8/2023).     Family History:  family history includes Breast cancer in his mother; Diabetes type I in his mother; Hypertension in his mother; No Known Problems in his father.     Social History:   reports that he has never smoked. He has never used smokeless tobacco. He reports that he does not currently use alcohol. He reports that he does not use drugs.     Allergies:  has No Known Allergies.     Home Medications:  Prior to Admission medications    Medication Sig Start Date End Date Taking? Authorizing Provider   amLODIPine (NORVASC) 5 MG tablet Take 1 tablet (5 mg total) by mouth once daily. 1/26/23 1/26/24 Yes Alexia Alvarez,    warfarin (COUMADIN) 4 MG tablet Take 2 tabs (8 mg) oral at bedtime 4/17/23  Yes Magali Barboza MD   metoprolol tartrate (LOPRESSOR) 25 MG tablet Take 0.5 tablets (12.5 mg total) by  "mouth 2 (two) times daily. 5/2/23 5/8/23 Yes Tung Cosby,    baclofen (LIORESAL) 10 MG tablet Take 1 tablet (10 mg total) by mouth 3 (three) times daily as needed (muscle spasms). 12/20/22 12/27/22  Garfield Echeverria Jr., FNP   diclofenac (VOLTAREN) 75 MG EC tablet Take 1 tablet (75 mg total) by mouth 2 (two) times daily.  Patient not taking: Reported on 5/8/2023 12/16/22   Mandeep Qureshi MD   metoprolol succinate (TOPROL-XL) 25 MG 24 hr tablet Take 1 tablet (25 mg total) by mouth once daily. 5/8/23 5/7/24  Cheyenne Jean MD   mupirocin (BACTROBAN) 2 % ointment by Nasal route once daily. Apply to inside of both nostrils the night before surgery and the morning of surgery.  Patient not taking: Reported on 5/8/2023 1/30/23   Jose Ramon Rodrigues MD   sacubitriL-valsartan (ENTRESTO) 24-26 mg per tablet Take 1 tablet by mouth 2 (two) times daily. 5/8/23   Cheyenne Jean MD     Review of Systems   Constitutional:  Negative for chills and fever.   Eyes:  Negative for blurred vision and double vision.   Respiratory:  Negative for cough and hemoptysis.    Cardiovascular:  Negative for chest pain, palpitations, orthopnea, claudication and leg swelling.        Endorses chest tightness associated with lifting heavy objects    Gastrointestinal:  Negative for abdominal pain, blood in stool, constipation, diarrhea, heartburn, melena, nausea and vomiting.   Genitourinary:  Negative for dysuria.   Musculoskeletal:  Negative for myalgias and neck pain.   Neurological:  Negative for dizziness and headaches.       OBJECTIVE:     Vital signs:   /75 (BP Location: Right arm, Patient Position: Sitting, BP Method: Medium (Automatic))   Pulse 80   Temp 98.8 °F (37.1 °C) (Oral)   Resp 18   Ht 4' 11" (1.499 m)   Wt 57.3 kg (126 lb 6.4 oz)   SpO2 100%   BMI 25.53 kg/m²      Physical Examination:  General: Patient resting comfortably, in no acute distress   Eye: PERRLA, EOMI, clear conjunctiva, eyelids normal  HENT: Head-normocephalic " and atraumatic  Neck: full range of motion, no thyromegaly or lymphadenopathy, trachea midline, supple, no palpable thyroid nodules  Respiratory: clear to auscultation bilaterally without wheezes, rales, rhonchi  Cardiovascular: regular rate and rhythm, no murmer's noted. No gallops or rubs no JVD.  Capillary refill within normal limits.  Gastrointestinal: soft, non-tender, non-distended with normal bowel sounds, without masses to palpation  Genitourinary: no CVA tenderness to palpation  Musculoskeletal: full range of motion of all extremities/spine without limitation or discomfort  Integumentary: no rashes or skin lesions present  Neurologic: no signs of peripheral neurological deficit, motor/sensory function intact  Psychiatric:  alert and oriented, cognitive function intact, cooperative with exam, good eye contact, judgement and insight intact, mood and affect full range.     Labs:  CMP:   Lab Results   Component Value Date    GLUCOSE 101 (H) 05/02/2023    CALCIUM 9.6 05/02/2023    ALBUMIN 3.9 05/02/2023     05/02/2023    K 3.6 05/02/2023    CO2 25 05/02/2023    BUN 20.2 05/02/2023    CREATININE 1.04 05/02/2023    ALKPHOS 109 05/02/2023    ALT 19 05/02/2023    AST 21 05/02/2023    BILITOT 0.4 05/02/2023      CBC:   Lab Results   Component Value Date    WBC 7.44 05/02/2023    HGB 13.6 (L) 05/02/2023    HCT 39.7 (L) 05/02/2023    MCV 77.1 (L) 05/02/2023    RDW 16.8 05/02/2023     Coagulation:   Lab Results   Component Value Date    INR 4.13 (H) 05/02/2023     DM:   Lab Results   Component Value Date    HGBA1C 5.2 05/02/2023    .5 05/02/2023    CREATININE 1.04 05/02/2023     Thyroid:   Lab Results   Component Value Date    TSH 1.463 05/02/2023     LFTs:   Lab Results   Component Value Date    LABPROT 7.5 05/02/2023    ALBUMIN 3.9 05/02/2023    AST 21 05/02/2023    ALT 19 05/02/2023    ALKPHOS 109 05/02/2023         ASSESSMENT & PLAN:     HFrEF w/ EF 36%   Atrial fibrillation, rate controlled   Hx of  Endocarditis s/p MVR - treated x6 weeks w/ IV antibiotics in January 2023   Hypertension   -echo performed 5/2/2023 showing EF 36% with grade 1 diastolic dysfunction   -LHC performed 1/19/2023 showing no obstructive CAD   -continue Toprol-XL and Entresto for GDMT    -still awaiting Holter monitor prescribed at discharge - has appt with cardiology tomorrow for fitting and has clinic appt scheduled in August for f/u   -continue coumadin for anticoagulation, previously following in [facility] coumadin clinic (last seen 4/23/2023).  Previous INR 4.3, will recheck today and refer patient to [facility] for continued monitoring.   -can continue amlodipine 5mg, counseled to monitor blood pressure at home and should discontinue amlodipine if blood pressure is in hypotensive range -- appears well controlled today at 116/75  -given strict ED precautions to return if symptoms worsen or he has any new episodes of severe palpitations, chest pain, sob, or LE swelling.     Labs ordered: INR, CMP, Lipid panel   Discussed colonoscopy and TDAP vaccine today and patient states he would like to think about it.  Will discuss health maintenance at next visit.   Return to clinic in 3 months     Cheyenne Jean MD  [facility] Internal Medicine, HO-1

## 2023-06-06 ENCOUNTER — HOSPITAL ENCOUNTER (OUTPATIENT)
Dept: CARDIOLOGY | Facility: HOSPITAL | Age: 53
Discharge: HOME | End: 2023-06-06
Payer: MEDICAID

## 2023-06-06 DIAGNOSIS — I47.9 PAROXYSMAL TACHYCARDIA: ICD-10-CM

## 2023-06-06 PROCEDURE — 93225 XTRNL ECG REC<48 HRS REC: CPT

## 2023-06-08 LAB
OHS CV EVENT MONITOR DAY: 0
OHS CV HOLTER LENGTH DECIMAL HOURS: 48
OHS CV HOLTER LENGTH HOURS: 48
OHS CV HOLTER LENGTH MINUTES: 0

## 2023-06-08 NOTE — PROGRESS NOTES
Attending Addendum:   Patient seen and examined in clinic. Management and Plan were discussed with resident. Care was reasonable and necessary.   Judit Rodriguez MD  [facility]

## 2023-06-15 ENCOUNTER — OFFICE VISIT (OUTPATIENT)
Dept: CARDIOLOGY | Facility: CLINIC | Age: 53
End: 2023-06-15
Payer: MEDICAID

## 2023-06-15 VITALS
TEMPERATURE: 98 F | HEART RATE: 82 BPM | BODY MASS INDEX: 24.2 KG/M2 | OXYGEN SATURATION: 100 % | RESPIRATION RATE: 20 BRPM | DIASTOLIC BLOOD PRESSURE: 74 MMHG | SYSTOLIC BLOOD PRESSURE: 117 MMHG | WEIGHT: 123.25 LBS | HEIGHT: 60 IN

## 2023-06-15 DIAGNOSIS — Z95.2 S/P MVR (MITRAL VALVE REPLACEMENT): ICD-10-CM

## 2023-06-15 DIAGNOSIS — I50.42 CHRONIC COMBINED SYSTOLIC AND DIASTOLIC HEART FAILURE: Primary | ICD-10-CM

## 2023-06-15 DIAGNOSIS — I47.9 PAROXYSMAL TACHYCARDIA: ICD-10-CM

## 2023-06-15 DIAGNOSIS — I50.40 COMBINED SYSTOLIC AND DIASTOLIC HEART FAILURE, UNSPECIFIED HF CHRONICITY: ICD-10-CM

## 2023-06-15 DIAGNOSIS — R00.0 TACHYCARDIA: ICD-10-CM

## 2023-06-15 PROCEDURE — 99214 OFFICE O/P EST MOD 30 MIN: CPT | Mod: PBBFAC | Performed by: INTERNAL MEDICINE

## 2023-06-15 RX ORDER — SACUBITRIL AND VALSARTAN 49; 51 MG/1; MG/1
1 TABLET, FILM COATED ORAL 2 TIMES DAILY
Qty: 60 TABLET | Refills: 2 | Status: SHIPPED | OUTPATIENT
Start: 2023-06-15 | End: 2023-06-19 | Stop reason: SDUPTHER

## 2023-06-15 RX ORDER — METOPROLOL SUCCINATE 25 MG/1
25 TABLET, EXTENDED RELEASE ORAL 2 TIMES DAILY
Qty: 60 TABLET | Refills: 11 | Status: SHIPPED | OUTPATIENT
Start: 2023-06-15 | End: 2024-06-14

## 2023-06-15 NOTE — PROGRESS NOTES
[facility] Internal Medicine Clinic Visit    Chief Complaint:      initial visit denies chest pain since angio has SAN no ques     Subjective:     HPI:  Flako Power is a 52 y.o. male who has a past medical history of chronic diastolic and systolic heart failure, paroxysmal tachycardia, Hypertension, Infective endocarditis of the mitral valve with severe mitral valve regurgitation s/p mechanical mitral valve repair.  Generally seen here early last month for palpitations.  Symptoms improved after given his home metoprolol, his medications were refilled and he was sent home to follow-up in the cardiology clinic.  Patient states he is generally been doing well since being discharged from the hospital.     He works as a .  In January before he had infective endocarditis in the mitral valve repair he was able to work 5 days a week without any issues.  Since he returned to work s/p MV repair he started working 3 days a week which is all he could tolerate with his breathing.  States he usually tries to take hour long walks but with the heat of the summer he is only been able to do 30 minutes at a time.    He denies any smoking.  States he drank alcohol last 3 years ago.  Denies any illicit substance use.    Past Medical History:   Diagnosis Date    Cough     Hypertension     Infective endocarditis     Mitral incompetence     PICC (peripherally inserted central catheter) in place        Past Surgical History:   Procedure Laterality Date    ABDOMINAL SURGERY      LEFT HEART CATHETERIZATION N/A 01/19/2023    Procedure: Left heart cath;  Surgeon: Speedy Rendon Jr., MD;  Location: [facility] CATH LAB;  Service: Cardiology;  Laterality: N/A;  Diagnostic Procedure Only- Severe MR Awaiting Surgery.    MITRAL VALVE REPLACEMENT N/A 2/8/2023    Procedure: REPLACEMENT, MITRAL VALVE;  Surgeon: Jose Ramon Rodrigues MD;  Location: [facility] OR;  Service: Cardiology;  Laterality: N/A;  ECHO NOTIFIED    TONSILLECTOMY         Social History  "    Socioeconomic History    Marital status: Single   Tobacco Use    Smoking status: Never    Smokeless tobacco: Never   Substance and Sexual Activity    Alcohol use: Not Currently    Drug use: Never    Sexual activity: Not Currently   Social History Narrative    ** Merged History Encounter **              Current Outpatient Medications   Medication Instructions    amLODIPine (NORVASC) 5 mg, Oral, Daily    baclofen (LIORESAL) 10 mg, Oral, 3 times daily PRN    diclofenac (VOLTAREN) 75 mg, Oral, 2 times daily    metoprolol succinate (TOPROL-XL) 25 mg, Oral, Daily    mupirocin (BACTROBAN) 2 % ointment Nasal, Daily, Apply to inside of both nostrils the night before surgery and the morning of surgery.    sacubitriL-valsartan (ENTRESTO) 24-26 mg per tablet 1 tablet, Oral, 2 times daily    warfarin (COUMADIN) 4 MG tablet Take 2 tabs (8 mg) oral at bedtime       Review of Systems  ROS  Negative except for noted above.    Objective:   Last 24 Hour Vital Signs:  Vitals  BP: 117/74  Temp: 98.1 °F (36.7 °C)  Temp Source: Oral  Pulse: 82  Resp: 20  SpO2: 100 %  Height: 4' 11" (149.9 cm)  Weight: 55.9 kg (123 lb 3.8 oz)    Physical Examination:  Vital signs and nursing notes reviewed.  Constitutional: Patient is in NAD. Awake and alert.    Head: Atraumatic. Normocephalic.  Eyes: Conjunctivae nl. No scleral icterus.  Neck: Supple. Full ROM.  No JVD  Cardiovascular: Regular rate and rhythm.  Mechanical click to S1 noted.  No murmurs, rubs, or gallops. Distal pulses are 2+ and symmetric.  Pulmonary/Chest: No respiratory distress. Clear to auscultation bilaterally. No wheezing, rales, or rhonchi.  Abdominal: Soft. Non-distended. No TTP. No rebound, guarding, or rigidity.   Musculoskeletal: Moves all extremities. No edema.    Skin: Warm and dry.  Neurological: Awake and alert. No acute focal neurological deficits are appreciated.      Prior heart catheterization February 2023 showed no obstructive CAD.     Echo results from " 05/02/2023:  The left ventricle is normal in size with eccentric hypertrophy and moderately decreased systolic function.  The estimated ejection fraction is 36%.  Grade I left ventricular diastolic dysfunction.  Mild right ventricular enlargement with mildly reduced right ventricular systolic function.  Mild right atrial enlargement.  Mild pulmonic regurgitation.  There is a mechanical mitral valve prosthesis.  Mild tricuspid regurgitation.  Normal central venous pressure (3 mmHg).  The estimated PA systolic pressure is 25 mmHg.  There is no pulmonary hypertension.  Mild left atrial enlargement.        Holter monitor results from 06/06:  Underlying rhythm:   Sinus Arrythmia:  Frequent PACs and PVCs.  7 short runs of VT lasting 3-5 beats and at -240. 4 runs of SVT with -220.    Pauses:  Longest pause is 2.2 seconds long.  Pauses >3 seconds are considered significant.  Symptoms:  None reported    Events: During patient marked event the patient is in sinus rhythm with HR 85.    Assessment & Plan:       History of infectious endocarditis, , severe mitral valve regurgitation s/p Mechanical mitral valve repair (02/08/2023)  CHF - combined systolic diastolic dysfunction  Hypertension    - currently euvolemic.  -  continue Coumadin   - we are increasing his Toprol-XL to twice a day for better control of PACs/PVC or arrhythmias.  Increasing the Entresto to the 49-51 b.i.d.  - discontinue amlodipine.  - blood pressure well controlled today at 117/74.   - BMP in 2 weeks    Follow-ups  -Follow-up cardiology clinic in 4 months      Adrian Rodriguez DO  [facility] Internal Medicine, HO[facility]

## 2023-06-15 NOTE — PROGRESS NOTES
Cardiology Attending    I evaluated Flako Power and discussed the patient's symptoms, findings, and management plan with the resident.  Please see the Cardiology note for details.

## 2023-06-19 DIAGNOSIS — Z95.2 S/P MVR (MITRAL VALVE REPLACEMENT): ICD-10-CM

## 2023-06-19 DIAGNOSIS — I50.40 COMBINED SYSTOLIC AND DIASTOLIC HEART FAILURE, UNSPECIFIED HF CHRONICITY: ICD-10-CM

## 2023-06-19 DIAGNOSIS — Z95.2 S/P MVR (MITRAL VALVE REPLACEMENT): Primary | ICD-10-CM

## 2023-06-19 DIAGNOSIS — I47.9 PAROXYSMAL TACHYCARDIA: ICD-10-CM

## 2023-06-19 RX ORDER — WARFARIN 4 MG/1
TABLET ORAL
Qty: 60 TABLET | Refills: 1 | Status: SHIPPED | OUTPATIENT
Start: 2023-06-19

## 2023-06-19 RX ORDER — SACUBITRIL AND VALSARTAN 49; 51 MG/1; MG/1
1 TABLET, FILM COATED ORAL 2 TIMES DAILY
Qty: 60 TABLET | Refills: 2 | Status: SHIPPED | OUTPATIENT
Start: 2023-06-19 | End: 2023-06-20 | Stop reason: SDUPTHER

## 2023-06-20 DIAGNOSIS — Z95.2 S/P MVR (MITRAL VALVE REPLACEMENT): ICD-10-CM

## 2023-06-20 DIAGNOSIS — I47.9 PAROXYSMAL TACHYCARDIA: ICD-10-CM

## 2023-06-20 DIAGNOSIS — I50.40 COMBINED SYSTOLIC AND DIASTOLIC HEART FAILURE, UNSPECIFIED HF CHRONICITY: ICD-10-CM

## 2023-06-20 RX ORDER — SACUBITRIL AND VALSARTAN 49; 51 MG/1; MG/1
1 TABLET, FILM COATED ORAL 2 TIMES DAILY
Qty: 60 TABLET | Refills: 2 | Status: SHIPPED | OUTPATIENT
Start: 2023-06-20 | End: 2023-10-19 | Stop reason: SDUPTHER

## 2023-06-21 ENCOUNTER — PATIENT MESSAGE (OUTPATIENT)
Dept: ADMINISTRATIVE | Facility: HOSPITAL | Age: 53
End: 2023-06-21
Payer: MEDICAID

## 2023-06-26 ENCOUNTER — ANTI-COAG VISIT (OUTPATIENT)
Dept: CARDIOLOGY | Facility: HOSPITAL | Age: 53
End: 2023-06-26
Payer: MEDICAID

## 2023-06-26 DIAGNOSIS — R00.0 TACHYCARDIA: ICD-10-CM

## 2023-06-26 DIAGNOSIS — Z79.01 ANTICOAGULATION MONITORING, INR RANGE 2.5-3.5: ICD-10-CM

## 2023-06-26 DIAGNOSIS — I47.9 PAROXYSMAL TACHYCARDIA: ICD-10-CM

## 2023-06-26 DIAGNOSIS — Z95.2 S/P MVR (MITRAL VALVE REPLACEMENT): Primary | ICD-10-CM

## 2023-06-26 LAB
CTP QC/QA: YES
INR PPP: 3.4 (ref 2–3)
PROTHROMBIN TIME, POC: 40.3 (ref 2–3)

## 2023-06-26 PROCEDURE — 85610 PROTHROMBIN TIME: CPT

## 2023-06-26 PROCEDURE — 99211 OFF/OP EST MAY X REQ PHY/QHP: CPT

## 2023-06-26 NOTE — PROGRESS NOTES
POC pt/inr performed.  No change to warfarin dosing. Pt transferring in from [facility].

## 2023-07-18 ENCOUNTER — ANTI-COAG VISIT (OUTPATIENT)
Dept: CARDIOLOGY | Facility: HOSPITAL | Age: 53
End: 2023-07-18
Payer: MEDICAID

## 2023-07-18 DIAGNOSIS — Z95.2 HISTORY OF MITRAL VALVE REPLACEMENT WITH MECHANICAL VALVE: Primary | ICD-10-CM

## 2023-07-18 DIAGNOSIS — Z79.01 ANTICOAGULATION MONITORING, INR RANGE 2.5-3.5: ICD-10-CM

## 2023-07-18 DIAGNOSIS — Z95.2 S/P MVR (MITRAL VALVE REPLACEMENT): ICD-10-CM

## 2023-07-18 LAB
INR BLD: 4.07 (ref 0–1.3)
PROTHROMBIN TIME: 38.9 SECONDS (ref 12.5–14.5)

## 2023-07-18 PROCEDURE — 36415 COLL VENOUS BLD VENIPUNCTURE: CPT

## 2023-07-18 PROCEDURE — 85610 PROTHROMBIN TIME: CPT

## 2023-07-18 NOTE — PROGRESS NOTES
POC pt/inr performed.  Noted pt/inr 60.1/5.0.  Lab drawn per protocol.  Noted pt/inr 38.9/4.07 Pt will hold warfarin today, then take 4 mg on 7/19/23, and resume dosing on 7/20/23.

## 2023-08-01 ENCOUNTER — ANTI-COAG VISIT (OUTPATIENT)
Dept: CARDIOLOGY | Facility: HOSPITAL | Age: 53
End: 2023-08-01
Payer: MEDICAID

## 2023-08-01 DIAGNOSIS — Z95.2 HISTORY OF MITRAL VALVE REPLACEMENT WITH MECHANICAL VALVE: Primary | ICD-10-CM

## 2023-08-01 DIAGNOSIS — Z95.2 S/P MVR (MITRAL VALVE REPLACEMENT): ICD-10-CM

## 2023-08-01 DIAGNOSIS — Z79.01 ANTICOAGULATION MONITORING, INR RANGE 2.5-3.5: ICD-10-CM

## 2023-08-01 LAB
CTP QC/QA: YES
INR PPP: 3.9 (ref 2–3)
PROTHROMBIN TIME, POC: 47.2 (ref 2–3)

## 2023-08-01 PROCEDURE — 85610 PROTHROMBIN TIME: CPT

## 2023-08-01 PROCEDURE — 99211 OFF/OP EST MAY X REQ PHY/QHP: CPT

## 2023-08-15 ENCOUNTER — ANTI-COAG VISIT (OUTPATIENT)
Dept: CARDIOLOGY | Facility: HOSPITAL | Age: 53
End: 2023-08-15
Payer: MEDICAID

## 2023-08-15 DIAGNOSIS — Z79.01 ANTICOAGULATION MONITORING, INR RANGE 2.5-3.5: ICD-10-CM

## 2023-08-15 DIAGNOSIS — Z95.2 S/P MVR (MITRAL VALVE REPLACEMENT): ICD-10-CM

## 2023-08-15 DIAGNOSIS — Z95.2 HISTORY OF MITRAL VALVE REPLACEMENT WITH MECHANICAL VALVE: Primary | ICD-10-CM

## 2023-08-15 LAB
CTP QC/QA: YES
INR PPP: 3 (ref 2–3)
PROTHROMBIN TIME, POC: 36 (ref 2–3)

## 2023-08-15 PROCEDURE — 85610 PROTHROMBIN TIME: CPT

## 2023-08-15 NOTE — PROGRESS NOTES
POC pt/inr performed.  No change to warfarin dosing. Prescription called into [facility] on [facility] for Warfarin 4 mg.

## 2023-08-16 LAB
INR PPP: 4.1
PROTHROMBIN TIME: 38.7 SECONDS (ref 11.4–14)

## 2023-08-31 ENCOUNTER — ANTI-COAG VISIT (OUTPATIENT)
Dept: CARDIOLOGY | Facility: HOSPITAL | Age: 53
End: 2023-08-31
Payer: MEDICAID

## 2023-08-31 DIAGNOSIS — Z79.01 ANTICOAGULATION MONITORING, INR RANGE 2.5-3.5: ICD-10-CM

## 2023-08-31 DIAGNOSIS — Z95.2 S/P MVR (MITRAL VALVE REPLACEMENT): ICD-10-CM

## 2023-08-31 DIAGNOSIS — Z95.2 HISTORY OF MITRAL VALVE REPLACEMENT WITH MECHANICAL VALVE: Primary | ICD-10-CM

## 2023-08-31 LAB
CTP QC/QA: YES
INR PPP: 4.1 (ref 2–3)
PROTHROMBIN TIME, POC: 48.9 (ref 2–3)

## 2023-08-31 PROCEDURE — 85610 PROTHROMBIN TIME: CPT

## 2023-08-31 NOTE — PROGRESS NOTES
POC pt/inr performed.  Pt will take 4 mg of warfarin today and eat a portion of greens, then resume with decreased dosing.

## 2023-09-11 ENCOUNTER — OFFICE VISIT (OUTPATIENT)
Dept: CARDIAC SURGERY | Facility: CLINIC | Age: 53
End: 2023-09-11
Payer: MEDICAID

## 2023-09-11 VITALS
DIASTOLIC BLOOD PRESSURE: 81 MMHG | HEART RATE: 55 BPM | OXYGEN SATURATION: 98 % | WEIGHT: 133 LBS | HEIGHT: 60 IN | BODY MASS INDEX: 26.11 KG/M2 | SYSTOLIC BLOOD PRESSURE: 138 MMHG

## 2023-09-11 DIAGNOSIS — Z95.2 S/P MVR (MITRAL VALVE REPLACEMENT): ICD-10-CM

## 2023-09-11 PROCEDURE — 4010F PR ACE/ARB THEARPY RXD/TAKEN: ICD-10-PCS | Mod: CPTII,,, | Performed by: THORACIC SURGERY (CARDIOTHORACIC VASCULAR SURGERY)

## 2023-09-11 PROCEDURE — 3075F SYST BP GE 130 - 139MM HG: CPT | Mod: CPTII,,, | Performed by: THORACIC SURGERY (CARDIOTHORACIC VASCULAR SURGERY)

## 2023-09-11 PROCEDURE — 99213 PR OFFICE/OUTPT VISIT, EST, LEVL III, 20-29 MIN: ICD-10-PCS | Mod: ,,, | Performed by: THORACIC SURGERY (CARDIOTHORACIC VASCULAR SURGERY)

## 2023-09-11 PROCEDURE — 99213 OFFICE O/P EST LOW 20 MIN: CPT | Mod: ,,, | Performed by: THORACIC SURGERY (CARDIOTHORACIC VASCULAR SURGERY)

## 2023-09-11 PROCEDURE — 1160F PR REVIEW ALL MEDS BY PRESCRIBER/CLIN PHARMACIST DOCUMENTED: ICD-10-PCS | Mod: CPTII,,, | Performed by: THORACIC SURGERY (CARDIOTHORACIC VASCULAR SURGERY)

## 2023-09-11 PROCEDURE — 3079F DIAST BP 80-89 MM HG: CPT | Mod: CPTII,,, | Performed by: THORACIC SURGERY (CARDIOTHORACIC VASCULAR SURGERY)

## 2023-09-11 PROCEDURE — 3075F PR MOST RECENT SYSTOLIC BLOOD PRESS GE 130-139MM HG: ICD-10-PCS | Mod: CPTII,,, | Performed by: THORACIC SURGERY (CARDIOTHORACIC VASCULAR SURGERY)

## 2023-09-11 PROCEDURE — 1159F MED LIST DOCD IN RCRD: CPT | Mod: CPTII,,, | Performed by: THORACIC SURGERY (CARDIOTHORACIC VASCULAR SURGERY)

## 2023-09-11 PROCEDURE — 3079F PR MOST RECENT DIASTOLIC BLOOD PRESSURE 80-89 MM HG: ICD-10-PCS | Mod: CPTII,,, | Performed by: THORACIC SURGERY (CARDIOTHORACIC VASCULAR SURGERY)

## 2023-09-11 PROCEDURE — 1160F RVW MEDS BY RX/DR IN RCRD: CPT | Mod: CPTII,,, | Performed by: THORACIC SURGERY (CARDIOTHORACIC VASCULAR SURGERY)

## 2023-09-11 PROCEDURE — 1159F PR MEDICATION LIST DOCUMENTED IN MEDICAL RECORD: ICD-10-PCS | Mod: CPTII,,, | Performed by: THORACIC SURGERY (CARDIOTHORACIC VASCULAR SURGERY)

## 2023-09-11 PROCEDURE — 3008F PR BODY MASS INDEX (BMI) DOCUMENTED: ICD-10-PCS | Mod: CPTII,,, | Performed by: THORACIC SURGERY (CARDIOTHORACIC VASCULAR SURGERY)

## 2023-09-11 PROCEDURE — 3044F PR MOST RECENT HEMOGLOBIN A1C LEVEL <7.0%: ICD-10-PCS | Mod: CPTII,,, | Performed by: THORACIC SURGERY (CARDIOTHORACIC VASCULAR SURGERY)

## 2023-09-11 PROCEDURE — 4010F ACE/ARB THERAPY RXD/TAKEN: CPT | Mod: CPTII,,, | Performed by: THORACIC SURGERY (CARDIOTHORACIC VASCULAR SURGERY)

## 2023-09-11 PROCEDURE — 3008F BODY MASS INDEX DOCD: CPT | Mod: CPTII,,, | Performed by: THORACIC SURGERY (CARDIOTHORACIC VASCULAR SURGERY)

## 2023-09-11 PROCEDURE — 3044F HG A1C LEVEL LT 7.0%: CPT | Mod: CPTII,,, | Performed by: THORACIC SURGERY (CARDIOTHORACIC VASCULAR SURGERY)

## 2023-09-11 NOTE — PROGRESS NOTES
Patient is status post MVR (M)  doing well without complaints   Vital signs stable/afebrile   Regular rate and rhythm without murmurs rubs or gallops  Coarse breath sounds bilaterally   Wounds CDI   Echocardiogram reviewed   A/P:  Doing well without complaints.  Plan per Cardiology       37.3

## 2023-09-11 NOTE — LETTER
September 11, 2023      [facility] [address], [address]  [place] [address]  Phone: [phone]       Patient: Flako Power   YOB: 1970  Date of Visit: 09/11/2023    To Whom It May Concern:    Narda Power daughter,Joaquina Power, accompanied the patient at [facility] on 09/11/2023. If you have any questions or concerns, or if I can be of further assistance, please do not hesitate to contact me.    Sincerely,    Michelle Gallego

## 2023-09-19 ENCOUNTER — ANTI-COAG VISIT (OUTPATIENT)
Dept: CARDIOLOGY | Facility: HOSPITAL | Age: 53
End: 2023-09-19
Payer: MEDICAID

## 2023-09-19 DIAGNOSIS — Z79.01 ANTICOAGULATION MONITORING, INR RANGE 2.5-3.5: ICD-10-CM

## 2023-09-19 DIAGNOSIS — Z95.2 HISTORY OF MITRAL VALVE REPLACEMENT WITH MECHANICAL VALVE: Primary | ICD-10-CM

## 2023-09-19 DIAGNOSIS — Z95.2 S/P MVR (MITRAL VALVE REPLACEMENT): ICD-10-CM

## 2023-09-19 LAB
CTP QC/QA: YES
INR PPP: 3.1 (ref 2–3)
PROTHROMBIN TIME, POC: 36.9 (ref 2–3)

## 2023-09-19 PROCEDURE — 99211 OFF/OP EST MAY X REQ PHY/QHP: CPT

## 2023-09-19 PROCEDURE — 85610 PROTHROMBIN TIME: CPT

## 2023-09-25 ENCOUNTER — OFFICE VISIT (OUTPATIENT)
Dept: INTERNAL MEDICINE | Facility: CLINIC | Age: 53
End: 2023-09-25
Payer: MEDICAID

## 2023-09-25 VITALS
BODY MASS INDEX: 26.35 KG/M2 | DIASTOLIC BLOOD PRESSURE: 99 MMHG | WEIGHT: 134.19 LBS | OXYGEN SATURATION: 100 % | HEIGHT: 60 IN | SYSTOLIC BLOOD PRESSURE: 109 MMHG | HEART RATE: 90 BPM | TEMPERATURE: 98 F | RESPIRATION RATE: 18 BRPM

## 2023-09-25 DIAGNOSIS — Z12.11 COLON CANCER SCREENING: Primary | ICD-10-CM

## 2023-09-25 PROCEDURE — 99213 OFFICE O/P EST LOW 20 MIN: CPT | Mod: PBBFAC

## 2023-09-25 NOTE — PROGRESS NOTES
[facility] INTERNAL MEDICINE  Post-wards visit     SUBJECTIVE:      Chief Complaint: Follow-up (Patient states no acute complaints today.)     HPI: Flako Power is a 52 y.o. yo male w/ PMH of HTN and endocarditis s/p MVR 2/8/2023 who presents for post-wards follow-up.  Patient doing well today, states he occasionally experiences chest tightness with lifting heavy objects (works as a ) but overall has been doing well.  He denies any recent episodes of chest pain, sob, palpitations, orthopnea, LE swelling, PND, or diaphoresis. He endorses good compliance with medication regimen and denies any alcohol, tobacco, or recreational drug use.  Following with cardiology and has upcoming visit 10/17.      Past Medical History:   has a past medical history of Cough, Hypertension, Infective endocarditis, Mitral incompetence, and PICC (peripherally inserted central catheter) in place.     Past Surgical History:   has a past surgical history that includes Left heart catheterization (N/A, 01/19/2023); Abdominal surgery; Tonsillectomy; and Mitral valve replacement (N/A, 2/8/2023).     Family History:  family history includes Breast cancer in his mother; Diabetes type I in his mother; Hypertension in his mother; No Known Problems in his father.     Social History:   reports that he has never smoked. He has never used smokeless tobacco. He reports that he does not currently use alcohol. He reports that he does not use drugs.     Allergies:  has No Known Allergies.     Home Medications:  Prior to Admission medications    Medication Sig Start Date End Date Taking? Authorizing Provider   amLODIPine (NORVASC) 5 MG tablet Take 1 tablet (5 mg total) by mouth once daily. 1/26/23 1/26/24 Yes Alexia Alvarez,    warfarin (COUMADIN) 4 MG tablet Take 2 tabs (8 mg) oral at bedtime 4/17/23  Yes Magali Barboza MD   metoprolol tartrate (LOPRESSOR) 25 MG tablet Take 0.5 tablets (12.5 mg total) by mouth 2 (two) times daily. 5/2/23 5/8/23  "Yes Tung Cosby, DO   baclofen (LIORESAL) 10 MG tablet Take 1 tablet (10 mg total) by mouth 3 (three) times daily as needed (muscle spasms). 12/20/22 12/27/22  Garfield Echeverria Jr., FNP   diclofenac (VOLTAREN) 75 MG EC tablet Take 1 tablet (75 mg total) by mouth 2 (two) times daily.  Patient not taking: Reported on 5/8/2023 12/16/22   Mandeep Qureshi MD   metoprolol succinate (TOPROL-XL) 25 MG 24 hr tablet Take 1 tablet (25 mg total) by mouth once daily. 5/8/23 5/7/24  Cheyenne Jean MD   mupirocin (BACTROBAN) 2 % ointment by Nasal route once daily. Apply to inside of both nostrils the night before surgery and the morning of surgery.  Patient not taking: Reported on 5/8/2023 1/30/23   Jose Ramon Rodrigues MD   sacubitriL-valsartan (ENTRESTO) 24-26 mg per tablet Take 1 tablet by mouth 2 (two) times daily. 5/8/23   Cheyenne Jean MD     Review of Systems   Constitutional:  Negative for chills and fever.   Eyes:  Negative for blurred vision and double vision.   Respiratory:  Negative for cough and hemoptysis.    Cardiovascular:  Negative for chest pain, palpitations, orthopnea, claudication and leg swelling.        Endorses chest tightness associated with lifting heavy objects    Gastrointestinal:  Negative for abdominal pain, blood in stool, constipation, diarrhea, heartburn, melena, nausea and vomiting.   Genitourinary:  Negative for dysuria.   Musculoskeletal:  Negative for myalgias and neck pain.   Neurological:  Negative for dizziness and headaches.         OBJECTIVE:     Vital signs:   BP (!) 109/99 (BP Location: Left arm, Patient Position: Sitting, BP Method: Medium (Automatic))   Pulse 90   Temp 98.3 °F (36.8 °C) (Oral)   Resp 18   Ht 4' 11" (1.499 m)   Wt 60.9 kg (134 lb 3.2 oz)   SpO2 100%   BMI 27.11 kg/m²      Physical Examination:  General: Patient resting comfortably, in no acute distress   Eye: PERRLA, EOMI, clear conjunctiva, eyelids normal  HENT: Head-normocephalic and atraumatic  Neck: full range of " motion, no thyromegaly or lymphadenopathy, trachea midline, supple, no palpable thyroid nodules  Respiratory: clear to auscultation bilaterally without wheezes, rales, rhonchi  Cardiovascular: irregular rhythm w/ normal rate, no murmer's noted. No gallops or rubs no JVD.  Capillary refill within normal limits.  Gastrointestinal: soft, non-tender, non-distended with normal bowel sounds, without masses to palpation  Genitourinary: no CVA tenderness to palpation  Musculoskeletal: full range of motion of all extremities/spine without limitation or discomfort  Integumentary: no rashes or skin lesions present  Neurologic: no signs of peripheral neurological deficit, motor/sensory function intact  Psychiatric:  alert and oriented, cognitive function intact, cooperative with exam, good eye contact, judgement and insight intact, mood and affect full range.     Labs:  CMP:   Lab Results   Component Value Date    GLUCOSE 76 06/05/2023    CALCIUM 9.0 06/05/2023    ALBUMIN 4.0 06/05/2023     06/05/2023    K 3.8 06/05/2023    CO2 29 06/05/2023    BUN 12.4 06/05/2023    CREATININE 0.98 06/05/2023    ALKPHOS 94 06/05/2023    ALT 18 06/05/2023    AST 25 06/05/2023    BILITOT 0.5 06/05/2023      CBC:   Lab Results   Component Value Date    WBC 7.44 05/02/2023    HGB 13.6 (L) 05/02/2023    HCT 39.7 (L) 05/02/2023    MCV 77.1 (L) 05/02/2023    RDW 16.8 05/02/2023     Coagulation:   Lab Results   Component Value Date    INR 3.1 (A) 09/19/2023     DM:   Lab Results   Component Value Date    HGBA1C 5.2 05/02/2023    .5 05/02/2023    CREATININE 0.98 06/05/2023     Thyroid:   Lab Results   Component Value Date    TSH 1.463 05/02/2023     LFTs:   Lab Results   Component Value Date    LABPROT 7.8 06/05/2023    ALBUMIN 4.0 06/05/2023    AST 25 06/05/2023    ALT 18 06/05/2023    ALKPHOS 94 06/05/2023         ASSESSMENT & PLAN:     HFrEF w/ EF 36%   Atrial fibrillation, rate controlled   Hx of Endocarditis s/p MVR - treated x6 weeks  w/ IV antibiotics in January 2023   Hypertension   -echo performed 5/2/2023 showing EF 36% with grade 1 diastolic dysfunction   -LHC performed 1/19/2023 showing no obstructive CAD   -continue Toprol-XL and Entresto for GDMT    -continue coumadin for anticoagulation, following with coumadin clinic at [facility]  -given strict ED precautions to return if symptoms worsen or he has any new episodes of severe palpitations, chest pain, sob, or LE swelling.   -continue to follow with cardiology, next appt 10/17    Cologuard ordered today   Patient continuing to decline vaccines   Labs reviewed     Cheyenne Jean MD  [facility] Internal Medicine, HO-[facility]

## 2023-10-03 ENCOUNTER — ANTI-COAG VISIT (OUTPATIENT)
Dept: CARDIOLOGY | Facility: HOSPITAL | Age: 53
End: 2023-10-03
Payer: MEDICAID

## 2023-10-03 DIAGNOSIS — Z95.2 HISTORY OF MITRAL VALVE REPLACEMENT WITH MECHANICAL VALVE: Primary | ICD-10-CM

## 2023-10-03 DIAGNOSIS — Z95.2 S/P MVR (MITRAL VALVE REPLACEMENT): ICD-10-CM

## 2023-10-03 DIAGNOSIS — Z79.01 ANTICOAGULATION MONITORING, INR RANGE 2.5-3.5: ICD-10-CM

## 2023-10-03 LAB
CTP QC/QA: YES
INR PPP: 2.9 (ref 2–3)
PROTHROMBIN TIME, POC: 34.8 (ref 2–3)

## 2023-10-03 PROCEDURE — 99211 OFF/OP EST MAY X REQ PHY/QHP: CPT

## 2023-10-03 PROCEDURE — 85610 PROTHROMBIN TIME: CPT

## 2023-10-04 LAB — NONINV COLON CA DNA+OCC BLD SCRN STL QL: NORMAL

## 2023-10-13 NOTE — PROGRESS NOTES
CHIEF COMPLAINT:   Chief Complaint   Patient presents with    Follow-up     4 mos f/u mitral valve regurgitation denies cardiac targets                                                  HPI:  Flako Power 52 y.o. male who has a past medical history of chronic diastolic and systolic heart failure, paroxysmal tachycardia, Hypertension, Infective endocarditis of the mitral valve with severe mitral valve regurgitation s/p mechanical mitral valve repair.  He was initially seen in clinic for palpitations.  At that time metoprolol was prescribed any stated that his symptoms had improved.  At last office visit patient denied any cardiac complaints.  Of note, patient is a  and has had to decrease his amount of working to 3 days per week following his mitral valve repair earlier this year.  He also endorses walking for 1 hour approximately 3 days per week.    Today the patient states that he is feeling well overall.  He denies any cardiac complaints of chest pain, shortness of breath, palpitations, PND, orthopnea, lightheadedness, dizziness, syncope, or claudication symptoms.  He states that he is able to complete his ADLs without any issues or ischemic symptoms.  As stated above, he has slowed down quite a bit since his mitral valve surgery earlier in the year, however he still remains physically active and still does work as a .  He reports compliance with all medications.  He follows with Coumadin clinic and states that he is tolerating Coumadin therapy well.  He endorses following a mostly heart healthy diet but states that he is working to clean his diet up..  Tobacco or illicit drug use.  He denies any alcohol use.                                                                                                                                                                                                                                                                                                                                                                                                                                                                                        CARDIAC TESTING:  Results for orders placed during the hospital encounter of 05/02/23    Echo    Interpretation Summary  · The left ventricle is normal in size with eccentric hypertrophy and moderately decreased systolic function.  · The estimated ejection fraction is 36%.  · Grade I left ventricular diastolic dysfunction.  · Mild right ventricular enlargement with mildly reduced right ventricular systolic function.  · Mild right atrial enlargement.  · Mild pulmonic regurgitation.  · There is a mechanical mitral valve prosthesis.  · Mild tricuspid regurgitation.  · Normal central venous pressure (3 mmHg).  · The estimated PA systolic pressure is 25 mmHg.  · There is no pulmonary hypertension.  · Mild left atrial enlargement.    No results found for this or any previous visit.     Results for orders placed during the hospital encounter of 01/16/23    Cardiac catheterization    Conclusion    There was no obstructive coronary artery disease.    The procedure log was documented by No documenter listed and verified by Speedy Rendon Jr, MD.    Date: 1/19/2023  Time: 5:19 PM    Procedure:  Left heart catheterization    Preoperative diagnosis:  Infective endocarditis    Postoperative diagnosis:  Infective endocarditis    Access:  Right common femoral artery    Estimated blood loss: 10 cc  Complications: None    Summary:  Consent obtained. Risks and benefits discussed with the patient. The patient was brought to the cath lab in a fasting state. The patient was prepped and draped in usual sterile fashion. A preprocedure time-out was performed.    Ultrasound-guided right common femoral arterial access via modified Seldinger technique using a micropuncture kit. A 6 [place]an sheath was inserted into the right common femoral artery.    A 5 [place]an  JL3.5 catheter was used to cannulate the left main coronary artery; angiography was performed, and multiple fluoroscopic views were obtained.  A 5 Portuguese JR4 catheter was used to cannulate the right coronary artery; angiography was performed, and multiple fluoroscopic views were obtained.    At the end the procedure, all wires and catheters were removed.  Hemostasis achieved with manual compression.    Findings:  There was no obstructive coronary artery disease.    Assessment/Plan:  - No obstructive coronary disease. Mgmt of infective endocarditis as per primary team.    Speedy Rendon MD  Interventional Cardiology/Structural Heart Disease  Cardiovascular [facility]       Patient Active Problem List   Diagnosis    Severe mitral valve regurgitation    Bacteremia    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    Endocarditis    S/P MVR (mitral valve replacement)    Paroxysmal tachycardia     Past Surgical History:   Procedure Laterality Date    ABDOMINAL SURGERY      LEFT HEART CATHETERIZATION N/A 01/19/2023    Procedure: Left heart cath;  Surgeon: Speedy Rendon Jr., MD;  Location: [facility] CATH LAB;  Service: Cardiology;  Laterality: N/A;  Diagnostic Procedure Only- Severe MR Awaiting Surgery.    MITRAL VALVE REPLACEMENT N/A 2/8/2023    Procedure: REPLACEMENT, MITRAL VALVE;  Surgeon: Jose Ramon Rodrigues MD;  Location: [facility] OR;  Service: Cardiology;  Laterality: N/A;  ECHO NOTIFIED    TONSILLECTOMY       Social History     Socioeconomic History    Marital status: Single   Tobacco Use    Smoking status: Never    Smokeless tobacco: Never   Substance and Sexual Activity    Alcohol use: Not Currently    Drug use: Never    Sexual activity: Not Currently   Social History Narrative    ** Merged History Encounter **             Family History   Problem Relation Age of Onset    Diabetes type I Mother     Breast cancer Mother     Hypertension Mother     No Known Problems Father      Review of  patient's allergies indicates:  No Known Allergies      ROS:  Review of Systems   Constitutional: Negative.    HENT: Negative.     Eyes: Negative.    Respiratory: Negative.  Negative for shortness of breath.    Cardiovascular: Negative.  Negative for chest pain, palpitations, orthopnea, claudication, leg swelling and PND.   Gastrointestinal: Negative.    Genitourinary: Negative.    Musculoskeletal: Negative.    Skin: Negative.    Neurological: Negative.  Negative for dizziness and weakness.   Endo/Heme/Allergies: Negative.    Psychiatric/Behavioral: Negative.                                                                                                                                                                                  Negative except as stated in the history of present illness. See HPI for details.    PHYSICAL EXAM:  Visit Vitals  BP (!) (P) 139/92 (BP Location: Left arm, Patient Position: Sitting, BP Method: Medium (Automatic))   Pulse (P) 92   Temp (P) 98.6 °F (37 °C)   Resp (P) 18   Ht (P) 5' (1.524 m)   Wt (P) 62 kg (136 lb 11 oz)   SpO2 (P) 97%   BMI (P) 26.69 kg/m²       Physical Exam  Constitutional:       Appearance: Normal appearance.   HENT:      Head: Normocephalic.   Eyes:      Pupils: Pupils are equal, round, and reactive to light.   Cardiovascular:      Rate and Rhythm: Regular rhythm. Tachycardia present.      Pulses: Normal pulses.      Heart sounds: Murmur heard.   Pulmonary:      Effort: Pulmonary effort is normal. No respiratory distress.   Abdominal:      General: There is no distension.   Musculoskeletal:         General: Normal range of motion.      Right lower leg: No edema.      Left lower leg: No edema.   Skin:     General: Skin is warm and dry.   Neurological:      General: No focal deficit present.      Mental Status: He is alert and oriented to person, place, and time.   Psychiatric:         Mood and Affect: Mood normal.         Behavior: Behavior normal.         Current  Outpatient Medications   Medication Instructions    metoprolol succinate (TOPROL-XL) 25 mg, Oral, 2 times daily    sacubitriL-valsartan (ENTRESTO) 49-51 mg per tablet 1 tablet, Oral, 2 times daily    warfarin (COUMADIN) 4 MG tablet Take 2 tabs (8 mg) oral at bedtime        All medications, laboratory studies, cardiac diagnostic imaging reviewed.     Lab Results   Component Value Date    .00 06/05/2023    TRIG 71 06/05/2023    CREATININE 0.98 06/05/2023    MG 1.80 05/02/2023    K 3.8 06/05/2023        ASSESSMENT/PLAN:  History of infectious endocarditis, severe mitral valve regurgitation s/p Mechanical mitral valve repair (02/08/2023)  CHF - combined systolic diastolic dysfunction  Hypertension    - Denies any cardiac complaints today (see HPI)  - Currently euvolemic.  - Continue Coumadin.  Report to Coumadin clinic as directed.    - Continue Toprol-XL BID for better control of PACs/PVC or arrhythmias- states that he is feeling better since medication has been increased  - Continue Entresto to the 49-51 BID, tolerating well  - Blood pressure at goal today  - Counseled on the importance of following a low sodium, low-cholesterol, heart healthy diet and exercise as tolerated  - Patient will need annual Echos given MVR with mechanical mitral valve          Follow up in cardiology clinic in 4 months or sooner if needed   Follow up with PCP as directed

## 2023-10-16 LAB — NONINV COLON CA DNA+OCC BLD SCRN STL QL: NORMAL

## 2023-10-17 ENCOUNTER — OFFICE VISIT (OUTPATIENT)
Dept: CARDIOLOGY | Facility: CLINIC | Age: 53
End: 2023-10-17
Payer: MEDICAID

## 2023-10-17 DIAGNOSIS — I47.9 PAROXYSMAL TACHYCARDIA: ICD-10-CM

## 2023-10-17 DIAGNOSIS — I34.0 SEVERE MITRAL VALVE REGURGITATION: Primary | ICD-10-CM

## 2023-10-17 DIAGNOSIS — Z95.2 S/P MVR (MITRAL VALVE REPLACEMENT): ICD-10-CM

## 2023-10-17 DIAGNOSIS — I33.0 INFECTIVE ENDOCARDITIS OF MITRAL VALVE: ICD-10-CM

## 2023-10-17 PROCEDURE — 99214 OFFICE O/P EST MOD 30 MIN: CPT | Mod: PBBFAC

## 2023-10-19 DIAGNOSIS — Z95.2 S/P MVR (MITRAL VALVE REPLACEMENT): ICD-10-CM

## 2023-10-19 DIAGNOSIS — I50.40 COMBINED SYSTOLIC AND DIASTOLIC HEART FAILURE, UNSPECIFIED HF CHRONICITY: ICD-10-CM

## 2023-10-19 DIAGNOSIS — I47.9 PAROXYSMAL TACHYCARDIA: ICD-10-CM

## 2023-10-19 RX ORDER — SACUBITRIL AND VALSARTAN 49; 51 MG/1; MG/1
1 TABLET, FILM COATED ORAL 2 TIMES DAILY
Qty: 90 TABLET | Refills: 2 | Status: SHIPPED | OUTPATIENT
Start: 2023-10-19 | End: 2024-01-17 | Stop reason: SDUPTHER

## 2023-10-31 ENCOUNTER — ANTI-COAG VISIT (OUTPATIENT)
Dept: CARDIOLOGY | Facility: HOSPITAL | Age: 53
End: 2023-10-31
Payer: MEDICAID

## 2023-10-31 DIAGNOSIS — Z79.01 ANTICOAGULATION MONITORING, INR RANGE 2.5-3.5: ICD-10-CM

## 2023-10-31 DIAGNOSIS — Z95.2 HISTORY OF MITRAL VALVE REPLACEMENT WITH MECHANICAL VALVE: Primary | ICD-10-CM

## 2023-10-31 LAB
CTP QC/QA: YES
INR PPP: 2.5 (ref 2–3)
PROTHROMBIN TIME, POC: 29.9 (ref 2–3)

## 2023-10-31 PROCEDURE — 85610 PROTHROMBIN TIME: CPT

## 2023-10-31 PROCEDURE — 99211 OFF/OP EST MAY X REQ PHY/QHP: CPT

## 2023-11-07 LAB — NONINV COLON CA DNA+OCC BLD SCRN STL QL: NORMAL

## 2023-11-13 DIAGNOSIS — Z95.2 S/P MVR (MITRAL VALVE REPLACEMENT): ICD-10-CM

## 2023-11-13 DIAGNOSIS — I47.9 PAROXYSMAL TACHYCARDIA: ICD-10-CM

## 2023-11-13 DIAGNOSIS — I50.40 COMBINED SYSTOLIC AND DIASTOLIC HEART FAILURE, UNSPECIFIED HF CHRONICITY: ICD-10-CM

## 2023-11-13 RX ORDER — SACUBITRIL AND VALSARTAN 49; 51 MG/1; MG/1
1 TABLET, FILM COATED ORAL 2 TIMES DAILY
Qty: 60 TABLET | Refills: 6 | Status: SHIPPED | OUTPATIENT
Start: 2023-11-13 | End: 2023-12-18 | Stop reason: SDUPTHER

## 2023-11-28 ENCOUNTER — ANTI-COAG VISIT (OUTPATIENT)
Dept: CARDIOLOGY | Facility: HOSPITAL | Age: 53
End: 2023-11-28
Payer: MEDICAID

## 2023-11-28 DIAGNOSIS — Z95.2 HISTORY OF MITRAL VALVE REPLACEMENT WITH MECHANICAL VALVE: Primary | ICD-10-CM

## 2023-11-28 DIAGNOSIS — Z79.01 ANTICOAGULATION MONITORING, INR RANGE 2.5-3.5: ICD-10-CM

## 2023-11-28 LAB
CTP QC/QA: YES
INR PPP: 2.2 (ref 2–3)
PROTHROMBIN TIME, POC: 25.9 (ref 2–3)

## 2023-11-28 PROCEDURE — 85610 PROTHROMBIN TIME: CPT

## 2023-11-28 PROCEDURE — 99211 OFF/OP EST MAY X REQ PHY/QHP: CPT

## 2023-11-28 NOTE — PROGRESS NOTES
POC pt/inr performed.  Pt will continue with dosage increase.  Please see anticoagulation calendar.  He will hold of of his greens for the rest of the week.

## 2023-12-18 DIAGNOSIS — Z95.2 S/P MVR (MITRAL VALVE REPLACEMENT): ICD-10-CM

## 2023-12-18 DIAGNOSIS — I47.9 PAROXYSMAL TACHYCARDIA: ICD-10-CM

## 2023-12-18 DIAGNOSIS — I50.40 COMBINED SYSTOLIC AND DIASTOLIC HEART FAILURE, UNSPECIFIED HF CHRONICITY: ICD-10-CM

## 2023-12-19 ENCOUNTER — ANTI-COAG VISIT (OUTPATIENT)
Dept: CARDIOLOGY | Facility: HOSPITAL | Age: 53
End: 2023-12-19
Payer: MEDICAID

## 2023-12-19 DIAGNOSIS — Z79.01 ANTICOAGULATION MONITORING, INR RANGE 2.5-3.5: ICD-10-CM

## 2023-12-19 DIAGNOSIS — Z95.2 HISTORY OF MITRAL VALVE REPLACEMENT WITH MECHANICAL VALVE: Primary | ICD-10-CM

## 2023-12-19 LAB
CTP QC/QA: YES
INR PPP: 3 (ref 2–3)
PROTHROMBIN TIME, POC: 36.1 (ref 2–3)

## 2023-12-19 PROCEDURE — 85610 PROTHROMBIN TIME: CPT

## 2023-12-19 PROCEDURE — 99211 OFF/OP EST MAY X REQ PHY/QHP: CPT

## 2023-12-19 RX ORDER — SACUBITRIL AND VALSARTAN 49; 51 MG/1; MG/1
1 TABLET, FILM COATED ORAL 2 TIMES DAILY
Qty: 120 TABLET | Refills: 4 | Status: SHIPPED | OUTPATIENT
Start: 2023-12-19 | End: 2023-12-20 | Stop reason: SDUPTHER

## 2023-12-20 ENCOUNTER — TELEPHONE (OUTPATIENT)
Dept: CARDIOLOGY | Facility: CLINIC | Age: 53
End: 2023-12-20
Payer: MEDICAID

## 2023-12-20 DIAGNOSIS — I50.40 COMBINED SYSTOLIC AND DIASTOLIC HEART FAILURE, UNSPECIFIED HF CHRONICITY: ICD-10-CM

## 2023-12-20 DIAGNOSIS — I50.42 CHRONIC COMBINED SYSTOLIC AND DIASTOLIC HEART FAILURE: Primary | ICD-10-CM

## 2023-12-20 DIAGNOSIS — I47.9 PAROXYSMAL TACHYCARDIA: ICD-10-CM

## 2023-12-20 DIAGNOSIS — Z95.2 S/P MVR (MITRAL VALVE REPLACEMENT): ICD-10-CM

## 2023-12-20 RX ORDER — SACUBITRIL AND VALSARTAN 49; 51 MG/1; MG/1
1 TABLET, FILM COATED ORAL 2 TIMES DAILY
Qty: 180 TABLET | Refills: 0 | Status: SHIPPED | OUTPATIENT
Start: 2023-12-20 | End: 2024-01-17 | Stop reason: SDUPTHER

## 2023-12-20 RX ORDER — LOSARTAN POTASSIUM 50 MG/1
50 TABLET ORAL DAILY
Qty: 90 TABLET | Refills: 3 | Status: SHIPPED | OUTPATIENT
Start: 2023-12-20 | End: 2024-02-19

## 2023-12-20 NOTE — TELEPHONE ENCOUNTER
Patient notified of new prescription sent to his pharmacy. Informed patient of StepOut prescription assistance program. Patient will come to clinic to apply. Informed patient of documents needed.

## 2023-12-20 NOTE — TELEPHONE ENCOUNTER
Patient called stating he no longer receives Medicaid and now he can't afford the Entresto. Would like to know are there any medication assistant programs? Pt also stated he's completely out of his Entresto.

## 2024-01-04 ENCOUNTER — TELEPHONE (OUTPATIENT)
Dept: CARDIOLOGY | Facility: CLINIC | Age: 54
End: 2024-01-04
Payer: MEDICAID

## 2024-01-04 NOTE — TELEPHONE ENCOUNTER
Called Mr. Power and he did not answer, (He had called in B/P's) Home B/P readings range 107-138/66-90's. called his daughter. Instructions are to continue current medications, keep future appointments and bring financial papers that were discussed in a previous call from this clinic to apply for assistance in getting Entresto.

## 2024-01-16 ENCOUNTER — ANTI-COAG VISIT (OUTPATIENT)
Dept: CARDIOLOGY | Facility: HOSPITAL | Age: 54
End: 2024-01-16
Payer: MEDICAID

## 2024-01-16 DIAGNOSIS — Z95.2 HISTORY OF MITRAL VALVE REPLACEMENT WITH MECHANICAL VALVE: Primary | ICD-10-CM

## 2024-01-16 DIAGNOSIS — Z79.01 ANTICOAGULATION MONITORING, INR RANGE 2.5-3.5: ICD-10-CM

## 2024-01-16 LAB
CTP QC/QA: YES
INR PPP: 2.9 (ref 2–3)
PROTHROMBIN TIME, POC: 35.2 (ref 2–3)

## 2024-01-16 PROCEDURE — 85610 PROTHROMBIN TIME: CPT

## 2024-01-16 PROCEDURE — 99211 OFF/OP EST MAY X REQ PHY/QHP: CPT

## 2024-01-17 ENCOUNTER — OFFICE VISIT (OUTPATIENT)
Dept: INTERNAL MEDICINE | Facility: CLINIC | Age: 54
End: 2024-01-17
Payer: MEDICAID

## 2024-01-17 VITALS
HEIGHT: 67 IN | RESPIRATION RATE: 20 BRPM | SYSTOLIC BLOOD PRESSURE: 133 MMHG | DIASTOLIC BLOOD PRESSURE: 79 MMHG | OXYGEN SATURATION: 98 % | BODY MASS INDEX: 21.66 KG/M2 | WEIGHT: 138 LBS | HEART RATE: 98 BPM | TEMPERATURE: 98 F

## 2024-01-17 DIAGNOSIS — I47.9 PAROXYSMAL TACHYCARDIA: ICD-10-CM

## 2024-01-17 DIAGNOSIS — I50.40 COMBINED SYSTOLIC AND DIASTOLIC HEART FAILURE, UNSPECIFIED HF CHRONICITY: Primary | ICD-10-CM

## 2024-01-17 DIAGNOSIS — Z95.2 S/P MVR (MITRAL VALVE REPLACEMENT): ICD-10-CM

## 2024-01-17 DIAGNOSIS — I10 HYPERTENSION, UNSPECIFIED TYPE: ICD-10-CM

## 2024-01-17 DIAGNOSIS — R00.2 PALPITATIONS: ICD-10-CM

## 2024-01-17 DIAGNOSIS — Z12.11 COLON CANCER SCREENING: ICD-10-CM

## 2024-01-17 PROCEDURE — 99214 OFFICE O/P EST MOD 30 MIN: CPT | Mod: PBBFAC

## 2024-01-17 RX ORDER — SACUBITRIL AND VALSARTAN 49; 51 MG/1; MG/1
1 TABLET, FILM COATED ORAL 2 TIMES DAILY
Qty: 90 TABLET | Refills: 2 | Status: SHIPPED | OUTPATIENT
Start: 2024-01-17 | End: 2024-02-19 | Stop reason: SDUPTHER

## 2024-01-17 NOTE — PROGRESS NOTES
[facility] INTERNAL MEDICINE  Post-wards visit     SUBJECTIVE:      Chief Complaint: Follow-up (Routine follow up for HTN. )     HPI: Flako Power is a 53 y.o. yo male w/ PMH of HTN and endocarditis s/p MVR 2/8/2023 who presents for post-wards follow-up.  Patient doing well today, states he occasionally experiences chest tightness with lifting heavy objects (works as a ) but overall has been doing well.  He denies any recent episodes of chest pain, sob, palpitations, orthopnea, LE swelling, PND, or diaphoresis. He endorses good compliance with medication regimen and denies any alcohol, tobacco, or recreational drug use.  Following with cardiology and has upcoming visit 2/15/2024.      Past Medical History:   has a past medical history of Cough, Hypertension, Infective endocarditis, Mitral incompetence, and PICC (peripherally inserted central catheter) in place.     Past Surgical History:   has a past surgical history that includes Left heart catheterization (N/A, 01/19/2023); Abdominal surgery; Tonsillectomy; and Mitral valve replacement (N/A, 2/8/2023).     Family History:  family history includes Breast cancer in his mother; Diabetes type I in his mother; Hypertension in his mother; No Known Problems in his father.     Social History:   reports that he has never smoked. He has never used smokeless tobacco. He reports that he does not currently use alcohol. He reports that he does not use drugs.     Allergies:  has No Known Allergies.     Home Medications:  Prior to Admission medications    Medication Sig Start Date End Date Taking? Authorizing Provider   amLODIPine (NORVASC) 5 MG tablet Take 1 tablet (5 mg total) by mouth once daily. 1/26/23 1/26/24 Yes Alexia Alvarez,    warfarin (COUMADIN) 4 MG tablet Take 2 tabs (8 mg) oral at bedtime 4/17/23  Yes Magali Barboza MD   metoprolol tartrate (LOPRESSOR) 25 MG tablet Take 0.5 tablets (12.5 mg total) by mouth 2 (two) times daily. 5/2/23 5/8/23 Yes  "Tung Cosby,    baclofen (LIORESAL) 10 MG tablet Take 1 tablet (10 mg total) by mouth 3 (three) times daily as needed (muscle spasms). 12/20/22 12/27/22  Garfield Echeverria Jr., FNP   diclofenac (VOLTAREN) 75 MG EC tablet Take 1 tablet (75 mg total) by mouth 2 (two) times daily.  Patient not taking: Reported on 5/8/2023 12/16/22   Mandeep Qureshi MD   metoprolol succinate (TOPROL-XL) 25 MG 24 hr tablet Take 1 tablet (25 mg total) by mouth once daily. 5/8/23 5/7/24  Cheyenne Jean MD   mupirocin (BACTROBAN) 2 % ointment by Nasal route once daily. Apply to inside of both nostrils the night before surgery and the morning of surgery.  Patient not taking: Reported on 5/8/2023 1/30/23   Jose Ramon Rodrigues MD   sacubitriL-valsartan (ENTRESTO) 24-26 mg per tablet Take 1 tablet by mouth 2 (two) times daily. 5/8/23   Cheyenne Jean MD     Review of Systems   Constitutional:  Negative for chills and fever.   Eyes:  Negative for blurred vision and double vision.   Respiratory:  Negative for cough and hemoptysis.    Cardiovascular:  Negative for chest pain, palpitations, orthopnea, claudication and leg swelling.        Endorses chest tightness associated with lifting heavy objects    Gastrointestinal:  Negative for abdominal pain, blood in stool, constipation, diarrhea, heartburn, melena, nausea and vomiting.   Genitourinary:  Negative for dysuria.   Musculoskeletal:  Negative for myalgias and neck pain.   Neurological:  Negative for dizziness and headaches.         OBJECTIVE:     Vital signs:   /79 (BP Location: Left arm, Patient Position: Sitting, BP Method: Large (Automatic))   Pulse 98   Temp 98.2 °F (36.8 °C)   Resp 20   Ht 5' 7" (1.702 m)   Wt 62.6 kg (138 lb)   SpO2 98%   BMI 21.61 kg/m²      Physical Examination:  General: Patient resting comfortably, in no acute distress   Eye: PERRLA, EOMI, clear conjunctiva, eyelids normal  HENT: Head-normocephalic and atraumatic  Neck: full range of motion, no thyromegaly " or lymphadenopathy, trachea midline, supple, no palpable thyroid nodules  Respiratory: clear to auscultation bilaterally without wheezes, rales, rhonchi  Cardiovascular: irregular rhythm w/ normal rate, no murmer's noted. No gallops or rubs no JVD.  Capillary refill within normal limits.  Gastrointestinal: soft, non-tender, non-distended with normal bowel sounds, without masses to palpation  Genitourinary: no CVA tenderness to palpation  Musculoskeletal: full range of motion of all extremities/spine without limitation or discomfort  Integumentary: no rashes or skin lesions present  Neurologic: no signs of peripheral neurological deficit, motor/sensory function intact  Psychiatric:  alert and oriented, cognitive function intact, cooperative with exam, good eye contact, judgement and insight intact, mood and affect full range.     Labs:  CMP:   Lab Results   Component Value Date    GLUCOSE 76 06/05/2023    CALCIUM 9.0 06/05/2023    ALBUMIN 4.0 06/05/2023     06/05/2023    K 3.8 06/05/2023    CO2 29 06/05/2023    BUN 12.4 06/05/2023    CREATININE 0.98 06/05/2023    ALKPHOS 94 06/05/2023    ALT 18 06/05/2023    AST 25 06/05/2023    BILITOT 0.5 06/05/2023      CBC:   Lab Results   Component Value Date    WBC 7.44 05/02/2023    HGB 13.6 (L) 05/02/2023    HCT 39.7 (L) 05/02/2023    MCV 77.1 (L) 05/02/2023    RDW 16.8 05/02/2023     Coagulation:   Lab Results   Component Value Date    INR 2.9 01/16/2024     DM:   Lab Results   Component Value Date    HGBA1C 5.2 05/02/2023    .5 05/02/2023    CREATININE 0.98 06/05/2023     Thyroid:   Lab Results   Component Value Date    TSH 1.463 05/02/2023     LFTs:   Lab Results   Component Value Date    LABPROT 7.8 06/05/2023    ALBUMIN 4.0 06/05/2023    AST 25 06/05/2023    ALT 18 06/05/2023    ALKPHOS 94 06/05/2023         ASSESSMENT & PLAN:     HFrEF w/ EF 36%   Atrial fibrillation, rate controlled   Hx of Endocarditis s/p MVR - treated x6 weeks w/ IV antibiotics in  January 2023   Hypertension   -echo performed 5/2/2023 showing EF 36% with grade 1 diastolic dysfunction   -LHC performed 1/19/2023 showing non obstructive CAD   -continue Toprol-XL 25mg, previously taking Entresto for GDMT but patient stopped secondary to insurance coverage.  Working with cardiology to figure out financial assistance but has been taking Losartan 50mg in the meantime.  Patient states he received a letter recently stating he is approved for medication.  Will refill Entresto today.  Informed patient to call if he is still experiencing financial difficulty with medication.  If so, I will increase his Losartan dose as his blood pressure has been running high per home readings.    -continue coumadin for anticoagulation, following with coumadin clinic at [facility]. Goal INR 2.5-3.5, last INR 1/16/2024 within goal.    -given strict ED precautions to return if symptoms worsen or he has any new episodes of severe palpitations, chest pain, sob, or LE swelling.   -continue to follow with cardiology, next appt 2/15/2024.     Hyperlipidemia   -goal LDL <100, last lipid panel with   -ASCVD risk score 11.4% with recommendation for moderate-to-high intensity statin   -patient would like to continue with lifestyle modifications at this time and is not interested in medication therapy.  I will check a lipid panel prior to patients next visit and continue ongoing discussions     Cologuard ordered previously x3 with inadequate sampling.  Sending referral for colonoscopy today.    Patient continuing to decline vaccines   Labs at next visit     Cheyenne Jean MD  [facility] Internal Medicine, HO-1

## 2024-02-15 ENCOUNTER — ANTI-COAG VISIT (OUTPATIENT)
Dept: CARDIOLOGY | Facility: HOSPITAL | Age: 54
End: 2024-02-15
Payer: MEDICAID

## 2024-02-15 DIAGNOSIS — Z95.2 HISTORY OF MITRAL VALVE REPLACEMENT WITH MECHANICAL VALVE: Primary | ICD-10-CM

## 2024-02-15 DIAGNOSIS — Z79.01 ANTICOAGULATION MONITORING, INR RANGE 2.5-3.5: ICD-10-CM

## 2024-02-15 LAB
CTP QC/QA: YES
INR PPP: 2.9 (ref 2–3)
PROTHROMBIN TIME, POC: 39 (ref 2–3)

## 2024-02-15 PROCEDURE — 99211 OFF/OP EST MAY X REQ PHY/QHP: CPT

## 2024-02-15 PROCEDURE — 85610 PROTHROMBIN TIME: CPT

## 2024-02-19 ENCOUNTER — OFFICE VISIT (OUTPATIENT)
Dept: CARDIOLOGY | Facility: CLINIC | Age: 54
End: 2024-02-19
Payer: MEDICAID

## 2024-02-19 VITALS
TEMPERATURE: 99 F | DIASTOLIC BLOOD PRESSURE: 90 MMHG | HEIGHT: 60 IN | OXYGEN SATURATION: 100 % | HEART RATE: 64 BPM | BODY MASS INDEX: 25.52 KG/M2 | WEIGHT: 130 LBS | RESPIRATION RATE: 20 BRPM | SYSTOLIC BLOOD PRESSURE: 120 MMHG

## 2024-02-19 DIAGNOSIS — Z95.2 S/P MVR (MITRAL VALVE REPLACEMENT): ICD-10-CM

## 2024-02-19 DIAGNOSIS — I50.42 CHRONIC COMBINED SYSTOLIC AND DIASTOLIC HEART FAILURE: ICD-10-CM

## 2024-02-19 DIAGNOSIS — I33.0 INFECTIVE ENDOCARDITIS OF MITRAL VALVE: Primary | ICD-10-CM

## 2024-02-19 DIAGNOSIS — I34.0 SEVERE MITRAL VALVE REGURGITATION: ICD-10-CM

## 2024-02-19 DIAGNOSIS — I47.9 PAROXYSMAL TACHYCARDIA: ICD-10-CM

## 2024-02-19 PROBLEM — I50.40 COMBINED SYSTOLIC AND DIASTOLIC HEART FAILURE: Status: ACTIVE | Noted: 2024-02-19

## 2024-02-19 PROCEDURE — 99214 OFFICE O/P EST MOD 30 MIN: CPT | Mod: PBBFAC | Performed by: INTERNAL MEDICINE

## 2024-02-19 RX ORDER — SACUBITRIL AND VALSARTAN 49; 51 MG/1; MG/1
1 TABLET, FILM COATED ORAL 2 TIMES DAILY
Qty: 90 TABLET | Refills: 3 | Status: SHIPPED | OUTPATIENT
Start: 2024-02-19 | End: 2024-04-10 | Stop reason: SDUPTHER

## 2024-02-19 NOTE — PROGRESS NOTES
Cardiology attending addendum  Patient was seen and examined with Julianne Benjamin MS3. Agree with plan as outlined above.  Patient with history of mitral valve endocarditis status post mechanical MVR in February 2023 with [employer] ats Number 31 valve (and left atrial appendage ligation).  At the time EF was 60% and he had clean coronaries.  In May 2023 an echocardiogram revealed EF of 36%.  He has been taking Entresto 49/51 mg bid as well as Toprol 25 mg bid.  We will repeat an echocardiogram and if EF continues to be reduced will add SGLT2i and Aldactone.  Patient compliance with Coumadin and has no bleeding.  Last INR 2.9.  Patient reports 2 episodes of chest pain.  During 1 of those episodes he took Tums and chest pain resolved.     Chinyere Zurita MD  [facility]

## 2024-02-19 NOTE — PROGRESS NOTES
Cardiology attending addendum  Patient was seen and examined with Julianne Benjamin MS3. Agree with plan as outlined above.  Patient with history of mitral valve endocarditis status post mechanical MVR in February 2023 with Medtronic ats Number 31 valve (and left atrial appendage ligation).  At the time EF was 60% and he had clean coronaries.  In May 2023 an echocardiogram revealed EF of 36%.  He has been taking Entresto 49/51 mg bid as well as Toprol 25 mg bid.  We will repeat an echocardiogram and if EF continues to be reduced will add SGLT2i and Aldactone.  Patient compliance with Coumadin and has no bleeding.  Last INR 2.9.  Patient reports 2 episodes of chest pain.  During 1 of those episodes he took Tums and chest pain resolved.    Chinyere Zurita MD  [facility]      02/19/2024 9:55 AM    Subjective:     CHIEF COMPLAINT:   Chief Complaint   Patient presents with    4 mo follow up visit      Reports one episode of chest pain the he thinks may have been a gas pain.                            HPI:    Mr. Flako Power is a 53 y.o. male.   The patient comes for a follow-up appointment.  The patient has history of  mitral valve regurgitation with mechanical valve replacement, HTN, infective endocarditis, Afib, and HFrEF of 36%. He works as a  3x a week. He says he takes all of his medication, but last month Medicaid said he made too much and didn't cover his Entresto. He was just able to restart Entresto this month. He says his great uncle and 5 out of 7 of his great uncle's children had to have open heart surgery. He experiences chest pain but it's associated with gas and relieved with tums.      CP:  The patient has chest discomfort every now and then, but feels more like gas pain.      SOB:  The patient denies shortness of breath No SAN    EDEMA:  The patient denies edema.       ORTHOPNEA:  The patient denies orthopnea.  No PND.      SYNCOPE:  The patient denies near syncope.  No syncope.   No  dizziness.    PALPITATIONS:  The patient has no palpitations.    LEVEL OF EXERTION:  The patient works and does not have symptoms with this level of exertion.  The patient's level of exertion is limited.    The patient denies recent cardiac testing.     Past Medical History    Patient Active Problem List   Diagnosis    Severe mitral valve regurgitation    Bacteremia    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    Endocarditis    S/P MVR (mitral valve replacement)    Paroxysmal tachycardia       Surgical History    Past Surgical History:   Procedure Laterality Date    ABDOMINAL SURGERY      LEFT HEART CATHETERIZATION N/A 01/19/2023    Procedure: Left heart cath;  Surgeon: Speedy Rendon Jr., MD;  Location: [facility] CATH LAB;  Service: Cardiology;  Laterality: N/A;  Diagnostic Procedure Only- Severe MR Awaiting Surgery.    MITRAL VALVE REPLACEMENT N/A 2/8/2023    Procedure: REPLACEMENT, MITRAL VALVE;  Surgeon: Jose Ramon Rodrigues MD;  Location: [facility] OR;  Service: Cardiology;  Laterality: N/A;  ECHO NOTIFIED    TONSILLECTOMY         Social History    Social History     Socioeconomic History    Marital status: Single   Tobacco Use    Smoking status: Never    Smokeless tobacco: Never   Substance and Sexual Activity    Alcohol use: Not Currently    Drug use: Never    Sexual activity: Not Currently   Social History Narrative    ** Merged History Encounter **            Family History    Family History   Problem Relation Age of Onset    Diabetes type I Mother     Breast cancer Mother     Hypertension Mother     No Known Problems Father        Allergy  Review of patient's allergies indicates:  No Known Allergies    Current Medications    Current Outpatient Medications:     metoprolol succinate (TOPROL-XL) 25 MG 24 hr tablet, Take 1 tablet (25 mg total) by mouth 2 (two) times daily., Disp: 60 tablet, Rfl: 11    sacubitriL-valsartan (ENTRESTO) 49-51 mg per tablet, Take 1 tablet by mouth 2 (two) times daily.,  "Disp: 90 tablet, Rfl: 2    warfarin (COUMADIN) 4 MG tablet, Take 2 tabs (8 mg) oral at bedtime, Disp: 60 tablet, Rfl: 1    ROS:                                                                                                                                                                CONSTITUTIONAL:    No fever.  No night sweats.  RESPIRATORY:  No cough.  No hemoptysis.  No wheezing.  SKIN:  No poor wound healing.  No rash.  CARDIOVASCULAR:  No claudication.  No cyanosis.     HEMATOLOGY:   No adenopathy.  No easy bruising.   MUSCULOSKELETAL:  No muscle cramp.  No muscle weakness.  GASTROENTEROLOGY:  Heartburn every now and then relieved with tums. No melena.    NEUROLOGIC:  No dizziness.  No generalized weakness.     Objective:     PE:  Blood pressure (!) 114/90, pulse 64, temperature 99.1 °F (37.3 °C), temperature source Oral, resp. rate 20, height 4' 9" (1.448 m), weight 59 kg (130 lb), SpO2 100 %.   BP Readings from Last 3 Encounters:   02/19/24 (!) 114/90   01/17/24 133/79   10/17/23 (!) (P) 139/92       Wt Readings from Last 3 Encounters:   02/19/24 59 kg (130 lb)   01/17/24 62.6 kg (138 lb)   10/17/23 (P) 62 kg (136 lb 11 oz)     BMI 28.13 kg/m^2    GENERAL:  Ambulates.  CONSTITUTIONAL:  No acute distress.  Not ill appearing.  NECK:  No cervical adenopathy.  No carotid bruit.  CARDIOVASCULAR:  Irregular rhythm and rate.Can hear mechanical valve.   PULMONARY:  No respiratory distress.  No wheezing.  No crackles.  ABDOMINAL:  No distention.  No tenderness.  MUSCULOSKELETAL:  No deformity.  No edema.  SKIN:  No bruising.  No rash.  NEUROLOGICAL:  Oriented x 3.  No weakness.                                                                                                                                                                                                                                                                                                                                                         "                                                                                                                       CARDIAC TESTING:  EKG  Results for orders placed or performed in visit on 05/08/23   IN OFFICE EKG 12-LEAD (to [place])    Collection Time: 05/08/23  1:46 PM    Narrative    Test Reason : R00.2,    Vent. Rate : 105 BPM     Atrial Rate : 163 BPM     P-R Int : 000 ms          QRS Dur : 090 ms      QT Int : 348 ms       P-R-T Axes : 000 023 060 degrees     QTc Int : 459 ms    Atrial fibrillation with rapid ventricular response  Minimal voltage criteria for LVH, may be normal variant  Abnormal ECG  Confirmed by Parish Silva MD ([id number]) on 5/9/2023 6:17:33 AM    Referred By: IVAN GUADALUPE           Confirmed By:Parish Silva MD      Echocardiogram  Results for orders placed during the hospital encounter of 05/02/23    Echo    Interpretation Summary  · The left ventricle is normal in size with eccentric hypertrophy and moderately decreased systolic function.  · The estimated ejection fraction is 36%.  · Grade I left ventricular diastolic dysfunction.  · Mild right ventricular enlargement with mildly reduced right ventricular systolic function.  · Mild right atrial enlargement.  · Mild pulmonic regurgitation.  · There is a mechanical mitral valve prosthesis.  · Mild tricuspid regurgitation.  · Normal central venous pressure (3 mmHg).  · The estimated PA systolic pressure is 25 mmHg.  · There is no pulmonary hypertension.  · Mild left atrial enlargement.    Stress Test  No results found for this or any previous visit.     Coronary Angiogram  Results for orders placed during the hospital encounter of 01/16/23    Cardiac catheterization    Conclusion    There was no obstructive coronary artery disease.    The procedure log was documented by No documenter listed and verified by Speedy Rendon Jr, MD.    Date: 1/19/2023  Time: 5:19 PM    Procedure:  Left heart catheterization    Preoperative diagnosis:  Infective  endocarditis    Postoperative diagnosis:  Infective endocarditis    Access:  Right common femoral artery    Estimated blood loss: 10 cc  Complications: None    Summary:  Consent obtained. Risks and benefits discussed with the patient. The patient was brought to the cath lab in a fasting state. The patient was prepped and draped in usual sterile fashion. A preprocedure time-out was performed.    Ultrasound-guided right common femoral arterial access via modified Seldinger technique using a micropuncture kit. A 6 Afghan sheath was inserted into the right common femoral artery.    A 5 Afghan JL3.5 catheter was used to cannulate the left main coronary artery; angiography was performed, and multiple fluoroscopic views were obtained.  A 5 Afghan JR4 catheter was used to cannulate the right coronary artery; angiography was performed, and multiple fluoroscopic views were obtained.    At the end the procedure, all wires and catheters were removed.  Hemostasis achieved with manual compression.    Findings:  There was no obstructive coronary artery disease.    Assessment/Plan:  - No obstructive coronary disease. Mgmt of infective endocarditis as per primary team.    Speedy Rendon MD  Interventional Cardiology/Structural Heart Disease  Cardiovascular [facility]     Holter June 6, 2023   Arrythmia:  Frequent PACs and PVCs.  7 short runs of VT lasting 3-5 beats and at -240.  4 runs of SVT with -220.    Pauses:  Longest pause is 2.2 seconds long.  Pauses >3 seconds are considered significant.  Symptoms:  None reported    Events: During patient marked event the patient is in sinus rhythm with HR 85    Last BMP  BMP  Lab Results   Component Value Date     06/05/2023    K 3.8 06/05/2023    CO2 29 06/05/2023    BUN 12.4 06/05/2023    CREATININE 0.98 06/05/2023    CALCIUM 9.0 06/05/2023    EGFRNORACEVR >60 06/05/2023     Last CBC     Lab Results   Component Value Date    WBC 7.44 05/02/2023    HGB 13.6  "(L) 05/02/2023    HCT 39.7 (L) 05/02/2023    MCV 77.1 (L) 05/02/2023     05/02/2023         Last lipids    Lab Results   Component Value Date    CHOL 192 06/05/2023     Lab Results   Component Value Date    HDL 52 06/05/2023     No results found for: "LDLCALC"  Lab Results   Component Value Date    TRIG 71 06/05/2023       No results found for: "CHOLHDL"    Assessment:   1) Mitral valve replacement for severe mitral regurgitation   2) HTN  3) Infective Endocarditis- resolved     Body mass index is 28.13 kg/m².  Patient is overweight (BMI 25-29.0)  The patient is on metoprolol, Entresto, and warfarin.   refills given for cardiac medications  Tobacco:  denied  Alcohol:  social drinker  Substance abuse:   none  Last PCP visit:  1/17/2024      Plan:     Medications: continue cardiac medications Entresto and metoprolol   Antiplatelet: none  Anticoagulation: Coumadin    Order Echo    Diet:  Avoid processed foods and drinks, sugar, salt, fried/greasy foods, and fast foods  Mediterranean diet and 10 servings of fruits and vegetables per day    Exercise:  activities as tolerated    Follow up:  4 months    Julianne Taveras      Future Appointments   Date Time Provider Department Center   3/14/2024  6:40 AM [facility]IN, [facility]   7/29/2024  7:30 AM Cheyenne Jean MD [facility]        "

## 2024-03-14 ENCOUNTER — ANTI-COAG VISIT (OUTPATIENT)
Dept: CARDIOLOGY | Facility: HOSPITAL | Age: 54
End: 2024-03-14
Payer: MEDICAID

## 2024-03-14 DIAGNOSIS — Z95.2 HISTORY OF MITRAL VALVE REPLACEMENT WITH MECHANICAL VALVE: Primary | ICD-10-CM

## 2024-03-14 DIAGNOSIS — Z79.01 ANTICOAGULATION MONITORING, INR RANGE 2.5-3.5: ICD-10-CM

## 2024-03-14 LAB
CTP QC/QA: YES
INR PPP: 2.7 (ref 2–3)
PROTHROMBIN TIME, POC: 32.9 (ref 2–3)

## 2024-03-14 PROCEDURE — 85610 PROTHROMBIN TIME: CPT

## 2024-03-14 PROCEDURE — 99211 OFF/OP EST MAY X REQ PHY/QHP: CPT

## 2024-04-10 DIAGNOSIS — Z95.2 S/P MVR (MITRAL VALVE REPLACEMENT): ICD-10-CM

## 2024-04-10 DIAGNOSIS — I50.42 CHRONIC COMBINED SYSTOLIC AND DIASTOLIC HEART FAILURE: ICD-10-CM

## 2024-04-10 DIAGNOSIS — I47.9 PAROXYSMAL TACHYCARDIA: ICD-10-CM

## 2024-04-10 RX ORDER — SACUBITRIL AND VALSARTAN 49; 51 MG/1; MG/1
1 TABLET, FILM COATED ORAL 2 TIMES DAILY
Qty: 90 TABLET | Refills: 3 | Status: SHIPPED | OUTPATIENT
Start: 2024-04-10 | End: 2024-04-10 | Stop reason: SDUPTHER

## 2024-04-11 RX ORDER — WARFARIN 4 MG/1
TABLET ORAL
Qty: 60 TABLET | Refills: 1 | Status: SHIPPED | OUTPATIENT
Start: 2024-04-11 | End: 2024-06-04 | Stop reason: SDUPTHER

## 2024-04-11 RX ORDER — SACUBITRIL AND VALSARTAN 49; 51 MG/1; MG/1
1 TABLET, FILM COATED ORAL 2 TIMES DAILY
Qty: 180 TABLET | Refills: 3 | Status: SHIPPED | OUTPATIENT
Start: 2024-04-11

## 2024-04-15 ENCOUNTER — ANTI-COAG VISIT (OUTPATIENT)
Dept: CARDIOLOGY | Facility: HOSPITAL | Age: 54
End: 2024-04-15
Payer: MEDICAID

## 2024-04-15 DIAGNOSIS — Z79.01 ANTICOAGULATION MONITORING, INR RANGE 2.5-3.5: ICD-10-CM

## 2024-04-15 DIAGNOSIS — Z95.2 HISTORY OF MITRAL VALVE REPLACEMENT WITH MECHANICAL VALVE: Primary | ICD-10-CM

## 2024-04-15 LAB
CTP QC/QA: YES
INR PPP: 3.4 (ref 2–3)
PROTHROMBIN TIME, POC: 40.9 (ref 2–3)

## 2024-04-15 PROCEDURE — 85610 PROTHROMBIN TIME: CPT

## 2024-04-15 PROCEDURE — 99211 OFF/OP EST MAY X REQ PHY/QHP: CPT

## 2024-05-09 NOTE — ANESTHESIA PREPROCEDURE EVALUATION
01/18/2023  Flako Power is a 52 y.o., male .with complaints of back pain that began mid December 2022 and left knee pain. He reports that the pain beacme progressively worse that began to radiate bilaterally to his abdomen with an accompanying nonproductive cough. He denies CP, SOB, palps, fever, or chills. On arrival to the ER, he was found to be febrile (102.7 F), tachycardic, & hypertensive. Sginificant labs include WBC 12.3, ESR 63, CRP 89. CTA chest demonstrated an irregularity of T8 inferior endplate that may relate to degenerative changes but cannot exclude discitis/osteomyelitis. His initial blood cultures are positive. [facility] has been consulted to perform a LAXMI to further evaluate for possible MV endocarditis.          Pre-op Assessment    I have reviewed the Patient Summary Reports.     I have reviewed the Nursing Notes. I have reviewed the NPO Status.   I have reviewed the Medications.     Review of Systems      Physical Exam  General: Well nourished, Cooperative, Alert and Oriented    Airway:  Mallampati: II   Mouth Opening: Normal  TM Distance: Normal  Tongue: Normal  Neck ROM: Normal ROM    Dental:  Intact        Anesthesia Plan  Type of Anesthesia, risks & benefits discussed:    Anesthesia Type: Gen Natural Airway  Intra-op Monitoring Plan: Standard ASA Monitors  Post Op Pain Control Plan: multimodal analgesia  Induction:  IV  Informed Consent: Informed consent signed with the Patient and all parties understand the risks and agree with anesthesia plan.  All questions answered. Patient consented to blood products? Yes  ASA Score: 3  Day of Surgery Review of History & Physical: H&P Update referred to the surgeon/provider.    Ready For Surgery From Anesthesia Perspective.     .       [No Abdominal Bruit] : a ~M bruit was not heard ~T in the abdomen [No Edema] : there was no peripheral edema [No Palpable Aorta] : no palpable aorta [Normal Appearance] : normal in appearance [No Masses] : no palpable masses [No Nipple Discharge] : no nipple discharge [No Axillary Lymphadenopathy] : no axillary lymphadenopathy [Coordination Grossly Intact] : coordination grossly intact [No Focal Deficits] : no focal deficits [Normal Gait] : normal gait [Normal] : affect was normal and insight and judgment were intact [de-identified] : bilateral feet are normal no swelling

## 2024-05-14 ENCOUNTER — ANTI-COAG VISIT (OUTPATIENT)
Dept: CARDIOLOGY | Facility: HOSPITAL | Age: 54
End: 2024-05-14
Payer: MEDICAID

## 2024-05-14 DIAGNOSIS — Z79.01 ANTICOAGULATION MONITORING, INR RANGE 2.5-3.5: ICD-10-CM

## 2024-05-14 DIAGNOSIS — Z95.2 HISTORY OF MITRAL VALVE REPLACEMENT WITH MECHANICAL VALVE: Primary | ICD-10-CM

## 2024-05-14 LAB
CTP QC/QA: YES
INR PPP: 2.7 (ref 2–3)
PROTHROMBIN TIME, POC: NORMAL (ref 2–3)

## 2024-05-14 PROCEDURE — 99211 OFF/OP EST MAY X REQ PHY/QHP: CPT

## 2024-05-14 PROCEDURE — 85610 PROTHROMBIN TIME: CPT

## 2024-05-28 ENCOUNTER — HOSPITAL ENCOUNTER (OUTPATIENT)
Dept: CARDIOLOGY | Facility: HOSPITAL | Age: 54
Discharge: HOME | End: 2024-05-28
Attending: INTERNAL MEDICINE
Payer: MEDICAID

## 2024-05-28 VITALS
HEIGHT: 60 IN | SYSTOLIC BLOOD PRESSURE: 142 MMHG | DIASTOLIC BLOOD PRESSURE: 84 MMHG | WEIGHT: 130 LBS | BODY MASS INDEX: 25.52 KG/M2

## 2024-05-28 DIAGNOSIS — I50.42 CHRONIC COMBINED SYSTOLIC AND DIASTOLIC HEART FAILURE: ICD-10-CM

## 2024-05-28 DIAGNOSIS — Z95.2 S/P MVR (MITRAL VALVE REPLACEMENT): ICD-10-CM

## 2024-05-28 LAB
AV INDEX (PROSTH): 0.37
AV MEAN GRADIENT: 4 MMHG
AV PEAK GRADIENT: 7 MMHG
AV VELOCITY RATIO: 0.43
BSA FOR ECHO PROCEDURE: 1.54 M2
CV ECHO LV RWT: 0.54 CM
DOP CALC AO PEAK VEL: 1.32 M/S
DOP CALC AO VTI: 24 CM
DOP CALC LVOT PEAK VEL: 0.57 M/S
DOP CALC MV VTI: 25.9 CM
DOP CALCLVOT PEAK VEL VTI: 8.9 CM
E WAVE DECELERATION TIME: 101.46 MSEC
E/A RATIO: 1.08
E/E' RATIO: 15.55 M/S
ECHO LV POSTERIOR WALL: 1.13 CM (ref 0.6–1.1)
FRACTIONAL SHORTENING: 20 % (ref 28–44)
HR MV ECHO: 80 BPM
INTERVENTRICULAR SEPTUM: 1.13 CM (ref 0.6–1.1)
LEFT ATRIUM SIZE: 4.78 CM
LEFT INTERNAL DIMENSION IN SYSTOLE: 3.37 CM (ref 2.1–4)
LEFT VENTRICLE DIASTOLIC VOLUME INDEX: 52.31 ML/M2
LEFT VENTRICLE DIASTOLIC VOLUME: 78.46 ML
LEFT VENTRICLE MASS INDEX: 109 G/M2
LEFT VENTRICLE SYSTOLIC VOLUME INDEX: 31 ML/M2
LEFT VENTRICLE SYSTOLIC VOLUME: 46.56 ML
LEFT VENTRICULAR INTERNAL DIMENSION IN DIASTOLE: 4.2 CM (ref 3.5–6)
LEFT VENTRICULAR MASS: 163.25 G
LV LATERAL E/E' RATIO: 12.21 M/S
LV SEPTAL E/E' RATIO: 21.38 M/S
LVOT MG: 0.62 MMHG
LVOT MV: 0.36 CM/S
MV MEAN GRADIENT: 4 MMHG
MV PEAK A VEL: 1.59 M/S
MV PEAK E VEL: 1.71 M/S
MV PEAK GRADIENT: 11 MMHG
MV STENOSIS PRESSURE HALF TIME: 29.42 MS
MV VALVE AREA P 1/2 METHOD: 7.48 CM2
OHS CV RV/LV RATIO: 0.81 CM
OHS LV EJECTION FRACTION SIMPSONS BIPLANE MOD: 47 %
PISA TR MAX VEL: 2.52 M/S
RA PRESSURE ESTIMATED: 3 MMHG
RIGHT ATRIAL AREA: 22 CM2
RIGHT VENTRICULAR END-DIASTOLIC DIMENSION: 3.41 CM
RV TB RVSP: 6 MMHG
TDI LATERAL: 0.14 M/S
TDI SEPTAL: 0.08 M/S
TDI: 0.11 M/S
TR MAX PG: 25 MMHG
TV REST PULMONARY ARTERY PRESSURE: 28 MMHG
Z-SCORE OF LEFT VENTRICULAR DIMENSION IN END DIASTOLE: -0.54
Z-SCORE OF LEFT VENTRICULAR DIMENSION IN END SYSTOLE: 1.6

## 2024-05-28 PROCEDURE — 93306 TTE W/DOPPLER COMPLETE: CPT

## 2024-06-04 DIAGNOSIS — Z95.2 S/P MVR (MITRAL VALVE REPLACEMENT): ICD-10-CM

## 2024-06-04 RX ORDER — WARFARIN 4 MG/1
TABLET ORAL
Qty: 90 TABLET | Refills: 3 | Status: SHIPPED | OUTPATIENT
Start: 2024-06-04

## 2024-06-11 ENCOUNTER — ANTI-COAG VISIT (OUTPATIENT)
Dept: CARDIOLOGY | Facility: HOSPITAL | Age: 54
End: 2024-06-11
Payer: MEDICAID

## 2024-06-11 DIAGNOSIS — Z79.01 ANTICOAGULATION MONITORING, INR RANGE 2.5-3.5: ICD-10-CM

## 2024-06-11 DIAGNOSIS — Z95.2 HISTORY OF MITRAL VALVE REPLACEMENT WITH MECHANICAL VALVE: Primary | ICD-10-CM

## 2024-06-11 LAB
CTP QC/QA: YES
INR PPP: 3.5 (ref 2–3)
PROTHROMBIN TIME, POC: ABNORMAL (ref 2–3)

## 2024-06-11 PROCEDURE — 85610 PROTHROMBIN TIME: CPT

## 2024-06-11 PROCEDURE — 99211 OFF/OP EST MAY X REQ PHY/QHP: CPT

## 2024-06-19 ENCOUNTER — OFFICE VISIT (OUTPATIENT)
Dept: CARDIOLOGY | Facility: CLINIC | Age: 54
End: 2024-06-19
Payer: MEDICAID

## 2024-06-19 VITALS
SYSTOLIC BLOOD PRESSURE: 140 MMHG | HEART RATE: 78 BPM | BODY MASS INDEX: 25.36 KG/M2 | TEMPERATURE: 99 F | DIASTOLIC BLOOD PRESSURE: 92 MMHG | OXYGEN SATURATION: 99 % | WEIGHT: 129.19 LBS | HEIGHT: 60 IN | RESPIRATION RATE: 20 BRPM

## 2024-06-19 DIAGNOSIS — I10 HYPERTENSION, UNSPECIFIED TYPE: ICD-10-CM

## 2024-06-19 DIAGNOSIS — I38 ENDOCARDITIS, UNSPECIFIED CHRONICITY, UNSPECIFIED ENDOCARDITIS TYPE: Primary | ICD-10-CM

## 2024-06-19 DIAGNOSIS — Z95.2 S/P MVR (MITRAL VALVE REPLACEMENT): ICD-10-CM

## 2024-06-19 DIAGNOSIS — I48.19 PERSISTENT ATRIAL FIBRILLATION: ICD-10-CM

## 2024-06-19 DIAGNOSIS — I42.8 NON-ISCHEMIC CARDIOMYOPATHY: ICD-10-CM

## 2024-06-19 LAB
OHS QRS DURATION: 100 MS
OHS QTC CALCULATION: 435 MS

## 2024-06-19 PROCEDURE — 99213 OFFICE O/P EST LOW 20 MIN: CPT | Mod: PBBFAC | Performed by: INTERNAL MEDICINE

## 2024-06-19 PROCEDURE — 93005 ELECTROCARDIOGRAM TRACING: CPT

## 2024-06-19 NOTE — PROGRESS NOTES
[facility]  Outpatient Cardiology Clinic    Chief Complaint: Follow-up (Echo results) and Denies Complaints                                 HPI:  Flako Power 53 y.o. male with history of mitral valve endocarditis status post mechanical MVR in February 2023 with Medtronic ats Number 31 valve (and left atrial appendage ligation) and atrial fibrillation.  At the time EF was 60% and he had clean coronaries.  In May 2023 an echocardiogram revealed EF of 36%. He was placed on Entresto 49/51 mg bid as well as Toprol 25 mg bid. Repeat echo showed EF 47%, normally functioning mechanical mitral valve.    Today feeling well with no cardiovascular complaints whatsoever. No exertional chest pain, shortness of breath, fatigue.  Patient also denies orthopnea, PND, lower extremity edema, palpitations, dizziness, lightheadedness or syncope. Patient is compliant with Coumadin and has no bleeding.  Last INR 3.5                                                                                                                                                                                                                                                                                                                                                                                                                                                                             CARDIAC TESTING:  Results for orders placed during the hospital encounter of 05/28/24    Echo    Interpretation Summary    Left Ventricle: The left ventricle is normal in size. Normal wall thickness. The visually estimated ejection fraction is 45 - 50%. Biplane (2D) method of discs ejection fraction is 47%.    Right Ventricle: Normal right ventricular cavity size. Systolic function is borderline low.    Left Atrium: Left atrium is moderately dilated.    Right Atrium: Right atrium is mildly dilated.    Aortic Valve: The aortic valve is a trileaflet valve. There is no stenosis.  Aortic valve peak velocity is 1.32 m/s. Mean gradient is 4 mmHg.    Mitral Valve: There is a mechanical valve in the mitral position. The mean pressure gradient across the mitral valve is 4 mmHg at a heart rate of 80 bpm.    Tricuspid Valve: There is mild to moderate regurgitation.    Pulmonic Valve: There is mild regurgitation.    Pulmonary Artery: The estimated pulmonary artery systolic pressure is 28 mmHg.    IVC/SVC: Normal venous pressure at 3 mmHg.      Echo in 5/2023:  The left ventricle is normal in size with eccentric hypertrophy and moderately decreased systolic function.  The estimated ejection fraction is 36%.  Grade I left ventricular diastolic dysfunction.  Mild right ventricular enlargement with mildly reduced right ventricular systolic function.  Mild right atrial enlargement.  Mild pulmonic regurgitation.  There is a mechanical mitral valve prosthesis.  Mild tricuspid regurgitation.  Normal central venous pressure (3 mmHg).  The estimated PA systolic pressure is 25 mmHg.  There is no pulmonary hypertension.  Mild left atrial enlargement.       Results for orders placed during the hospital encounter of 01/16/23    Cardiac catheterization    Conclusion    There was no obstructive coronary artery disease.    Assessment/Plan:  - No obstructive coronary disease. Mgmt of infective endocarditis as per primary team.    Speedy Rendon MD  Interventional Cardiology/Structural Heart Disease  Cardiovascular [facility]       Patient Active Problem List   Diagnosis    Severe mitral valve regurgitation    Bacteremia    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    Endocarditis    S/P MVR (mitral valve replacement)    Paroxysmal tachycardia    Combined systolic and diastolic heart failure     Past Surgical History:   Procedure Laterality Date    ABDOMINAL SURGERY      LEFT HEART CATHETERIZATION N/A 01/19/2023    Procedure: Left heart cath;  Surgeon: Speedy Rendon Jr., MD;   "Location: [facility] CATH LAB;  Service: Cardiology;  Laterality: N/A;  Diagnostic Procedure Only- Severe MR Awaiting Surgery.    MITRAL VALVE REPLACEMENT N/A 2/8/2023    Procedure: REPLACEMENT, MITRAL VALVE;  Surgeon: Jose Ramon Rodrigues MD;  Location: [facility] OR;  Service: Cardiology;  Laterality: N/A;  ECHO NOTIFIED    TONSILLECTOMY       Social History     Socioeconomic History    Marital status: Single   Tobacco Use    Smoking status: Never    Smokeless tobacco: Never   Substance and Sexual Activity    Alcohol use: Not Currently    Drug use: Never    Sexual activity: Not Currently   Social History Narrative    ** Merged History Encounter **             Family History   Problem Relation Name Age of Onset    Diabetes type I Mother      Breast cancer Mother      Hypertension Mother      No Known Problems Father       Review of patient's allergies indicates:  No Known Allergies      ROS:                                                                                                                                                                             Negative except as stated in the history of present illness. See HPI for details.    PHYSICAL EXAM:  Visit Vitals  BP (!) 135/99 (BP Location: Left arm, Patient Position: Sitting, BP Method: Large (Automatic))   Pulse 78   Temp 98.8 °F (37.1 °C) (Oral)   Resp 20   Ht 4' 9" (1.448 m)   Wt 58.6 kg (129 lb 3.2 oz)   SpO2 99%   BMI 27.96 kg/m²       General: alert and oriented/no acute distress  Eye: EOMI/normal conjunctiva/no xanthelasma  HENT: normocephalic/moist oral mucosa  Neck: supple/nontender/no carotid bruit  Respiratory: lungs CTA/nonlabored respirations/BS equal/symmetrical expansion/no  chest wall tenderness  Cardiovascular: normal rate/normal rhythm/no murmur/normal peripheral perfusion/no  edema/no JVD  Gastrointestinal: soft/nontender  Musculoskeletal: normal ROM  Integumentary: warm/dry/pink/intact  Neurologic: alert/oriented/normal sensory/no focal " deficits  Psychiatric: cooperative/appropriate mood and affect/normal judgment    Current Outpatient Medications   Medication Instructions    metoprolol succinate (TOPROL-XL) 25 mg, Oral, 2 times daily    sacubitriL-valsartan (ENTRESTO) 49-51 mg per tablet 1 tablet, Oral, 2 times daily    warfarin (COUMADIN) 4 MG tablet Take 2 tabs (8 mg) oral at bedtime        All medications, laboratory studies, cardiac diagnostic imaging independently reviewed.     Lab Results   Component Value Date    .00 06/05/2023    TRIG 71 06/05/2023    CREATININE 0.98 06/05/2023    MG 1.80 05/02/2023    K 3.8 06/05/2023        ASSESSMENT/PLAN:    History of mitral valve endocarditis  status post mechanical MVR in February 2023 with Medtronic ats Number 31 valve (and left atrial appendage ligation)  Normally functioning valve per echo on 5/28/2024  Continue coumadin 8mg. Continue INR monitoring at [facility]  Pt will need endocarditis prophylaxis for certain procedures    Nonischemic cardiomyopathy  Unclear etiology of drop in EF in May 2023, he developed new onset afib around that time and tachycardiac induced CM is a possibility.  EF recovered to 47% on repeat echo 5/28/2024 while on entresto 49/51mg bid and toprol 25mg bid  NYHA class I  Patient reluctant to add more medications as he feels well.  As below we will continue to monitor his blood pressure with the nurse visit and needed will start Aldactone      HTN  /99 with repeat 140/92  Pt reports he checks his BP daily at home and it is around 120s/70s  Will have him bring his log and machine and schedule a nurse visit  *Will add aldactone if needed     Atrial fibrillation   CHADsVASC 2  Patient is in atrial fibrillation today but is rate controlled in the 70s.  Continue metoprolol succinate 25 mg bid and Coumadin

## 2024-07-03 DIAGNOSIS — Z95.2 S/P MVR (MITRAL VALVE REPLACEMENT): ICD-10-CM

## 2024-07-03 DIAGNOSIS — I47.9 PAROXYSMAL TACHYCARDIA: ICD-10-CM

## 2024-07-03 DIAGNOSIS — I50.40 COMBINED SYSTOLIC AND DIASTOLIC HEART FAILURE, UNSPECIFIED HF CHRONICITY: ICD-10-CM

## 2024-07-03 RX ORDER — METOPROLOL SUCCINATE 25 MG/1
25 TABLET, EXTENDED RELEASE ORAL 2 TIMES DAILY
Qty: 180 TABLET | Refills: 3 | Status: SHIPPED | OUTPATIENT
Start: 2024-07-03 | End: 2025-07-03

## 2024-07-08 ENCOUNTER — CLINICAL SUPPORT (OUTPATIENT)
Dept: CARDIOLOGY | Facility: CLINIC | Age: 54
End: 2024-07-08
Payer: MEDICAID

## 2024-07-08 VITALS
OXYGEN SATURATION: 100 % | HEART RATE: 84 BPM | HEIGHT: 60 IN | DIASTOLIC BLOOD PRESSURE: 79 MMHG | TEMPERATURE: 98 F | RESPIRATION RATE: 20 BRPM | WEIGHT: 132.63 LBS | SYSTOLIC BLOOD PRESSURE: 115 MMHG | BODY MASS INDEX: 26.04 KG/M2

## 2024-07-08 DIAGNOSIS — I10 HYPERTENSION, UNSPECIFIED TYPE: Primary | ICD-10-CM

## 2024-07-08 PROCEDURE — 99213 OFFICE O/P EST LOW 20 MIN: CPT | Mod: PBBFAC

## 2024-07-08 NOTE — PATIENT INSTRUCTIONS
HERE FOR B/P CHECK 115/79 HR--84   DID HIS MONITOR /71 HR-67  TAKES MED DAILY DENIES ANY OTHER CARDIAC ISSUES   HAS BEEN DOING LOGS WHICH HE BROUGHT   INSTRUCTED TO CONTINUE MED PROTOCOL AND DOING   LOGS MAYBE EVERY OTHER DAY AND DO LOW SALT DIET  ED PRECAUTIONSGIVEN   VERBALIZED UNDERSTANDING

## 2024-07-09 ENCOUNTER — ANTI-COAG VISIT (OUTPATIENT)
Dept: CARDIOLOGY | Facility: HOSPITAL | Age: 54
End: 2024-07-09
Payer: MEDICAID

## 2024-07-09 DIAGNOSIS — Z79.01 ANTICOAGULATION MONITORING, INR RANGE 2.5-3.5: ICD-10-CM

## 2024-07-09 DIAGNOSIS — Z95.2 HISTORY OF MITRAL VALVE REPLACEMENT WITH MECHANICAL VALVE: Primary | ICD-10-CM

## 2024-07-09 LAB
CTP QC/QA: YES
INR PPP: 3.4 (ref 2–3)
PROTHROMBIN TIME, POC: ABNORMAL (ref 2–3)

## 2024-07-09 PROCEDURE — 99211 OFF/OP EST MAY X REQ PHY/QHP: CPT

## 2024-07-09 PROCEDURE — 85610 PROTHROMBIN TIME: CPT

## 2024-07-29 ENCOUNTER — LAB VISIT (OUTPATIENT)
Dept: LAB | Facility: HOSPITAL | Age: 54
End: 2024-07-29
Payer: MEDICAID

## 2024-07-29 ENCOUNTER — OFFICE VISIT (OUTPATIENT)
Dept: INTERNAL MEDICINE | Facility: CLINIC | Age: 54
End: 2024-07-29
Payer: MEDICAID

## 2024-07-29 VITALS
DIASTOLIC BLOOD PRESSURE: 80 MMHG | BODY MASS INDEX: 26.86 KG/M2 | RESPIRATION RATE: 18 BRPM | HEART RATE: 69 BPM | WEIGHT: 136.81 LBS | OXYGEN SATURATION: 100 % | HEIGHT: 60 IN | TEMPERATURE: 98 F | SYSTOLIC BLOOD PRESSURE: 135 MMHG

## 2024-07-29 DIAGNOSIS — I10 HYPERTENSION, UNSPECIFIED TYPE: ICD-10-CM

## 2024-07-29 DIAGNOSIS — Z12.11 ENCOUNTER FOR COLORECTAL CANCER SCREENING: Primary | ICD-10-CM

## 2024-07-29 DIAGNOSIS — Z12.12 ENCOUNTER FOR COLORECTAL CANCER SCREENING: Primary | ICD-10-CM

## 2024-07-29 DIAGNOSIS — I50.40 COMBINED SYSTOLIC AND DIASTOLIC HEART FAILURE, UNSPECIFIED HF CHRONICITY: ICD-10-CM

## 2024-07-29 LAB
ALBUMIN SERPL-MCNC: 4 G/DL (ref 3.5–5)
ALBUMIN/GLOB SERPL: 1.1 RATIO (ref 1.1–2)
ALP SERPL-CCNC: 64 UNIT/L (ref 40–150)
ALT SERPL-CCNC: 24 UNIT/L (ref 0–55)
ANION GAP SERPL CALC-SCNC: 7 MEQ/L
AST SERPL-CCNC: 23 UNIT/L (ref 5–34)
BASOPHILS # BLD AUTO: 0.03 X10(3)/MCL
BASOPHILS NFR BLD AUTO: 0.5 %
BILIRUB SERPL-MCNC: 0.6 MG/DL
BUN SERPL-MCNC: 14.6 MG/DL (ref 8.4–25.7)
CALCIUM SERPL-MCNC: 9.6 MG/DL (ref 8.4–10.2)
CHLORIDE SERPL-SCNC: 110 MMOL/L (ref 98–107)
CHOLEST SERPL-MCNC: 194 MG/DL
CHOLEST/HDLC SERPL: 4 {RATIO} (ref 0–5)
CO2 SERPL-SCNC: 26 MMOL/L (ref 22–29)
CREAT SERPL-MCNC: 1.2 MG/DL (ref 0.73–1.18)
CREAT/UREA NIT SERPL: 12
EOSINOPHIL # BLD AUTO: 0.08 X10(3)/MCL (ref 0–0.9)
EOSINOPHIL NFR BLD AUTO: 1.3 %
ERYTHROCYTE [DISTWIDTH] IN BLOOD BY AUTOMATED COUNT: 14.8 % (ref 11.5–17)
EST. AVERAGE GLUCOSE BLD GHB EST-MCNC: 111.2 MG/DL
GFR SERPLBLD CREATININE-BSD FMLA CKD-EPI: >60 ML/MIN/1.73/M2
GLOBULIN SER-MCNC: 3.5 GM/DL (ref 2.4–3.5)
GLUCOSE SERPL-MCNC: 89 MG/DL (ref 74–100)
HBA1C MFR BLD: 5.5 %
HCT VFR BLD AUTO: 41.8 % (ref 42–52)
HDLC SERPL-MCNC: 54 MG/DL (ref 35–60)
HGB BLD-MCNC: 14.4 G/DL (ref 14–18)
IMM GRANULOCYTES # BLD AUTO: 0.01 X10(3)/MCL (ref 0–0.04)
IMM GRANULOCYTES NFR BLD AUTO: 0.2 %
LDLC SERPL CALC-MCNC: 124 MG/DL (ref 50–140)
LYMPHOCYTES # BLD AUTO: 1.51 X10(3)/MCL (ref 0.6–4.6)
LYMPHOCYTES NFR BLD AUTO: 23.6 %
MCH RBC QN AUTO: 30 PG (ref 27–31)
MCHC RBC AUTO-ENTMCNC: 34.4 G/DL (ref 33–36)
MCV RBC AUTO: 87.1 FL (ref 80–94)
MONOCYTES # BLD AUTO: 0.43 X10(3)/MCL (ref 0.1–1.3)
MONOCYTES NFR BLD AUTO: 6.7 %
NEUTROPHILS # BLD AUTO: 4.34 X10(3)/MCL (ref 2.1–9.2)
NEUTROPHILS NFR BLD AUTO: 67.7 %
NRBC BLD AUTO-RTO: 0 %
PLATELET # BLD AUTO: 173 X10(3)/MCL (ref 130–400)
PMV BLD AUTO: 11.8 FL (ref 7.4–10.4)
POTASSIUM SERPL-SCNC: 4.1 MMOL/L (ref 3.5–5.1)
PROT SERPL-MCNC: 7.5 GM/DL (ref 6.4–8.3)
RBC # BLD AUTO: 4.8 X10(6)/MCL (ref 4.7–6.1)
SODIUM SERPL-SCNC: 143 MMOL/L (ref 136–145)
TRIGL SERPL-MCNC: 80 MG/DL (ref 34–140)
VLDLC SERPL CALC-MCNC: 16 MG/DL
WBC # BLD AUTO: 6.4 X10(3)/MCL (ref 4.5–11.5)

## 2024-07-29 PROCEDURE — 80061 LIPID PANEL: CPT

## 2024-07-29 PROCEDURE — 99214 OFFICE O/P EST MOD 30 MIN: CPT | Mod: PBBFAC,25

## 2024-07-29 PROCEDURE — 90715 TDAP VACCINE 7 YRS/> IM: CPT | Mod: PBBFAC

## 2024-07-29 PROCEDURE — 83036 HEMOGLOBIN GLYCOSYLATED A1C: CPT

## 2024-07-29 PROCEDURE — 80053 COMPREHEN METABOLIC PANEL: CPT

## 2024-07-29 PROCEDURE — 90471 IMMUNIZATION ADMIN: CPT | Mod: PBBFAC

## 2024-07-29 PROCEDURE — 36415 COLL VENOUS BLD VENIPUNCTURE: CPT

## 2024-07-29 PROCEDURE — 85025 COMPLETE CBC W/AUTO DIFF WBC: CPT

## 2024-07-29 RX ADMIN — TETANUS TOXOID, REDUCED DIPHTHERIA TOXOID AND ACELLULAR PERTUSSIS VACCINE, ADSORBED 0.5 ML: 5; 2.5; 8; 8; 2.5 SUSPENSION INTRAMUSCULAR at 08:07

## 2024-07-29 NOTE — PROGRESS NOTES
[facility] INTERNAL MEDICINE  Post-wards visit     SUBJECTIVE:      Chief Complaint: Follow-up     HPI: Flako Power is a 53 y.o. yo male w/ PMH of HTN and endocarditis s/p MVR 2/8/2023 who presents for follow-up.  Patient doing well today, states he occasionally experiences chest tightness and dizziness with lifting heavy objects (works as a ) but overall has been doing well.  He denies any recent episodes of chest pain, sob, palpitations, orthopnea, LE swelling, PND, or diaphoresis. He endorses good compliance with medication regimen and denies any alcohol, tobacco, or recreational drug use.  Following with cardiology, last seen 6/19/2024.    Also following in Coumadin clinic at [facility].      Past Medical History:   has a past medical history of Cough, Hypertension, Infective endocarditis, Mitral incompetence, and PICC (peripherally inserted central catheter) in place.     Past Surgical History:   has a past surgical history that includes Left heart catheterization (N/A, 01/19/2023); Abdominal surgery; Tonsillectomy; and Mitral valve replacement (N/A, 2/8/2023).     Family History:  family history includes Breast cancer in his mother; Diabetes type I in his mother; Hypertension in his mother; No Known Problems in his father.     Social History:   reports that he has never smoked. He has never used smokeless tobacco. He reports that he does not currently use alcohol. He reports that he does not use drugs.     Allergies:  has No Known Allergies.     Home Medications:  Prior to Admission medications    Medication Sig Start Date End Date Taking? Authorizing Provider   amLODIPine (NORVASC) 5 MG tablet Take 1 tablet (5 mg total) by mouth once daily. 1/26/23 1/26/24 Yes Alexia Alvarez,    warfarin (COUMADIN) 4 MG tablet Take 2 tabs (8 mg) oral at bedtime 4/17/23  Yes Magali Barboza MD   metoprolol tartrate (LOPRESSOR) 25 MG tablet Take 0.5 tablets (12.5 mg total) by mouth 2 (two) times daily. 5/2/23 5/8/23  "Yes Tung Cosby, DO   baclofen (LIORESAL) 10 MG tablet Take 1 tablet (10 mg total) by mouth 3 (three) times daily as needed (muscle spasms). 12/20/22 12/27/22  Garfield Echeverria Jr., FNP   diclofenac (VOLTAREN) 75 MG EC tablet Take 1 tablet (75 mg total) by mouth 2 (two) times daily.  Patient not taking: Reported on 5/8/2023 12/16/22   Mandeep Qureshi MD   metoprolol succinate (TOPROL-XL) 25 MG 24 hr tablet Take 1 tablet (25 mg total) by mouth once daily. 5/8/23 5/7/24  Cheyenne Jean MD   mupirocin (BACTROBAN) 2 % ointment by Nasal route once daily. Apply to inside of both nostrils the night before surgery and the morning of surgery.  Patient not taking: Reported on 5/8/2023 1/30/23   Jose Ramon Rodrigues MD   sacubitriL-valsartan (ENTRESTO) 24-26 mg per tablet Take 1 tablet by mouth 2 (two) times daily. 5/8/23   Cheyenne Jean MD     Review of Systems   Constitutional:  Negative for chills and fever.   Eyes:  Negative for blurred vision and double vision.   Respiratory:  Negative for cough and hemoptysis.    Cardiovascular:  Negative for chest pain, palpitations, orthopnea, claudication and leg swelling.        Endorses chest tightness associated with lifting heavy objects    Gastrointestinal:  Negative for abdominal pain, blood in stool, constipation, diarrhea, heartburn, melena, nausea and vomiting.   Genitourinary:  Negative for dysuria.   Musculoskeletal:  Negative for myalgias and neck pain.   Neurological:  Negative for dizziness and headaches.         OBJECTIVE:     Vital signs:   /80 (BP Location: Left arm, Patient Position: Sitting, BP Method: Medium (Automatic))   Pulse 69   Temp 98.2 °F (36.8 °C) (Oral)   Resp 18   Ht 4' 9" (1.448 m)   Wt 62.1 kg (136 lb 12.8 oz)   SpO2 100%   BMI 29.60 kg/m²      Physical Examination:  General: Patient resting comfortably, in no acute distress   Eye: PERRLA, EOMI, clear conjunctiva, eyelids normal  HENT: Head-normocephalic and atraumatic  Neck: full range of " motion, no thyromegaly or lymphadenopathy, trachea midline, supple, no palpable thyroid nodules  Respiratory: clear to auscultation bilaterally without wheezes, rales, rhonchi  Cardiovascular: irregular rhythm w/ normal rate, no murmer's noted. No gallops or rubs no JVD.  Capillary refill within normal limits.  Gastrointestinal: soft, non-tender, non-distended with normal bowel sounds, without masses to palpation  Genitourinary: no CVA tenderness to palpation  Musculoskeletal: full range of motion of all extremities/spine without limitation or discomfort  Integumentary: no rashes or skin lesions present  Neurologic: no signs of peripheral neurological deficit, motor/sensory function intact  Psychiatric:  alert and oriented, cognitive function intact, cooperative with exam, good eye contact, judgement and insight intact, mood and affect full range.     Labs:  CMP:   Lab Results   Component Value Date    GLUCOSE 89 07/29/2024    CALCIUM 9.6 07/29/2024    ALBUMIN 4.0 07/29/2024     07/29/2024    K 4.1 07/29/2024    CO2 26 07/29/2024     (H) 07/29/2024    BUN 14.6 07/29/2024    CREATININE 1.20 (H) 07/29/2024    ALKPHOS 64 07/29/2024    ALT 24 07/29/2024    AST 23 07/29/2024    BILITOT 0.6 07/29/2024      CBC:   Lab Results   Component Value Date    WBC 6.40 07/29/2024    HGB 14.4 07/29/2024    HCT 41.8 (L) 07/29/2024    MCV 87.1 07/29/2024    RDW 14.8 07/29/2024     Coagulation:   Lab Results   Component Value Date    INR 3.4 (A) 07/09/2024    APTT 30.5 02/08/2023     DM:   Lab Results   Component Value Date    HGBA1C 5.5 07/29/2024    .2 07/29/2024    CREATININE 1.20 (H) 07/29/2024     Thyroid:   Lab Results   Component Value Date    TSH 1.463 05/02/2023     LFTs:   Lab Results   Component Value Date    LABPROT 7.5 07/29/2024    ALBUMIN 4.0 07/29/2024    AST 23 07/29/2024    ALT 24 07/29/2024    ALKPHOS 64 07/29/2024       ASSESSMENT & PLAN:     HFrEF    Atrial fibrillation, rate controlled   Hx of  Endocarditis s/p MVR - treated x6 weeks w/ IV antibiotics in January 2023   Hypertension   -echo performed 5/2/2023 showing EF 36% with grade 1 diastolic dysfunction.  Repeat echo 5/2024 showing improvement in EF to 45-40%.  -LHC performed 1/19/2023 showing non obstructive CAD   -continue Toprol-XL 25mg and Entresto 49-51mg.   -continue coumadin for anticoagulation, following with coumadin clinic at [facility]. Goal INR 2.5-3.5, last INR 7/9/2024 within goal.    -given strict ED precautions to return if symptoms worsen or he has any new episodes of severe palpitations, chest pain, sob, or LE swelling.   -continue to follow with cardiology, next appt 8/6/2024.  Discussions at at last visit regarding initiating Aldactone.  Patients blood pressure at goal today at 135/80, still not interested in adding medication.  Discuss at cardiology visit.     Hyperlipidemia   -goal LDL <100, last lipid panel with   -ASCVD risk score 11.4% with recommendation for moderate-to-high intensity statin   -patient previously attempting lifestyle modifications and was not interested in medication therapy.  LDL remains elevated at 124 today which is above goal.  Informed patient he would likely benefit from further statin therapy.  Informed to discuss with cardiology.       Cologuard ordered previously x3 with inadequate sampling. Referral sent for colonoscopy with Dr. Rao at previous visit but patient is not interested in traveling to [place].  Will attempt to send new referral today to [facility].    TDAP vaccine administered today   Will discuss other vaccines at next visit   Patient with no history of tobacco abuse   Labs today    Cheyenne Jean MD  [facility] Internal Medicine, [facility]

## 2024-07-29 NOTE — PROGRESS NOTES
I have reviewed the notes, assessments, and management plan performed by resident, I concur with her documentation of Flako Power.  Date of Service: 7/29/2024

## 2024-08-06 ENCOUNTER — ANTI-COAG VISIT (OUTPATIENT)
Dept: CARDIOLOGY | Facility: HOSPITAL | Age: 54
End: 2024-08-06
Payer: MEDICAID

## 2024-08-06 DIAGNOSIS — Z95.2 HISTORY OF MITRAL VALVE REPLACEMENT WITH MECHANICAL VALVE: Primary | ICD-10-CM

## 2024-08-06 DIAGNOSIS — Z79.01 ANTICOAGULATION MONITORING, INR RANGE 2.5-3.5: ICD-10-CM

## 2024-08-06 LAB
CTP QC/QA: YES
INR PPP: 3.5 (ref 2–3)
PROTHROMBIN TIME, POC: ABNORMAL (ref 2–3)

## 2024-08-06 PROCEDURE — 85610 PROTHROMBIN TIME: CPT

## 2024-08-06 PROCEDURE — 99211 OFF/OP EST MAY X REQ PHY/QHP: CPT

## 2024-09-10 ENCOUNTER — ANTI-COAG VISIT (OUTPATIENT)
Dept: CARDIOLOGY | Facility: HOSPITAL | Age: 54
End: 2024-09-10
Payer: MEDICAID

## 2024-09-10 DIAGNOSIS — Z79.01 ANTICOAGULATION MONITORING, INR RANGE 2.5-3.5: ICD-10-CM

## 2024-09-10 DIAGNOSIS — Z95.2 HISTORY OF MITRAL VALVE REPLACEMENT WITH MECHANICAL VALVE: ICD-10-CM

## 2024-09-10 DIAGNOSIS — I48.19 PERSISTENT ATRIAL FIBRILLATION: ICD-10-CM

## 2024-09-10 LAB
CTP QC/QA: YES
INR PPP: 3.2 (ref 2–3)
PROTHROMBIN TIME, POC: ABNORMAL (ref 2–3)

## 2024-09-10 PROCEDURE — 99211 OFF/OP EST MAY X REQ PHY/QHP: CPT

## 2024-09-10 PROCEDURE — 85610 PROTHROMBIN TIME: CPT

## 2024-09-25 DIAGNOSIS — I47.9 PAROXYSMAL TACHYCARDIA: ICD-10-CM

## 2024-09-25 DIAGNOSIS — Z95.2 S/P MVR (MITRAL VALVE REPLACEMENT): ICD-10-CM

## 2024-09-25 DIAGNOSIS — I50.42 CHRONIC COMBINED SYSTOLIC AND DIASTOLIC HEART FAILURE: ICD-10-CM

## 2024-09-25 DIAGNOSIS — I50.40 COMBINED SYSTOLIC AND DIASTOLIC HEART FAILURE, UNSPECIFIED HF CHRONICITY: ICD-10-CM

## 2024-09-28 RX ORDER — METOPROLOL SUCCINATE 25 MG/1
25 TABLET, EXTENDED RELEASE ORAL 2 TIMES DAILY
Qty: 180 TABLET | Refills: 3 | Status: SHIPPED | OUTPATIENT
Start: 2024-09-28 | End: 2025-09-28

## 2024-09-28 RX ORDER — SACUBITRIL AND VALSARTAN 49; 51 MG/1; MG/1
1 TABLET, FILM COATED ORAL 2 TIMES DAILY
Qty: 180 TABLET | Refills: 3 | Status: SHIPPED | OUTPATIENT
Start: 2024-09-28

## 2024-10-08 ENCOUNTER — ANTI-COAG VISIT (OUTPATIENT)
Dept: CARDIOLOGY | Facility: HOSPITAL | Age: 54
End: 2024-10-08
Payer: MEDICAID

## 2024-10-08 DIAGNOSIS — Z79.01 ANTICOAGULATION MONITORING, INR RANGE 2.5-3.5: ICD-10-CM

## 2024-10-08 DIAGNOSIS — Z95.2 HISTORY OF MITRAL VALVE REPLACEMENT WITH MECHANICAL VALVE: Primary | ICD-10-CM

## 2024-10-08 LAB
CTP QC/QA: YES
INR PPP: 2.7 (ref 2–3)
PROTHROMBIN TIME, POC: NORMAL (ref 2–3)

## 2024-10-08 PROCEDURE — 85610 PROTHROMBIN TIME: CPT

## 2024-10-08 PROCEDURE — 99211 OFF/OP EST MAY X REQ PHY/QHP: CPT

## 2024-10-24 DIAGNOSIS — Z95.2 S/P MVR (MITRAL VALVE REPLACEMENT): ICD-10-CM

## 2024-10-24 DIAGNOSIS — I47.9 PAROXYSMAL TACHYCARDIA: ICD-10-CM

## 2024-10-24 DIAGNOSIS — I50.42 CHRONIC COMBINED SYSTOLIC AND DIASTOLIC HEART FAILURE: ICD-10-CM

## 2024-10-24 RX ORDER — SACUBITRIL AND VALSARTAN 49; 51 MG/1; MG/1
1 TABLET, FILM COATED ORAL 2 TIMES DAILY
Qty: 180 TABLET | Refills: 3 | Status: SHIPPED | OUTPATIENT
Start: 2024-10-24

## 2024-11-05 ENCOUNTER — ANTI-COAG VISIT (OUTPATIENT)
Dept: CARDIOLOGY | Facility: HOSPITAL | Age: 54
End: 2024-11-05
Payer: MEDICAID

## 2024-11-05 DIAGNOSIS — I48.19 PERSISTENT ATRIAL FIBRILLATION: ICD-10-CM

## 2024-11-05 DIAGNOSIS — Z95.2 HISTORY OF MITRAL VALVE REPLACEMENT WITH MECHANICAL VALVE: ICD-10-CM

## 2024-11-05 DIAGNOSIS — Z79.01 ANTICOAGULATION MONITORING, INR RANGE 2.5-3.5: Primary | ICD-10-CM

## 2024-11-05 LAB
CTP QC/QA: YES
INR PPP: 3 (ref 2–3)
PROTHROMBIN TIME, POC: NORMAL (ref 2–3)

## 2024-11-05 PROCEDURE — 99211 OFF/OP EST MAY X REQ PHY/QHP: CPT

## 2024-11-05 PROCEDURE — 85610 PROTHROMBIN TIME: CPT

## 2024-11-06 NOTE — PROGRESS NOTES
[facility] - 8th Floor Med Surg  Cardiology  Progress Note    Patient Name: Flako Power  MRN: [id number]  Admission Date: 1/16/2023  Hospital Length of Stay: 2 days  Code Status: Full Code   Attending Physician: Cash Martinez MD   Primary Care Physician: Primary Doctor No  Expected Discharge Date:   Principal Problem:<principal problem not specified>    Subjective:   Chief Complaint:  Reason for consult: LAXMI      HPI:   Mr. Power is a 52 year old male who is unknown to [facility]. He presents to [facility] ED on 1.16.23 with complaints of back pain that began mid December 2022 and left knee pain. He reports that the pain beacme progressively worse that began to radiate bilaterally to his abdomen with an accompanying nonproductive cough. He denies CP, SOB, palps, fever, or chills. On arrival to the ER, he was found to be febrile (102.7 F), tachycardic, & hypertensive. Sginificant labs include WBC 12.3, ESR 63, CRP 89. CTA chest demonstrated an irregularity of T8 inferior endplate that may relate to degenerative changes but cannot exclude discitis/osteomyelitis. His initial blood cultures are positive. [facility] has been consulted to perform a LAXMI to further evaluate for possible MV endocarditis.     Hospital Course:  1.18.23: NAD Noted. Vitals Stable. LAXMI Performed with noted MV Endocarditis and Severe MR.     PMH: Denies  PSH: Denies  Family History: Noncontributory   Social History: Denies alcohol, tobacco, or illicit drug use.      Previous Cardiac Diagnostics:   Echocardiogram (1.17.23):  Concentric hypertrophy and normal systolic function.  The estimated ejection fraction is 61%.  Normal left ventricular diastolic function.  Normal right ventricular size.  Mild left atrial enlargement.  Severe mitral regurgitation.  There is anterior mitral leaflet vegetation.  Moderate tricuspid regurgitation.  Elevated central venous pressure (15 mmHg).  The estimated PA systolic pressure is 49 mmHg.  There is pulmonary  hypertension.    Review of Systems   Cardiovascular:  Negative for chest pain and dyspnea on exertion.   Respiratory:  Negative for shortness of breath.    All other systems reviewed and are negative.    Objective:     Vital Signs (Most Recent):  Temp: 98.6 °F (37 °C) (01/18/23 1015)  Pulse: 82 (01/18/23 0758)  Resp: 14 (01/18/23 1015)  BP: 104/66 (01/18/23 1015)  SpO2: 99 % (01/18/23 1015) Vital Signs (24h Range):  Temp:  [98.4 °F (36.9 °C)-98.8 °F (37.1 °C)] 98.6 °F (37 °C)  Pulse:  [72-96] 75  Resp:  [0-25] 14  SpO2:  [92 %-100 %] 99 %  BP: (104-153)/() 104/66     Weight: 57.6 kg (127 lb)  Body mass index is 25.65 kg/m².    SpO2: 99 %         Intake/Output Summary (Last 24 hours) at 1/18/2023 1057  Last data filed at 1/18/2023 1011  Gross per 24 hour   Intake 1200 ml   Output 900 ml   Net 300 ml       Lines/Drains/Airways       Peripheral Intravenous Line  Duration                  Peripheral IV - Single Lumen 01/16/23 2330 Posterior;Right Wrist 1 day                    Significant Labs:   Recent Results (from the past 72 hour(s))   COVID/FLU A&B PCR    Collection Time: 01/16/23 11:46 AM   Result Value Ref Range    Influenza A PCR Not Detected Not Detected    Influenza B PCR Not Detected Not Detected    SARS-CoV-2 PCR Not Detected Not Detected, Negative, Invalid   Urinalysis, Reflex to Urine Culture Urine, Clean Catch    Collection Time: 01/16/23  1:11 PM    Specimen: Urine   Result Value Ref Range    Color, UA Yellow Yellow, Light-Yellow, Dark Yellow, Ashlie, Straw    Appearance, UA Clear Clear    Specific Gravity, UA 1.018     pH, UA 5.5 5.0 - 8.5    Protein, UA Trace (A) Negative mg/dL    Glucose, UA Normal Negative, Normal mg/dL    Ketones, UA 1+ (A) Negative mg/dL    Blood, UA Trace (A) Negative unit/L    Bilirubin, UA Negative Negative mg/dL    Urobilinogen, UA Normal 0.2, 1.0, Normal mg/dL    Nitrites, UA Negative Negative    Leukocyte Esterase, UA Negative Negative unit/L    WBC, UA 0-5 None Seen,  0-2, 3-5, 0-5 /HPF    Bacteria, UA None Seen None Seen /HPF    Squamous Epithelial Cells, UA None Seen None Seen /HPF    Mucous, UA Trace (A) None Seen /LPF    Hyaline Casts, UA None Seen None Seen /lpf    RBC, UA 0-5 None Seen, 0-2, 3-5, 0-5 /HPF   Comprehensive metabolic panel    Collection Time: 01/16/23  1:15 PM   Result Value Ref Range    Sodium Level 138 136 - 145 mmol/L    Potassium Level 4.2 3.5 - 5.1 mmol/L    Chloride 102 98 - 107 mmol/L    Carbon Dioxide 26 22 - 29 mmol/L    Glucose Level 100 74 - 100 mg/dL    Blood Urea Nitrogen 11.8 8.4 - 25.7 mg/dL    Creatinine 1.19 (H) 0.73 - 1.18 mg/dL    Calcium Level Total 9.6 8.4 - 10.2 mg/dL    Protein Total 8.5 (H) 6.4 - 8.3 gm/dL    Albumin Level 3.1 (L) 3.5 - 5.0 g/dL    Globulin 5.4 (H) 2.4 - 3.5 gm/dL    Albumin/Globulin Ratio 0.6 (L) 1.1 - 2.0 ratio    Bilirubin Total 0.6 <=1.5 mg/dL    Alkaline Phosphatase 85 40 - 150 unit/L    Alanine Aminotransferase 31 0 - 55 unit/L    Aspartate Aminotransferase 25 5 - 34 unit/L    eGFR >60 mls/min/1.73/m2   Lactic acid, plasma    Collection Time: 01/16/23  1:15 PM   Result Value Ref Range    Lactic Acid Level 1.3 0.5 - 2.2 mmol/L   CBC with Differential    Collection Time: 01/16/23  1:15 PM   Result Value Ref Range    WBC 12.3 (H) 4.5 - 11.5 x10(3)/mcL    RBC 4.13 (L) 4.70 - 6.10 x10(6)/mcL    Hgb 11.3 (L) 14.0 - 18.0 gm/dL    Hct 34.4 (L) 42.0 - 52.0 %    MCV 83.3 80.0 - 94.0 fL    MCH 27.4 pg    MCHC 32.8 (L) 33.0 - 36.0 mg/dL    RDW 13.8 11.5 - 17.0 %    Platelet 242 130 - 400 x10(3)/mcL    MPV 9.9 7.4 - 10.4 fL    Neut % 85.8 %    Lymph % 7.3 %    Mono % 6.5 %    Eos % 0.0 %    Basophil % 0.2 %    Lymph # 0.90 0.6 - 4.6 x10(3)/mcL    Neut # 10.54 (H) 2.1 - 9.2 x10(3)/mcL    Mono # 0.80 0.1 - 1.3 x10(3)/mcL    Eos # 0.00 0 - 0.9 x10(3)/mcL    Baso # 0.03 0 - 0.2 x10(3)/mcL    IG# 0.03 0 - 0.04 x10(3)/mcL    IG% 0.2 %    NRBC% 0.0 %   Sedimentation Rate    Collection Time: 01/16/23  1:16 PM   Result Value Ref Range     Sed Rate 63 (H) 0 - 15 mm/hr   C-reactive protein    Collection Time: 01/16/23  1:16 PM   Result Value Ref Range    C-Reactive Protein 89.80 (H) <5.00 mg/L   Blood Culture #1 **CANNOT BE ORDERED STAT**    Collection Time: 01/16/23  2:52 PM    Specimen: Arm, Right; Blood   Result Value Ref Range    CULTURE, BLOOD ([facility]) Streptococcus sanguinis (A)     GRAM STAIN Gram Positive Cocci, probable Streptococcus (AA)     GRAM STAIN Seen in gram stain of broth only (AA)     GRAM STAIN 2 of 2 bottles positive (AA)    Blood Culture #1 **CANNOT BE ORDERED STAT**    Collection Time: 01/16/23  2:52 PM    Specimen: Hand, Right; Blood   Result Value Ref Range    CULTURE, BLOOD ([facility]) Streptococcus sanguinis (A)     GRAM STAIN Gram Positive Cocci, probable Streptococcus (AA)     GRAM STAIN Seen in gram stain of broth only (AA)     GRAM STAIN 2 of 2 bottles positive (AA)    APTT    Collection Time: 01/17/23  4:04 AM   Result Value Ref Range    PTT 33.3 23.2 - 33.7 seconds   Protime-INR    Collection Time: 01/17/23  4:04 AM   Result Value Ref Range    PT 15.0 (H) 12.5 - 14.5 seconds    INR 1.20 0.00 - 1.30   CBC Without Differential    Collection Time: 01/17/23  4:05 AM   Result Value Ref Range    WBC 7.9 4.5 - 11.5 x10(3)/mcL    RBC 4.18 (L) 4.70 - 6.10 x10(6)/mcL    Hgb 11.4 (L) 14.0 - 18.0 gm/dL    Hct 33.9 (L) 42.0 - 52.0 %    Platelet 238 130 - 400 x10(3)/mcL    MCV 81.1 80.0 - 94.0 fL    MCH 27.3 pg    MCHC 33.6 33.0 - 36.0 mg/dL    RDW 13.9 11.5 - 17.0 %    MPV 11.4 (H) 7.4 - 10.4 fL    NRBC% 0.0 %   Basic Metabolic Panel    Collection Time: 01/17/23  4:05 AM   Result Value Ref Range    Sodium Level 137 136 - 145 mmol/L    Potassium Level 3.9 3.5 - 5.1 mmol/L    Chloride 103 98 - 107 mmol/L    Carbon Dioxide 24 22 - 29 mmol/L    Glucose Level 89 74 - 100 mg/dL    Blood Urea Nitrogen 12.5 8.4 - 25.7 mg/dL    Creatinine 0.88 0.73 - 1.18 mg/dL    BUN/Creatinine Ratio 14     Calcium Level Total 9.1 8.4 - 10.2 mg/dL    Anion Gap  10.0 mEq/L    eGFR >60 mls/min/1.73/m2   Magnesium    Collection Time: 01/17/23  4:05 AM   Result Value Ref Range    Magnesium Level 2.00 1.60 - 2.60 mg/dL   Phosphorus    Collection Time: 01/17/23  4:05 AM   Result Value Ref Range    Phosphorus Level 3.4 2.3 - 4.7 mg/dL   Hepatitis Panel, Acute    Collection Time: 01/17/23  4:05 AM   Result Value Ref Range    Hepatitis A IgM Nonreactive Nonreactive    Hepatitis B Core IgM Nonreactive Nonreactive    Hepatitis B Surface Antigen Nonreactive Nonreactive    Hepatitis C Antibody Nonreactive Nonreactive   HIV 1/2 Ag/Ab (4th Gen)    Collection Time: 01/17/23  4:05 AM   Result Value Ref Range    HIV Nonreactive Nonreactive   SYPHILIS ANTIBODY (WITH REFLEX RPR)    Collection Time: 01/17/23  4:05 AM   Result Value Ref Range    Syphilis Antibody Nonreactive Nonreactive, Equivocal   Hemoglobin A1C    Collection Time: 01/17/23  4:05 AM   Result Value Ref Range    Hemoglobin A1c 5.6 <=7.0 %    Estimated Average Glucose 114.0 mg/dL   Drug Screen, Urine    Collection Time: 01/17/23  4:06 AM   Result Value Ref Range    Amphetamines, Urine Negative Negative    Barbituates, Urine Negative Negative    Benzodiazepine, Urine Negative Negative    Cannabinoids, Urine Negative Negative    Cocaine, Urine Negative Negative    Fentanyl, Urine Negative Negative    MDMA, Urine Negative Negative    Opiates, Urine Negative Negative    Phencyclidine, Urine Negative Negative    pH, Urine 5.5 3.0 - 11.0    Specific Gravity, Urine Auto 1.025 1.001 - 1.035   MRSA PCR    Collection Time: 01/17/23  4:06 AM   Result Value Ref Range    MRSA PCR SCRN ([facility]) Not Detected Not Detected   Echo    Collection Time: 01/17/23  9:37 AM   Result Value Ref Range    BSA 1.55 m2    TDI SEPTAL 0.12 m/s    LV LATERAL E/E' RATIO 7.79 m/s    LV SEPTAL E/E' RATIO 12.33 m/s    Right Atrial Pressure (from IVC) 15 mmHg    EF 61 %    Left Ventricular Outflow Tract Mean Velocity 0.89 cm/s    Left Ventricular Outflow Tract Mean  35 Gradient 4.00 mmHg    TDI LATERAL 0.19 m/s    PV PEAK VELOCITY 1.03 cm/s    LVIDd 5.60 3.5 - 6.0 cm    IVS 1.03 0.6 - 1.1 cm    Posterior Wall 1.38 (A) 0.6 - 1.1 cm    LVIDs 3.72 2.1 - 4.0 cm    FS 34 28 - 44 %    LV mass 282.07 g    LA size 4.10 cm    RVDD 2.06 cm    Left Ventricle Relative Wall Thickness 0.49 cm    AV mean gradient 15 mmHg    AV valve area 1.93 cm2    AV Velocity Ratio 0.53     AV index (prosthetic) 0.56     MV mean gradient 9 mmHg    MV valve area p 1/2 method 3.44 cm2    MV valve area by continuity eq 1.65 cm2    E/A ratio 1.17     Mean e' 0.16 m/s    E wave deceleration time 158.00 msec    LVOT diameter 2.10 cm    LVOT area 3.5 cm2    LVOT peak kyle 1.43 m/s    LVOT peak VTI 22.50 cm    Ao peak kyle 2.68 m/s    Ao VTI 40.4 cm    LVOT stroke volume 77.89 cm3    AV peak gradient 29 mmHg    MV peak gradient 22 mmHg    TV rest pulmonary artery pressure 49 mmHg    E/E' ratio 9.55 m/s    MV Peak E Kyle 1.48 m/s    TR Max Kyle 2.90 m/s    MV VTI 47.1 cm    MV stenosis pressure 1/2 time 64.00 ms    MV Peak A Kyle 1.26 m/s    LV Systolic Volume 58.90 mL    LV Systolic Volume Index 38.8 mL/m2    LV Diastolic Volume 154.00 mL    LV Diastolic Volume Index 101.32 mL/m2    LV Mass Index 186 g/m2    Triscuspid Valve Regurgitation Peak Gradient 34 mmHg    LA Volume Index (Mod) 85.5 mL/m2    LA volume (mod) 130.00 cm3   Chlamydia/GC, PCR    Collection Time: 01/17/23 10:02 AM    Specimen: Urine   Result Value Ref Range    Chlamydia trachomatis PCR Not Detected Not Detected    N. gonorrhea PCR Not Detected Not Detected   VANCOMYCIN, TROUGH    Collection Time: 01/18/23  3:56 AM   Result Value Ref Range    Vancomycin Trough 7.4 (L) 15.0 - 20.0 ug/ml   Basic Metabolic Panel    Collection Time: 01/18/23  3:56 AM   Result Value Ref Range    Sodium Level 137 136 - 145 mmol/L    Potassium Level 4.2 3.5 - 5.1 mmol/L    Chloride 104 98 - 107 mmol/L    Carbon Dioxide 24 22 - 29 mmol/L    Glucose Level 93 74 - 100 mg/dL    Blood  Urea Nitrogen 9.7 8.4 - 25.7 mg/dL    Creatinine 0.91 0.73 - 1.18 mg/dL    BUN/Creatinine Ratio 11     Calcium Level Total 8.6 8.4 - 10.2 mg/dL    Anion Gap 9.0 mEq/L    eGFR >60 mls/min/1.73/m2   CBC with Differential    Collection Time: 01/18/23  3:56 AM   Result Value Ref Range    WBC 7.1 4.5 - 11.5 x10(3)/mcL    RBC 3.98 (L) 4.70 - 6.10 x10(6)/mcL    Hgb 10.7 (L) 14.0 - 18.0 gm/dL    Hct 32.7 (L) 42.0 - 52.0 %    MCV 82.2 80.0 - 94.0 fL    MCH 26.9 pg    MCHC 32.7 (L) 33.0 - 36.0 mg/dL    RDW 13.6 11.5 - 17.0 %    Platelet 229 130 - 400 x10(3)/mcL    MPV 10.4 7.4 - 10.4 fL    Neut % 73.4 %    Lymph % 14.4 %    Mono % 9.5 %    Eos % 2.0 %    Basophil % 0.4 %    Lymph # 1.03 0.6 - 4.6 x10(3)/mcL    Neut # 5.23 2.1 - 9.2 x10(3)/mcL    Mono # 0.68 0.1 - 1.3 x10(3)/mcL    Eos # 0.14 0 - 0.9 x10(3)/mcL    Baso # 0.03 0 - 0.2 x10(3)/mcL    IG# 0.02 0 - 0.04 x10(3)/mcL    IG% 0.3 %    NRBC% 0.0 %   Transesophageal echo (LAXMI)    Collection Time: 01/18/23 10:18 AM   Result Value Ref Range    BSA 1.55 m2    Mr max lanre 5.82 m/s     Physical Exam  Vitals reviewed.   Constitutional:       General: He is not in acute distress.     Appearance: Normal appearance. He is not ill-appearing.   HENT:      Head: Normocephalic.      Mouth/Throat:      Mouth: Mucous membranes are moist.      Pharynx: Oropharynx is clear.   Cardiovascular:      Rate and Rhythm: Normal rate and regular rhythm.      Heart sounds: Murmur heard.   Pulmonary:      Effort: Pulmonary effort is normal. No respiratory distress.      Breath sounds: Normal breath sounds.      Comments: On Room Air  Abdominal:      General: There is no distension.      Palpations: Abdomen is soft.      Tenderness: There is no abdominal tenderness.   Musculoskeletal:         General: Normal range of motion.      Cervical back: Neck supple.      Right lower leg: No edema.      Left lower leg: No edema.   Skin:     General: Skin is warm and dry.   Neurological:      General: No focal  deficit present.      Mental Status: He is alert and oriented to person, place, and time. Mental status is at baseline.   Psychiatric:         Mood and Affect: Mood normal.         Behavior: Behavior normal.       Current Inpatient Medications:    Current Facility-Administered Medications:     acetaminophen tablet 1,000 mg, 1,000 mg, Oral, Q6H PRN, Ovidio Trivedi MD    acetaminophen tablet 650 mg, 650 mg, Oral, Q4H PRN, Ovidio Trviedi MD    aluminum-magnesium hydroxide-simethicone 200-200-20 mg/5 mL suspension 30 mL, 30 mL, Oral, QID PRN, Ovidio Trivedi MD    ceFEPIme (MAXIPIME) 2 g in dextrose 5 % in water (D5W) 5 % 50 mL IVPB (MB+), 2 g, Intravenous, Q8H, Ovidio Trivedi MD, Last Rate: 100 mL/hr at 01/18/23 1045, 2 g at 01/18/23 1045    HYDROcodone-acetaminophen 5-325 mg per tablet 1 tablet, 1 tablet, Oral, Q6H PRN, Ovidio Trivedi MD    lactated ringers infusion, , Intravenous, Continuous, Cash Martinez MD, Last Rate: 75 mL/hr at 01/17/23 1548, Rate Change at 01/17/23 1548    melatonin tablet 6 mg, 6 mg, Oral, Nightly PRN, Ovidio Trivedi MD    methocarbamoL tablet 500 mg, 500 mg, Oral, QID PRN, Ovidio Trivedi MD, 500 mg at 01/17/23 0035    morphine injection 2 mg, 2 mg, Intravenous, Q4H PRN, Ovidio Trivedi MD    ondansetron injection 4 mg, 4 mg, Intravenous, Q4H PRN, Ovidio Trivedi MD, 4 mg at 01/17/23 0035    polyethylene glycol packet 17 g, 17 g, Oral, BID PRN, Ovidio Trivedi MD    prochlorperazine injection Soln 5 mg, 5 mg, Intravenous, Q6H PRN, Ovidio Trivedi MD    senna-docusate 8.6-50 mg per tablet 2 tablet, 2 tablet, Oral, BID PRN, Ovidio Trivedi MD    simethicone chewable tablet 80 mg, 1 tablet, Oral, QID PRN, Ovidio Trivedi MD    sodium chloride 0.9% flush 10 mL, 10 mL, Intravenous, PRN, Ovidio Trivedi MD    Pharmacy to dose Vancomycin consult, , , Once **AND** vancomycin - pharmacy to dose, , Intravenous, pharmacy to manage frequency, Ovidio Trivedi MD    vancomycin 750 mg in dextrose 5 % 250 mL IVPB (ready to mix system), 750  mg, Intravenous, Q12H, Ovidio Trivedi MD, Stopped at 01/17/23 1648    vancomycin in dextrose 5 % 1 gram/250 mL IVPB 1,000 mg, 1,000 mg, Intravenous, Q12H, Cash Martinez MD, Stopped at 01/18/23 0840    VTE Risk Mitigation (From admission, onward)           Ordered     IP VTE LOW RISK PATIENT  Once         01/16/23 2344     Place sequential compression device  Until discontinued         01/16/23 2344                  Assessment:   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 NPO Past Midnight. Can eat today from [facility] Perspective.  Risks/Benefits/Alternatives of Coronary Angiogram Discussed with the patient. He Elects to Proceed.    Claudio Cardona, JOSE  Cardiology  [facility] - 8th Floor Med Surg  01/18/2023     I have seen the patient, reviewed the Nurse Practitioner's note, assessment and plan. I have personally interviewed and examined the patient at bedside and agree with the findings. Medical decision making listed above were done under my guidance.

## 2024-12-04 DIAGNOSIS — Z12.11 ENCOUNTER FOR SCREENING COLONOSCOPY: Primary | ICD-10-CM

## 2024-12-04 RX ORDER — POLYETHYLENE GLYCOL 3350, SODIUM SULFATE, SODIUM CHLORIDE, POTASSIUM CHLORIDE, SODIUM ASCORBATE, AND ASCORBIC ACID 7.5-2.691G
KIT ORAL
Qty: 1 KIT | Refills: 0 | Status: SHIPPED | OUTPATIENT
Start: 2024-12-04

## 2024-12-10 ENCOUNTER — ANTI-COAG VISIT (OUTPATIENT)
Dept: CARDIOLOGY | Facility: HOSPITAL | Age: 54
End: 2024-12-10
Payer: MEDICAID

## 2024-12-10 DIAGNOSIS — Z95.2 S/P MVR (MITRAL VALVE REPLACEMENT): ICD-10-CM

## 2024-12-10 DIAGNOSIS — I48.19 PERSISTENT ATRIAL FIBRILLATION: ICD-10-CM

## 2024-12-10 DIAGNOSIS — Z95.2 HISTORY OF MITRAL VALVE REPLACEMENT WITH MECHANICAL VALVE: ICD-10-CM

## 2024-12-10 DIAGNOSIS — Z79.01 ANTICOAGULATION MONITORING, INR RANGE 2.5-3.5: Primary | ICD-10-CM

## 2024-12-10 LAB
CTP QC/QA: YES
INR PPP: 2.8 (ref 2–3)
PROTHROMBIN TIME, POC: NORMAL (ref 2–3)

## 2024-12-10 PROCEDURE — 85610 PROTHROMBIN TIME: CPT

## 2024-12-10 PROCEDURE — 99211 OFF/OP EST MAY X REQ PHY/QHP: CPT

## 2024-12-10 RX ORDER — WARFARIN 4 MG/1
TABLET ORAL
Qty: 120 TABLET | Refills: 3 | Status: SHIPPED | OUTPATIENT
Start: 2024-12-10

## 2024-12-19 ENCOUNTER — OFFICE VISIT (OUTPATIENT)
Dept: CARDIOLOGY | Facility: CLINIC | Age: 54
End: 2024-12-19
Payer: MEDICAID

## 2024-12-19 VITALS
WEIGHT: 136.38 LBS | HEART RATE: 69 BPM | HEIGHT: 60 IN | DIASTOLIC BLOOD PRESSURE: 80 MMHG | TEMPERATURE: 98 F | BODY MASS INDEX: 26.77 KG/M2 | RESPIRATION RATE: 20 BRPM | OXYGEN SATURATION: 100 % | SYSTOLIC BLOOD PRESSURE: 126 MMHG

## 2024-12-19 DIAGNOSIS — I47.9 PAROXYSMAL TACHYCARDIA: ICD-10-CM

## 2024-12-19 DIAGNOSIS — I50.40 COMBINED SYSTOLIC AND DIASTOLIC HEART FAILURE, UNSPECIFIED HF CHRONICITY: ICD-10-CM

## 2024-12-19 DIAGNOSIS — Z79.01 ANTICOAGULATION MONITORING, INR RANGE 2.5-3.5: ICD-10-CM

## 2024-12-19 DIAGNOSIS — I48.19 PERSISTENT ATRIAL FIBRILLATION: ICD-10-CM

## 2024-12-19 DIAGNOSIS — Z01.810 PRE-OPERATIVE CARDIOVASCULAR EXAMINATION: Primary | ICD-10-CM

## 2024-12-19 DIAGNOSIS — I50.42 CHRONIC COMBINED SYSTOLIC AND DIASTOLIC HEART FAILURE: ICD-10-CM

## 2024-12-19 DIAGNOSIS — I38 ENDOCARDITIS, UNSPECIFIED CHRONICITY, UNSPECIFIED ENDOCARDITIS TYPE: ICD-10-CM

## 2024-12-19 DIAGNOSIS — Z95.2 S/P MVR (MITRAL VALVE REPLACEMENT): ICD-10-CM

## 2024-12-19 PROCEDURE — 99213 OFFICE O/P EST LOW 20 MIN: CPT | Mod: PBBFAC | Performed by: INTERNAL MEDICINE

## 2024-12-19 RX ORDER — ENOXAPARIN SODIUM 100 MG/ML
60 INJECTION SUBCUTANEOUS 2 TIMES DAILY
Qty: 20 EACH | Refills: 0 | Status: SHIPPED | OUTPATIENT
Start: 2024-12-19

## 2024-12-19 RX ORDER — SACUBITRIL AND VALSARTAN 49; 51 MG/1; MG/1
1 TABLET, FILM COATED ORAL 2 TIMES DAILY
Qty: 180 TABLET | Refills: 3 | Status: SHIPPED | OUTPATIENT
Start: 2024-12-19

## 2024-12-19 RX ORDER — METOPROLOL SUCCINATE 25 MG/1
25 TABLET, EXTENDED RELEASE ORAL 2 TIMES DAILY
Qty: 180 TABLET | Refills: 3 | Status: SHIPPED | OUTPATIENT
Start: 2024-12-19 | End: 2025-12-19

## 2024-12-19 NOTE — PROGRESS NOTES
[facility]  Outpatient Cardiology Clinic    Chief Complaint: f/u denies chest pain or sob since LV needs clearance for c                                 HPI:  Flako Power 54 y.o. male with history of mitral valve endocarditis status post mechanical MVR in February 2023 with Medtronic ats Number 31 valve (and left atrial appendage ligation) and atrial fibrillation.  At the time EF was 60% and he had clean coronaries.  In May 2023 an echocardiogram revealed EF of 36%. He was placed on Entresto 49/51 mg bid as well as Toprol 25 mg bid. Repeat echo showed EF 47%, normally functioning mechanical mitral valve.    Today feeling well. Comes in for regular follow up as well as risk assessment / coumadin hold prior to planned Colonoscopy for 1/26/25.                                                                                                                                                                                                                                                                                                                                                                                                                                                                               CARDIAC TESTING:  Results for orders placed during the hospital encounter of 05/28/24    Echo    Interpretation Summary    Left Ventricle: The left ventricle is normal in size. Normal wall thickness. The visually estimated ejection fraction is 45 - 50%. Biplane (2D) method of discs ejection fraction is 47%.    Right Ventricle: Normal right ventricular cavity size. Systolic function is borderline low.    Left Atrium: Left atrium is moderately dilated.    Right Atrium: Right atrium is mildly dilated.    Aortic Valve: The aortic valve is a trileaflet valve. There is no stenosis. Aortic valve peak velocity is 1.32 m/s. Mean gradient is 4 mmHg.    Mitral Valve: There is a mechanical valve in the mitral position. The mean pressure  gradient across the mitral valve is 4 mmHg at a heart rate of 80 bpm.    Tricuspid Valve: There is mild to moderate regurgitation.    Pulmonic Valve: There is mild regurgitation.    Pulmonary Artery: The estimated pulmonary artery systolic pressure is 28 mmHg.    IVC/SVC: Normal venous pressure at 3 mmHg.      Echo in 5/2023:  The left ventricle is normal in size with eccentric hypertrophy and moderately decreased systolic function.  The estimated ejection fraction is 36%.  Grade I left ventricular diastolic dysfunction.  Mild right ventricular enlargement with mildly reduced right ventricular systolic function.  Mild right atrial enlargement.  Mild pulmonic regurgitation.  There is a mechanical mitral valve prosthesis.  Mild tricuspid regurgitation.  Normal central venous pressure (3 mmHg).  The estimated PA systolic pressure is 25 mmHg.  There is no pulmonary hypertension.  Mild left atrial enlargement.       Results for orders placed during the hospital encounter of 01/16/23    Cardiac catheterization    Conclusion    There was no obstructive coronary artery disease.    Assessment/Plan:  - No obstructive coronary disease. Mgmt of infective endocarditis as per primary team.    Speedy Rendon MD  Interventional Cardiology/Structural Heart Disease  Cardiovascular [facility]       Patient Active Problem List   Diagnosis    Severe mitral valve regurgitation    Bacteremia    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    Endocarditis    S/P MVR (mitral valve replacement)    Paroxysmal tachycardia    Combined systolic and diastolic heart failure    Non-ischemic cardiomyopathy    Persistent atrial fibrillation     Past Surgical History:   Procedure Laterality Date    ABDOMINAL SURGERY      LEFT HEART CATHETERIZATION N/A 01/19/2023    Procedure: Left heart cath;  Surgeon: Speedy Rendon Jr., MD;  Location: [facility] CATH LAB;  Service: Cardiology;  Laterality: N/A;  Diagnostic Procedure  Only- Severe MR Awaiting Surgery.    MITRAL VALVE REPLACEMENT N/A 2/8/2023    Procedure: REPLACEMENT, MITRAL VALVE;  Surgeon: Jose Ramon Rodrigues MD;  Location: [facility] OR;  Service: Cardiology;  Laterality: N/A;  ECHO NOTIFIED    TONSILLECTOMY       Social History     Socioeconomic History    Marital status: Single   Tobacco Use    Smoking status: Never    Smokeless tobacco: Never   Substance and Sexual Activity    Alcohol use: Not Currently    Drug use: Never    Sexual activity: Not Currently   Social History Narrative    ** Merged History Encounter **          Social Drivers of Health     Financial Resource Strain: Medium Risk (7/29/2024)    Overall Financial Resource Strain (CARDIA)     Difficulty of Paying Living Expenses: Somewhat hard   Food Insecurity: Food Insecurity Present (7/29/2024)    Hunger Vital Sign     Worried About Running Out of Food in the Last Year: Sometimes true     Ran Out of Food in the Last Year: Sometimes true   Transportation Needs: No Transportation Needs (7/29/2024)    TRANSPORTATION NEEDS     Transportation : No   Physical Activity: Inactive (7/29/2024)    Exercise Vital Sign     Days of Exercise per Week: 0 days     Minutes of Exercise per Session: 0 min   Stress: Stress Concern Present (7/29/2024)    [employer] - Occupational Stress Questionnaire     Feeling of Stress : To some extent   Housing Stability: Low Risk  (7/29/2024)    Housing Stability Vital Sign     Unable to Pay for Housing in the Last Year: No     Homeless in the Last Year: No        Family History   Problem Relation Name Age of Onset    Diabetes type I Mother      Breast cancer Mother      Hypertension Mother      No Known Problems Father       Review of patient's allergies indicates:  No Known Allergies      ROS:                                                                                                                                                                             Negative  "except as stated in the history of present illness. See HPI for details.    PHYSICAL EXAM:  Visit Vitals  /80 (BP Location: Left arm, Patient Position: Sitting)   Pulse 69   Temp 98.4 °F (36.9 °C) (Oral)   Resp 20   Ht 4' 9" (1.448 m)   Wt 61.9 kg (136 lb 6.4 oz)   SpO2 100%   BMI 29.52 kg/m²         General: alert and oriented/no acute distress  Eye: EOMI/normal conjunctiva/no xanthelasma  HENT: normocephalic/moist oral mucosa  Neck: supple/nontender/no carotid bruit  Respiratory: lungs CTA/nonlabored respirations/BS equal/symmetrical expansion/no  chest wall tenderness  Cardiovascular: normal rate/normal rhythm/no murmur/normal peripheral perfusion/no  edema/no JVD  Gastrointestinal: soft/nontender  Musculoskeletal: normal ROM  Integumentary: warm/dry/pink/intact  Neurologic: alert/oriented/normal sensory/no focal deficits  Psychiatric: cooperative/appropriate mood and affect/normal judgment    Current Outpatient Medications   Medication Instructions    enoxaparin (LOVENOX) 60 mg, Subcutaneous, 2 times daily    metoprolol succinate (TOPROL-XL) 25 mg, Oral, 2 times daily    polyethylene glycol (MOVIPREP) 100-7.5-2.691 gram solution Take as directed prior to colonoscopy    sacubitriL-valsartan (ENTRESTO) 49-51 mg per tablet 1 tablet, Oral, 2 times daily    warfarin (COUMADIN) 4 MG tablet Take 2 tabs (8 mg) oral at bedtime        All medications, laboratory studies, cardiac diagnostic imaging independently reviewed.     Lab Results   Component Value Date    .00 07/29/2024    .00 06/05/2023    TRIG 80 07/29/2024    TRIG 71 06/05/2023    CREATININE 1.20 (H) 07/29/2024    MG 1.80 05/02/2023    K 4.1 07/29/2024        ASSESSMENT/PLAN:    Pre-Op Risk Assessment (planned Colonoscopy 1/26/2025)  History of mitral valve endocarditis  status post mechanical MVR in February 2023 with Medtronic ats Number 31 valve (and left atrial appendage ligation)  -Patient is a low-moderate risk candidate for a low risk " procedure  -Gave patient Coumadin hold instructions and continuation instructions per GI (same day after procedure vs next day)  -Pt will need to hold coumadin for 5 days prior to the procedure. Bridging with lovenox 1mg/kg should begin 3 days prior to the procedure (prescription ordered). Coumadin should be resumed post operatively and pt will need to be bridged with lovenox when hemostasis is achieved until INR is 2.5 or greater.   -Ordered Lovenox 60 mg inj BID (coverage for 10 days)    -Normally functioning valve per echo on 5/28/2024  -Pt will need endocarditis prophylaxis for certain procedures    Nonischemic cardiomyopathy  -Unclear etiology of drop in EF in May 2023, he developed new onset afib around that time and tachycardiac induced CM is a possibility.  EF recovered to 47% on repeat echo 5/28/2024 while on entresto 49/51mg bid and toprol 25mg bid  NYHA class I  -Patient reluctant to add more medications as he feels well.  As below we will continue to monitor his blood pressure with the nurse visit and needed will start Aldactone      HTN  -/80, well controlled  -c/w current regiment    Atrial fibrillation   -CHADsVASC 2  -Patient is in atrial fibrillation today but is rate controlled in the 70s.  Continue metoprolol succinate 25 mg bid and Coumadin (exception of pre procedural hold)      RTC in 6 months    Wes Michael MD  Internal Medicine - PGY-3  [facility] - Cardiology Clinic

## 2024-12-19 NOTE — PATIENT INSTRUCTIONS
Reminders:    Pt will need to hold coumadin for 5 days prior to the procedure. Bridging with lovenox 1mg/kg should begin 3 days prior to the procedure (prescription ordered).   Coumadin should be resumed post operatively and pt will need to be bridged with lovenox when hemostasis is achieved until INR is 2.5 or greater.   Ordered Lovenox 60 mg inj BID (coverage for 10 days)

## 2024-12-31 ENCOUNTER — TELEPHONE (OUTPATIENT)
Dept: ENDOSCOPY | Facility: HOSPITAL | Age: 54
End: 2024-12-31
Payer: MEDICAID

## 2024-12-31 NOTE — TELEPHONE ENCOUNTER
Called patient, date of birth verified. Instructed patient to start holding Coumadin five days prior to 01/16/2025 colonoscopy procedure (01/11/2025) and to start Lovenox bridging injections three days prior to 01/16/2025 colonoscopy procedure (01/13/2025). Allowed patient to repeat instructions, instructed patient to call with any questions or concerns. Patient verbalized understanding.MS

## 2025-01-07 ENCOUNTER — ANTI-COAG VISIT (OUTPATIENT)
Dept: CARDIOLOGY | Facility: HOSPITAL | Age: 55
End: 2025-01-07
Payer: MEDICAID

## 2025-01-07 DIAGNOSIS — Z79.01 ANTICOAGULATION MONITORING, INR RANGE 2.5-3.5: Primary | ICD-10-CM

## 2025-01-07 DIAGNOSIS — Z95.2 S/P MVR (MITRAL VALVE REPLACEMENT): ICD-10-CM

## 2025-01-07 DIAGNOSIS — I48.19 PERSISTENT ATRIAL FIBRILLATION: ICD-10-CM

## 2025-01-07 LAB
CTP QC/QA: YES
INR PPP: 3.5 (ref 2–3)
PROTHROMBIN TIME, POC: ABNORMAL (ref 2–3)

## 2025-01-07 PROCEDURE — 85610 PROTHROMBIN TIME: CPT

## 2025-01-07 PROCEDURE — 99211 OFF/OP EST MAY X REQ PHY/QHP: CPT

## 2025-01-15 ENCOUNTER — OFFICE VISIT (OUTPATIENT)
Dept: INTERNAL MEDICINE | Facility: CLINIC | Age: 55
End: 2025-01-15
Payer: MEDICAID

## 2025-01-15 VITALS
TEMPERATURE: 98 F | WEIGHT: 133.63 LBS | BODY MASS INDEX: 26.23 KG/M2 | OXYGEN SATURATION: 100 % | SYSTOLIC BLOOD PRESSURE: 132 MMHG | RESPIRATION RATE: 18 BRPM | HEART RATE: 65 BPM | DIASTOLIC BLOOD PRESSURE: 91 MMHG | HEIGHT: 60 IN

## 2025-01-15 DIAGNOSIS — Z00.00 HEALTHCARE MAINTENANCE: Primary | ICD-10-CM

## 2025-01-15 LAB — HBA1C MFR BLD: 5.4 %

## 2025-01-15 PROCEDURE — 83036 HEMOGLOBIN GLYCOSYLATED A1C: CPT | Mod: PBBFAC

## 2025-01-15 PROCEDURE — 99213 OFFICE O/P EST LOW 20 MIN: CPT | Mod: PBBFAC

## 2025-01-15 NOTE — PROGRESS NOTES
I have reviewed and concur with the resident's history, physical, assessment, and plan.  I have discussed with him all issues related to the diagnosis, workup and treatment plan. Care provided as reasonable and necessary.S/P MVR. Non obs CAD.stable. on coumadin    Aden Gutierrez MD  [facility]

## 2025-01-15 NOTE — PROGRESS NOTES
[facility] INTERNAL MEDICINE  Post-wards visit     SUBJECTIVE:      Chief Complaint: Follow-up     HPI: Flako Power is a 54 y.o. yo male w/ PMH of HTN and endocarditis s/p MVR 2/8/2023 who presents for follow-up.  Patient doing well today, states he occasionally experiences chest tightness and dizziness with lifting heavy objects (works as a ) but overall has been doing well. Continues to only be able to work part time and is actively attempting to seek disability. He denies any recent episodes of chest pain, sob, palpitations, orthopnea, LE swelling, PND, or diaphoresis. He endorses good compliance with medication regimen and denies any alcohol, tobacco, or recreational drug use.  Following with cardiology, last seen 1/7/2025  Also following in Coumadin clinic at [facility].      Past Medical History:   has a past medical history of Cough, Hypertension, Infective endocarditis, Mitral incompetence, and PICC (peripherally inserted central catheter) in place.     Past Surgical History:   has a past surgical history that includes Left heart catheterization (N/A, 01/19/2023); Abdominal surgery; Tonsillectomy; and Mitral valve replacement (N/A, 2/8/2023).     Family History:  family history includes Breast cancer in his mother; Diabetes type I in his mother; Hypertension in his mother; No Known Problems in his father.     Social History:   reports that he has never smoked. He has never used smokeless tobacco. He reports that he does not currently use alcohol. He reports that he does not use drugs.     Allergies:  has No Known Allergies.     Home Medications:  Prior to Admission medications    Medication Sig Start Date End Date Taking? Authorizing Provider   amLODIPine (NORVASC) 5 MG tablet Take 1 tablet (5 mg total) by mouth once daily. 1/26/23 1/26/24 Yes Alexia Alvarez,    warfarin (COUMADIN) 4 MG tablet Take 2 tabs (8 mg) oral at bedtime 4/17/23  Yes Magali Barboza MD   metoprolol tartrate (LOPRESSOR) 25 MG  "tablet Take 0.5 tablets (12.5 mg total) by mouth 2 (two) times daily. 5/2/23 5/8/23 Yes Tung Cosby,    baclofen (LIORESAL) 10 MG tablet Take 1 tablet (10 mg total) by mouth 3 (three) times daily as needed (muscle spasms). 12/20/22 12/27/22  Garfield Echeverria Jr., FNP   diclofenac (VOLTAREN) 75 MG EC tablet Take 1 tablet (75 mg total) by mouth 2 (two) times daily.  Patient not taking: Reported on 5/8/2023 12/16/22   Mandeep Qureshi MD   metoprolol succinate (TOPROL-XL) 25 MG 24 hr tablet Take 1 tablet (25 mg total) by mouth once daily. 5/8/23 5/7/24  Cheyenne Jean MD   mupirocin (BACTROBAN) 2 % ointment by Nasal route once daily. Apply to inside of both nostrils the night before surgery and the morning of surgery.  Patient not taking: Reported on 5/8/2023 1/30/23   Jose Ramon Rodrigues MD   sacubitriL-valsartan (ENTRESTO) 24-26 mg per tablet Take 1 tablet by mouth 2 (two) times daily. 5/8/23   Cheyenne Jean MD     Review of Systems   Constitutional:  Negative for chills and fever.   Eyes:  Negative for blurred vision and double vision.   Respiratory:  Negative for cough and hemoptysis.    Cardiovascular:  Negative for chest pain, palpitations, orthopnea, claudication and leg swelling.        Endorses chest tightness associated with lifting heavy objects    Gastrointestinal:  Negative for abdominal pain, blood in stool, constipation, diarrhea, heartburn, melena, nausea and vomiting.   Genitourinary:  Negative for dysuria.   Musculoskeletal:  Negative for myalgias and neck pain.   Neurological:  Negative for dizziness and headaches.         OBJECTIVE:     Vital signs:   BP (!) 132/91 (BP Location: Left arm, Patient Position: Sitting)   Pulse 65   Temp 98.2 °F (36.8 °C) (Oral)   Resp 18   Ht 4' 9" (1.448 m)   Wt 60.6 kg (133 lb 9.6 oz)   SpO2 100%   BMI 28.91 kg/m²      Physical Examination:  General: Patient resting comfortably, in no acute distress   Eye: PERRLA, EOMI, clear conjunctiva, eyelids normal  HENT: " Head-normocephalic and atraumatic  Neck: full range of motion, no thyromegaly or lymphadenopathy, trachea midline, supple, no palpable thyroid nodules  Respiratory: clear to auscultation bilaterally without wheezes, rales, rhonchi  Cardiovascular: irregular rhythm w/ normal rate, no murmer's noted. No gallops or rubs no JVD.  Capillary refill within normal limits.  Gastrointestinal: soft, non-tender, non-distended with normal bowel sounds, without masses to palpation  Genitourinary: no CVA tenderness to palpation  Musculoskeletal: full range of motion of all extremities/spine without limitation or discomfort  Integumentary: no rashes or skin lesions present  Neurologic: no signs of peripheral neurological deficit, motor/sensory function intact  Psychiatric:  alert and oriented, cognitive function intact, cooperative with exam, good eye contact, judgement and insight intact, mood and affect full range.     Labs:  CMP:   Lab Results   Component Value Date    GLUCOSE 89 07/29/2024    CALCIUM 9.6 07/29/2024    ALBUMIN 4.0 07/29/2024     07/29/2024    K 4.1 07/29/2024    CO2 26 07/29/2024     (H) 07/29/2024    BUN 14.6 07/29/2024    CREATININE 1.20 (H) 07/29/2024    ALKPHOS 64 07/29/2024    ALT 24 07/29/2024    AST 23 07/29/2024    BILITOT 0.6 07/29/2024      CBC:   Lab Results   Component Value Date    WBC 6.40 07/29/2024    HGB 14.4 07/29/2024    HCT 41.8 (L) 07/29/2024    MCV 87.1 07/29/2024    RDW 14.8 07/29/2024     Coagulation:   Lab Results   Component Value Date    INR 3.5 (A) 01/07/2025    APTT 30.5 02/08/2023     DM:   Lab Results   Component Value Date    HGBA1C 5.5 07/29/2024    .2 07/29/2024    CREATININE 1.20 (H) 07/29/2024     Thyroid:   Lab Results   Component Value Date    TSH 1.463 05/02/2023     LFTs:   Lab Results   Component Value Date    LABPROT 7.5 07/29/2024    ALBUMIN 4.0 07/29/2024    AST 23 07/29/2024    ALT 24 07/29/2024    ALKPHOS 64 07/29/2024       ASSESSMENT & PLAN:      HFrEF    Atrial fibrillation, rate controlled   Hx of Endocarditis s/p MVR - treated x6 weeks w/ IV antibiotics in January 2023   Hypertension   -echo performed 5/2/2023 showing EF 36% with grade 1 diastolic dysfunction.  Repeat echo 5/2024 showing improvement in EF to 45-40%.  -LHC performed 1/19/2023 showing non obstructive CAD   -continue Toprol-XL 25mg and Entresto 49-51mg.   -continue coumadin for anticoagulation, following with coumadin clinic at [facility]. Currently on Lovenox bridge in preporation for colonoscopy tomorrow, being managed by cardiology.   -given strict ED precautions to return if symptoms worsen or he has any new episodes of severe palpitations, chest pain, sob, or LE swelling.   -continue to follow with cardiology. Discussions at previous visit regarding initiating Aldactone which patient is currently refusing.      Hyperlipidemia   -goal LDL <100, last lipid panel with   -ASCVD risk score 11.4% with recommendation for moderate-to-high intensity statin   -patient previously attempting lifestyle modifications and was not interested in medication therapy.  LDL remains elevated at 124 which is above goal.  Informed patient he would likely benefit from further statin therapy.  Informed to discuss with cardiology.       Colonoscopy scheduled for tomorrow with Dr. Whitehead   TDAP vaccine administered 7/29/2024   Will discuss other vaccines at next visit after colonoscopy is complete   Patient with no history of tobacco abuse     Cheyenne Jean MD  [facility] Internal Medicine, HO-3

## 2025-01-16 ENCOUNTER — ANESTHESIA EVENT (OUTPATIENT)
Dept: ENDOSCOPY | Facility: HOSPITAL | Age: 55
End: 2025-01-16
Payer: MEDICAID

## 2025-01-16 ENCOUNTER — HOSPITAL ENCOUNTER (OUTPATIENT)
Facility: HOSPITAL | Age: 55
Discharge: HOME | End: 2025-01-16
Attending: INTERNAL MEDICINE | Admitting: INTERNAL MEDICINE
Payer: MEDICAID

## 2025-01-16 ENCOUNTER — ANESTHESIA (OUTPATIENT)
Dept: ENDOSCOPY | Facility: HOSPITAL | Age: 55
End: 2025-01-16
Payer: MEDICAID

## 2025-01-16 DIAGNOSIS — K57.30 DIVERTICULOSIS LARGE INTESTINE W/O PERFORATION OR ABSCESS W/O BLEEDING: ICD-10-CM

## 2025-01-16 DIAGNOSIS — Z12.11 COLON CANCER SCREENING: Primary | ICD-10-CM

## 2025-01-16 PROCEDURE — 37000009 HC ANESTHESIA EA ADD 15 MINS: Performed by: INTERNAL MEDICINE

## 2025-01-16 PROCEDURE — 63600175 PHARM REV CODE 636 W HCPCS: Performed by: NURSE ANESTHETIST, CERTIFIED REGISTERED

## 2025-01-16 PROCEDURE — D9220A PRA ANESTHESIA: Mod: ,,, | Performed by: NURSE ANESTHETIST, CERTIFIED REGISTERED

## 2025-01-16 PROCEDURE — 45378 DIAGNOSTIC COLONOSCOPY: CPT | Mod: ,,, | Performed by: INTERNAL MEDICINE

## 2025-01-16 PROCEDURE — 45378 DIAGNOSTIC COLONOSCOPY: CPT | Performed by: INTERNAL MEDICINE

## 2025-01-16 PROCEDURE — 37000008 HC ANESTHESIA 1ST 15 MINUTES: Performed by: INTERNAL MEDICINE

## 2025-01-16 RX ORDER — PROPOFOL 10 MG/ML
VIAL (ML) INTRAVENOUS
Status: DISCONTINUED | OUTPATIENT
Start: 2025-01-16 | End: 2025-01-16

## 2025-01-16 RX ORDER — LIDOCAINE HYDROCHLORIDE 20 MG/ML
INJECTION, SOLUTION EPIDURAL; INFILTRATION; INTRACAUDAL; PERINEURAL
Status: DISCONTINUED | OUTPATIENT
Start: 2025-01-16 | End: 2025-01-16

## 2025-01-16 RX ADMIN — PROPOFOL 25 MG: 10 INJECTION, EMULSION INTRAVENOUS at 02:01

## 2025-01-16 RX ADMIN — PROPOFOL 70 MG: 10 INJECTION, EMULSION INTRAVENOUS at 01:01

## 2025-01-16 RX ADMIN — LIDOCAINE HYDROCHLORIDE 50 MG: 20 INJECTION, SOLUTION EPIDURAL; INFILTRATION; INTRACAUDAL; PERINEURAL at 01:01

## 2025-01-16 RX ADMIN — PROPOFOL 25 MG: 10 INJECTION, EMULSION INTRAVENOUS at 01:01

## 2025-01-16 NOTE — ANESTHESIA POSTPROCEDURE EVALUATION
Anesthesia Post Evaluation    Patient: Flako Power    Procedure(s) Performed: Procedure(s) (LRB):  COLONOSCOPY (N/A)    Final Anesthesia Type: general      Patient location during evaluation: GI PACU  Patient participation: Yes- Able to Participate  Level of consciousness: awake and alert  Post-procedure vital signs: reviewed and stable  Pain management: adequate  Airway patency: patent    PONV status at discharge: No PONV  Anesthetic complications: no      Cardiovascular status: hemodynamically stable  Respiratory status: unassisted, spontaneous ventilation and room air  Hydration status: euvolemic  Follow-up not needed.              Vitals Value Taken Time   /98 01/16/25 1112   Temp 37.5 °C (99.5 °F) 01/16/25 1108   Pulse 94 01/16/25 1108   Resp 18 01/16/25 1108   SpO2 100 % 01/16/25 1108         No case tracking events are documented in the log.      Pain/Chen Score: No data recorded

## 2025-01-16 NOTE — H&P
[facility]  Pre-OP H&P    SUBJECTIVE:  Flako Power is a 54 y.o. y/o male w/ PMHx of HTN, endocarditis s/p MVR 2023 who presents today for screening Colonoscopy.     Patient has never had a colonoscopy but has had cologuard x3 that is negative but concern for inadequate sampling.     OBJECTIVE:  Vitals:    01/16/25 1112   BP: (!) 134/98   Pulse:    Resp:    Temp:        General: male in NAD. Not toxic appearing.   HENT: EOM intact.   Pulmonary: No respiratory distress. Effort normal.  Cardiovascular: Regular rate.   Abdomen: soft, non-tender, non-distended      PATHOLOGY:         ASSESSMENT/PLAN:    Flako Power is a 54 y.o. y/o male who presents today for above stated scheduled procedure     -All questions answered, patient consented   -Patient consented for screening colonoscopy         Cole Segura MD   PGY-2   [employer] General Surgery        Tremfya Pregnancy And Lactation Text: The risk during pregnancy and breastfeeding is uncertain with this medication.

## 2025-01-16 NOTE — ANESTHESIA PREPROCEDURE EVALUATION
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 01/21/2023    Endocarditis 02/19/2023    S/P MVR (mitral valve replacement) 03/27/2023    Paroxysmal tachycardia 05/02/2023    Combined systolic and diastolic heart failure 02/19/2024    Non-ischemic cardiomyopathy 06/19/2024    Persistent atrial fibrillation 06/19/2024     Resolved Ambulatory Problems     Diagnosis Date Noted    No Resolved Ambulatory Problems     Past Medical History:   Diagnosis Date    Cough     Hypertension     Infective endocarditis     Mitral incompetence     PICC (peripherally inserted central catheter) in place         Findings: [facility] JAN 2023  There was no obstructive coronary artery disease.     Assessment/Plan:  - No obstructive coronary disease. Mgmt of infective endocarditis as per primary team.     Speedy Rendon MD  Interventional Cardiology/Structural Heart Disease  Cardiovascular [facility]        ECHO MAY 2024    Left Ventricle: The left ventricle is normal in size. Normal wall thickness. The visually estimated ejection fraction is 45 - 50%. Biplane (2D) method of discs ejection fraction is 47%.    Right Ventricle: Normal right ventricular cavity size. Systolic function is borderline low.    Left Atrium: Left atrium is moderately dilated.    Right Atrium: Right atrium is mildly dilated.    Aortic " Valve: The aortic valve is a trileaflet valve. There is no stenosis. Aortic valve peak velocity is 1.32 m/s. Mean gradient is 4 mmHg.    Mitral Valve: There is a mechanical valve in the mitral position. The mean pressure gradient across the mitral valve is 4 mmHg at a heart rate of 80 bpm.    Tricuspid Valve: There is mild to moderate regurgitation.    Pulmonic Valve: There is mild regurgitation.    Pulmonary Artery: The estimated pulmonary artery systolic pressure is 28 mmHg.    IVC/SVC: Normal venous pressure at 3 mmHg.       Past Surgical History:   Procedure Laterality Date    ABDOMINAL SURGERY      LEFT HEART CATHETERIZATION N/A 01/19/2023    Procedure: Left heart cath;  Surgeon: Speedy Rendon Jr., MD;  Location: [facility] CATH LAB;  Service: Cardiology;  Laterality: N/A;  Diagnostic Procedure Only- Severe MR Awaiting Surgery.    MITRAL VALVE REPLACEMENT N/A 2/8/2023    Procedure: REPLACEMENT, MITRAL VALVE;  Surgeon: Jose Ramon Rodrigues MD;  Location: [facility] OR;  Service: Cardiology;  Laterality: N/A;  ECHO NOTIFIED    TONSILLECTOMY           Lab Results   Component Value Date    WBC 6.40 07/29/2024    HGB 14.4 07/29/2024    HCT 41.8 (L) 07/29/2024     07/29/2024    CHOL 194 07/29/2024    TRIG 80 07/29/2024    HDL 54 07/29/2024    ALT 24 07/29/2024    AST 23 07/29/2024     07/29/2024    K 4.1 07/29/2024     (H) 07/29/2024    CREATININE 1.20 (H) 07/29/2024    BUN 14.6 07/29/2024    CO2 26 07/29/2024    TSH 1.463 05/02/2023    INR 3.5 (A) 01/07/2025    HGBA1C 5.5 07/29/2024       No current facility-administered medications on file prior to encounter.     No current outpatient medications on file prior to encounter.           Pre-op Assessment    I have reviewed the Patient Summary Reports.     I have reviewed the Nursing Notes. I have reviewed the NPO Status.   I have reviewed the Medications.     Review of Systems  Anesthesia Hx:  No problems with previous Anesthesia   History of prior surgery  of interest to airway management or planning:          Denies Family Hx of Anesthesia complications.    Denies Personal Hx of Anesthesia complications.                    Hematology/Oncology:  Hematology Normal   Oncology Normal                                   EENT/Dental:  EENT/Dental Normal           Cardiovascular:  Cardiovascular Normal                                              Pulmonary:  Pulmonary Normal                       Renal/:  Renal/ Normal                 Hepatic/GI:  Hepatic/GI Normal                    Musculoskeletal:  Musculoskeletal Normal                Neurological:  Neurology Normal                                      Endocrine:  Endocrine Normal            Dermatological:  Skin Normal    Psych:  Psychiatric Normal                  Physical Exam  General: Well nourished, Cooperative, Alert and Oriented    Airway:  Mallampati: I / I  Mouth Opening: Normal  TM Distance: Normal  Tongue: Normal  Neck ROM: Normal ROM    Dental:  Intact    Chest/Lungs:  expiratory wheezes      Anesthesia Plan  Type of Anesthesia, risks & benefits discussed:    Anesthesia Type: Gen Natural Airway  Intra-op Monitoring Plan: Standard ASA Monitors  Post Op Pain Control Plan: IV/PO Opioids PRN  (medical reason for not using multimodal pain management)  Induction:  IV  Informed Consent: Informed consent signed with the Patient and all parties understand the risks and agree with anesthesia plan.  All questions answered. Patient consented to blood products? No  ASA Score: 3  Day of Surgery Review of History & Physical: H&P Update referred to the surgeon/provider.    Ready For Surgery From Anesthesia Perspective.     .

## 2025-01-16 NOTE — PROVATION PATIENT INSTRUCTIONS
Discharge Summary/Instructions after an Endoscopic Procedure  Patient Name: Flako Power  Patient MRN: [id number]  Patient YOB: 1970 Thursday, January 16, 2025  Adal Whitehead MD  Dear patient,  As a result of recent federal legislation (The Federal Cures Act), you may   receive lab or pathology results from your procedure in your [facility]   account before your physician is able to contact you. Your physician or   their representative will relay the results to you with their   recommendations at their soonest availability.  Thank you,  RESTRICTIONS:  During your procedure today, you received medications for sedation.  These   medications may affect your judgment, balance and coordination.  Therefore,   for 24 hours, you have the following restrictions:   - DO NOT drive a car, operate machinery, make legal/financial decisions,   sign important papers or drink alcohol.    ACTIVITY:  Today: no heavy lifting, straining or running due to procedural   sedation/anesthesia.  The following day: return to full activity including work.  DIET:  Eat and drink normally unless instructed otherwise.     TREATMENT FOR COMMON SIDE EFFECTS:  - Mild abdominal pain, nausea, belching, bloating or excessive gas:  rest,   eat lightly and use a heating pad.  - Sore Throat: treat with throat lozenges and/or gargle with warm salt   water.  - Because air was used during the procedure, expelling large amounts of air   from your rectum or belching is normal.  - If a bowel prep was taken, you may not have a bowel movement for 1-3 days.    This is normal.  SYMPTOMS TO WATCH FOR AND REPORT TO YOUR PHYSICIAN:  1. Abdominal pain or bloating, other than gas cramps.  2. Chest pain.  3. Back pain.  4. Signs of infection such as: chills or fever occurring within 24 hours   after the procedure.  5. Rectal bleeding, which would show as bright red, maroon, or black stools.   (A tablespoon of blood from the rectum is not serious,  especially if   hemorrhoids are present.)  6. Vomiting.  7. Weakness or dizziness.  GO DIRECTLY TO THE NEAREST EMERGENCY ROOM IF YOU HAVE ANY OF THE FOLLOWING:      Difficulty breathing              Chills and/or fever over 101 F   Persistent vomiting and/or vomiting blood   Severe abdominal pain   Severe chest pain   Black, tarry stools   Bleeding- more than one tablespoon   Any other symptom or condition that you feel may need urgent attention  Your doctor recommends these additional instructions:  If any biopsies were taken, your doctors clinic will contact you in 1 to 2   weeks with any results.  - Discharge patient to home (ambulatory).   - Resume previous diet today.   - Repeat colonoscopy in 5 years for screening purposes.   - Return to primary care physician as previously scheduled.   - Continue present medications.   - Patient has a contact number available for emergencies.  The signs and   symptoms of potential delayed complications were discussed with the   patient.  Return to normal activities tomorrow.  Written discharge   instructions were provided to the patient.  For questions, problems or results please call your physician - Adal Whitehead MD at Work:  [phone].  [facility] , EMERGENCY ROOM PHONE NUMBER: [phone]  IF A COMPLICATION OR EMERGENCY SITUATION ARISES AND YOU ARE UNABLE TO REACH   YOUR PHYSICIAN - GO DIRECTLY TO THE EMERGENCY ROOM.  MD Adal Carrasquillo MD  1/16/2025 2:40:46 PM  This report has been verified and signed electronically.  Dear patient,  As a result of recent federal legislation (The Federal Cures Act), you may   receive lab or pathology results from your procedure in your [facility]   account before your physician is able to contact you. Your physician or   their representative will relay the results to you with their   recommendations at their soonest availability.  Thank you,  PROVATION

## 2025-01-16 NOTE — DISCHARGE SUMMARY
[facility] - Endoscopy  Discharge Note  Short Stay    Procedure(s) (LRB):  COLONOSCOPY (N/A)  The procedure of colonoscopy was explained to the patient and consent obtained.  The patient is transferred to the endoscopy suite, general IV anesthesia was provided by anesthesia Services.  Rectal exam was normal.  The Olympus videocolonoscope was introduced per rectum and advanced around the colon to the cecum.  The ileocecal valve and appendiceal orifice were identified and normal as was the cecal mucosa.  Careful examination of the ascending, transverse and descending colon revealed no abnormalities.  There were few small, widely scattered diverticula in the sigmoid colon.  The rectum was normal including retroflexed view.  The endoscope was withdrawn.  Withdrawal time cecum to rectum 24 minutes.  The procedure was well tolerated and the patient returned to the recovery area for observation.      Discharge plan-the patient can resume his regular diet today and normal activities tomorrow.  Follow-up colonoscopy in 10 years is recommended although in 5 years she should have some type of noninvasive testing done.    OUTCOME: Patient tolerated treatment/procedure well without complication and is now ready for discharge.    DISPOSITION: Home or Self Care    FINAL DIAGNOSIS:  <principal problem not specified>    FOLLOWUP: With primary care provider    DISCHARGE INSTRUCTIONS:    Discharge Procedure Orders   Diet Adult Regular     Diet general     No dressing needed     Notify your health care provider if you experience any of the following:  temperature >100.4     Notify your health care provider if you experience any of the following:  persistent nausea and vomiting or diarrhea     Notify your health care provider if you experience any of the following:  severe uncontrolled pain     Notify your health care provider if you experience any of the following:  redness, tenderness, or signs of infection (pain, swelling,  redness, odor or green/yellow discharge around incision site)     Notify your health care provider if you experience any of the following:  difficulty breathing or increased cough     Notify your health care provider if you experience any of the following:  increased confusion or weakness     Call MD for:  temperature >100.4     Call MD for:  persistent nausea and vomiting     Call MD for:  severe uncontrolled pain     Call MD for:  difficulty breathing, headache or visual disturbances     Activity as tolerated        TIME SPENT ON DISCHARGE: 5 minutes

## 2025-01-16 NOTE — TRANSFER OF CARE
Anesthesia Transfer of Care Note    Patient: Flako Power    Procedure(s) Performed: Procedure(s) (LRB):  COLONOSCOPY (N/A)    Patient location: GI    Anesthesia Type: general    Post pain: adequate analgesia    Post assessment: no apparent anesthetic complications    Post vital signs: stable    Level of consciousness: awake    Nausea/Vomiting: no nausea/vomiting    Complications: none    Transfer of care protocol was followedComments: Report to Brigid POPE      107/56, HR 97 af, 100%, 16 RR, T 36

## 2025-01-17 VITALS
SYSTOLIC BLOOD PRESSURE: 114 MMHG | OXYGEN SATURATION: 99 % | RESPIRATION RATE: 18 BRPM | HEART RATE: 98 BPM | TEMPERATURE: 100 F | BODY MASS INDEX: 25.45 KG/M2 | HEIGHT: 60 IN | WEIGHT: 129.63 LBS | DIASTOLIC BLOOD PRESSURE: 86 MMHG

## 2025-01-30 ENCOUNTER — ANTI-COAG VISIT (OUTPATIENT)
Dept: CARDIOLOGY | Facility: HOSPITAL | Age: 55
End: 2025-01-30
Payer: MEDICAID

## 2025-01-30 DIAGNOSIS — I48.19 PERSISTENT ATRIAL FIBRILLATION: ICD-10-CM

## 2025-01-30 DIAGNOSIS — Z95.2 S/P MVR (MITRAL VALVE REPLACEMENT): ICD-10-CM

## 2025-01-30 DIAGNOSIS — Z79.01 ANTICOAGULATION MONITORING, INR RANGE 2.5-3.5: Primary | ICD-10-CM

## 2025-01-30 LAB
CTP QC/QA: YES
INR PPP: 2.9 (ref 2–3)
PROTHROMBIN TIME, POC: NORMAL (ref 2–3)

## 2025-01-30 PROCEDURE — 85610 PROTHROMBIN TIME: CPT

## 2025-02-27 ENCOUNTER — ANTI-COAG VISIT (OUTPATIENT)
Dept: CARDIOLOGY | Facility: HOSPITAL | Age: 55
End: 2025-02-27
Payer: MEDICAID

## 2025-02-27 DIAGNOSIS — Z79.01 ANTICOAGULATION MONITORING, INR RANGE 2.5-3.5: Primary | ICD-10-CM

## 2025-02-27 DIAGNOSIS — I48.19 PERSISTENT ATRIAL FIBRILLATION: ICD-10-CM

## 2025-02-27 DIAGNOSIS — Z95.2 S/P MVR (MITRAL VALVE REPLACEMENT): ICD-10-CM

## 2025-02-27 LAB
CTP QC/QA: YES
INR PPP: 2.5 (ref 2–3)
PROTHROMBIN TIME, POC: NORMAL (ref 2–3)

## 2025-02-27 PROCEDURE — 99211 OFF/OP EST MAY X REQ PHY/QHP: CPT

## 2025-02-27 PROCEDURE — 85610 PROTHROMBIN TIME: CPT

## 2025-03-25 ENCOUNTER — ANTI-COAG VISIT (OUTPATIENT)
Dept: CARDIOLOGY | Facility: HOSPITAL | Age: 55
End: 2025-03-25
Payer: MEDICAID

## 2025-03-25 DIAGNOSIS — Z95.2 HISTORY OF MITRAL VALVE REPLACEMENT WITH MECHANICAL VALVE: ICD-10-CM

## 2025-03-25 DIAGNOSIS — I48.19 PERSISTENT ATRIAL FIBRILLATION: ICD-10-CM

## 2025-03-25 DIAGNOSIS — Z79.01 ANTICOAGULATION MONITORING, INR RANGE 2.5-3.5: Primary | ICD-10-CM

## 2025-03-25 LAB
CTP QC/QA: YES
INR PPP: 2.4 (ref 2–3)
PROTHROMBIN TIME, POC: NORMAL (ref 2–3)

## 2025-03-25 PROCEDURE — 99211 OFF/OP EST MAY X REQ PHY/QHP: CPT

## 2025-03-25 PROCEDURE — 85610 PROTHROMBIN TIME: CPT

## 2025-04-10 ENCOUNTER — ANTI-COAG VISIT (OUTPATIENT)
Dept: CARDIOLOGY | Facility: HOSPITAL | Age: 55
End: 2025-04-10
Payer: MEDICAID

## 2025-04-10 DIAGNOSIS — Z79.01 ANTICOAGULATION MONITORING, INR RANGE 2.5-3.5: Primary | ICD-10-CM

## 2025-04-10 DIAGNOSIS — I48.19 PERSISTENT ATRIAL FIBRILLATION: ICD-10-CM

## 2025-04-10 DIAGNOSIS — Z95.2 HISTORY OF MITRAL VALVE REPLACEMENT WITH MECHANICAL VALVE: ICD-10-CM

## 2025-04-10 LAB
CTP QC/QA: YES
INR PPP: 2.9 (ref 2–3)
PROTHROMBIN TIME, POC: NORMAL (ref 2–3)

## 2025-04-10 PROCEDURE — 99211 OFF/OP EST MAY X REQ PHY/QHP: CPT

## 2025-04-10 PROCEDURE — 85610 PROTHROMBIN TIME: CPT

## 2025-05-08 ENCOUNTER — ANTI-COAG VISIT (OUTPATIENT)
Dept: CARDIOLOGY | Facility: HOSPITAL | Age: 55
End: 2025-05-08
Payer: MEDICAID

## 2025-05-08 DIAGNOSIS — Z95.2 HISTORY OF MITRAL VALVE REPLACEMENT WITH MECHANICAL VALVE: ICD-10-CM

## 2025-05-08 DIAGNOSIS — Z79.01 ANTICOAGULATION MONITORING, INR RANGE 2.5-3.5: Primary | ICD-10-CM

## 2025-05-08 DIAGNOSIS — I48.19 PERSISTENT ATRIAL FIBRILLATION: ICD-10-CM

## 2025-05-08 LAB
CTP QC/QA: YES
INR PPP: 2.6 (ref 2–3)
PROTHROMBIN TIME, POC: NORMAL (ref 2–3)

## 2025-05-08 PROCEDURE — 99211 OFF/OP EST MAY X REQ PHY/QHP: CPT

## 2025-05-08 PROCEDURE — 85610 PROTHROMBIN TIME: CPT

## 2025-06-05 ENCOUNTER — ANTI-COAG VISIT (OUTPATIENT)
Dept: CARDIOLOGY | Facility: HOSPITAL | Age: 55
End: 2025-06-05
Payer: MEDICAID

## 2025-06-05 DIAGNOSIS — I48.19 PERSISTENT ATRIAL FIBRILLATION: ICD-10-CM

## 2025-06-05 DIAGNOSIS — Z79.01 ANTICOAGULATION MONITORING, INR RANGE 2.5-3.5: Primary | ICD-10-CM

## 2025-06-05 DIAGNOSIS — Z95.2 HISTORY OF MITRAL VALVE REPLACEMENT WITH MECHANICAL VALVE: ICD-10-CM

## 2025-06-05 LAB
CTP QC/QA: YES
INR PPP: 3.5 (ref 2–3)
PROTHROMBIN TIME, POC: ABNORMAL (ref 2–3)

## 2025-06-05 PROCEDURE — 99211 OFF/OP EST MAY X REQ PHY/QHP: CPT

## 2025-06-05 PROCEDURE — 85610 PROTHROMBIN TIME: CPT

## 2025-06-20 ENCOUNTER — OFFICE VISIT (OUTPATIENT)
Dept: CARDIOLOGY | Facility: CLINIC | Age: 55
End: 2025-06-20
Payer: MEDICAID

## 2025-06-20 VITALS
BODY MASS INDEX: 25.88 KG/M2 | HEART RATE: 80 BPM | OXYGEN SATURATION: 100 % | DIASTOLIC BLOOD PRESSURE: 88 MMHG | WEIGHT: 131.81 LBS | RESPIRATION RATE: 20 BRPM | SYSTOLIC BLOOD PRESSURE: 138 MMHG | TEMPERATURE: 98 F | HEIGHT: 60 IN

## 2025-06-20 DIAGNOSIS — Z95.2 S/P MVR (MITRAL VALVE REPLACEMENT): ICD-10-CM

## 2025-06-20 DIAGNOSIS — I10 HYPERTENSION, UNSPECIFIED TYPE: Primary | ICD-10-CM

## 2025-06-20 DIAGNOSIS — I42.8 NON-ISCHEMIC CARDIOMYOPATHY: ICD-10-CM

## 2025-06-20 DIAGNOSIS — I48.19 PERSISTENT ATRIAL FIBRILLATION: ICD-10-CM

## 2025-06-20 DIAGNOSIS — I50.40 COMBINED SYSTOLIC AND DIASTOLIC HEART FAILURE, UNSPECIFIED HF CHRONICITY: ICD-10-CM

## 2025-06-20 LAB
OHS QRS DURATION: 92 MS
OHS QTC CALCULATION: 449 MS

## 2025-06-20 PROCEDURE — 99214 OFFICE O/P EST MOD 30 MIN: CPT | Mod: PBBFAC | Performed by: INTERNAL MEDICINE

## 2025-06-20 NOTE — PROGRESS NOTES
[facility]  [facility]    Chief Complaint: 6 month follow up  (No cardiac complaints )                                 HPI:  Flako Power 54 y.o. male with PMH of mitral valve endocarditis status post mechanical MVR in February 2023 with Medtronic ats Number 31 valve (and left atrial appendage ligation) and Persistent atrial fibrillation.  At the time EF was 60% and he had clean coronaries.  In May 2023 an echocardiogram revealed EF of 36%. He was placed on Entresto 49/51 mg bid as well as Toprol 25 mg bid. Repeat echo showed EF 47%, normally functioning mechanical mitral valve.    Today feeling well with no cardiovascular complaints whatsoever. No exertional chest pain, shortness of breath, fatigue.  Patient also denies orthopnea, PND, lower extremity edema, palpitations, dizziness, lightheadedness or syncope. Patient is compliant with Coumadin and has no bleeding.  Last INR 3.5     BP elevated intimally at 148/94, repeat similarly around 137. Reports home BP has been stable around 120s.                                                                                                                                                                                                                                                                                                                                                                                                                                                                               CARDIAC TESTING:  Results for orders placed during the hospital encounter of 05/28/24    Echo    Interpretation Summary    Left Ventricle: The left ventricle is normal in size. Normal wall thickness. The visually estimated ejection fraction is 45 - 50%. Biplane (2D) method of discs ejection fraction is 47%.    Right Ventricle: Normal right ventricular cavity size. Systolic function is borderline low.    Left Atrium: Left atrium is moderately dilated.    Right  Atrium: Right atrium is mildly dilated.    Aortic Valve: The aortic valve is a trileaflet valve. There is no stenosis. Aortic valve peak velocity is 1.32 m/s. Mean gradient is 4 mmHg.    Mitral Valve: There is a mechanical valve in the mitral position. The mean pressure gradient across the mitral valve is 4 mmHg at a heart rate of 80 bpm.    Tricuspid Valve: There is mild to moderate regurgitation.    Pulmonic Valve: There is mild regurgitation.    Pulmonary Artery: The estimated pulmonary artery systolic pressure is 28 mmHg.    IVC/SVC: Normal venous pressure at 3 mmHg.      Echo in 5/2023:  The left ventricle is normal in size with eccentric hypertrophy and moderately decreased systolic function.  The estimated ejection fraction is 36%.  Grade I left ventricular diastolic dysfunction.  Mild right ventricular enlargement with mildly reduced right ventricular systolic function.  Mild right atrial enlargement.  Mild pulmonic regurgitation.  There is a mechanical mitral valve prosthesis.  Mild tricuspid regurgitation.  Normal central venous pressure (3 mmHg).  The estimated PA systolic pressure is 25 mmHg.  There is no pulmonary hypertension.  Mild left atrial enlargement.       Results for orders placed during the hospital encounter of 01/16/23    Cardiac catheterization    Conclusion    There was no obstructive coronary artery disease.    Assessment/Plan:  - No obstructive coronary disease. Mgmt of infective endocarditis as per primary team.    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    Endocarditis    S/P MVR (mitral valve replacement)    Paroxysmal tachycardia    Combined systolic and diastolic heart failure    Non-ischemic cardiomyopathy    Persistent atrial fibrillation     Diverticulosis large intestine w/o perforation or abscess w/o bleeding     Past Surgical History:   Procedure Laterality Date    ABDOMINAL SURGERY      COLONOSCOPY N/A 1/16/2025    Procedure: COLONOSCOPY;  Surgeon: Adal Whitehead MD;  [facility] ENDOSCOPY;  Service: Endoscopy;  Laterality: N/A;    LEFT HEART CATHETERIZATION N/A 01/19/2023    Procedure: Left heart cath;  Surgeon: Speedy Rendon Jr., MD;  Location: [facility] CATH LAB;  Service: Cardiology;  Laterality: N/A;  Diagnostic Procedure Only- Severe MR Awaiting Surgery.    MITRAL VALVE REPLACEMENT N/A 2/8/2023    Procedure: REPLACEMENT, MITRAL VALVE;  Surgeon: Jose Ramon Rodrigues MD;  Location: [facility] OR;  Service: Cardiology;  Laterality: N/A;  ECHO NOTIFIED    TONSILLECTOMY       Social History     Socioeconomic History    Marital status: Single   Tobacco Use    Smoking status: Never    Smokeless tobacco: Never   Substance and Sexual Activity    Alcohol use: Not Currently     Comment: stop 5 years    Drug use: Never    Sexual activity: Not Currently   Social History Narrative    ** Merged History Encounter **          Social Drivers of Health     Financial Resource Strain: Medium Risk (7/29/2024)    Overall Financial Resource Strain (CARDIA)     Difficulty of Paying Living Expenses: Somewhat hard   Food Insecurity: Food Insecurity Present (7/29/2024)    Hunger Vital Sign     Worried About Running Out of Food in the Last Year: Sometimes true     Ran Out of Food in the Last Year: Sometimes true   Transportation Needs: No Transportation Needs (7/29/2024)    TRANSPORTATION NEEDS     Transportation : No   Physical Activity: Inactive (7/29/2024)    Exercise Vital Sign     Days of Exercise per Week: 0 days     Minutes of Exercise per Session: 0 min   Stress: Stress Concern Present (7/29/2024)    [employer] [place] [employer] - Occupational Stress Questionnaire     Feeling of Stress : To some extent   Housing Stability: Unknown (7/29/2024)    Housing  "Stability Vital Sign     Unable to Pay for Housing in the Last Year: No     Homeless in the Last Year: No        Family History   Problem Relation Name Age of Onset    Diabetes type I Mother      Breast cancer Mother      Hypertension Mother      No Known Problems Father       Review of patient's allergies indicates:  No Known Allergies      ROS:                                                                                                                                                                             Negative except as stated in the history of present illness. See HPI for details.    PHYSICAL EXAM:  Visit Vitals  /88 (BP Location: Left arm, Patient Position: Sitting)   Pulse 80   Temp 98.3 °F (36.8 °C) (Oral)   Resp 20   Ht 4' 9" (1.448 m)   Wt 59.8 kg (131 lb 12.8 oz)   SpO2 100%   BMI 28.52 kg/m²         General: alert and oriented/no acute distress  Eye: EOMI/normal conjunctiva/no xanthelasma  HENT: normocephalic/moist oral mucosa  Neck: supple/nontender/no carotid bruit  Respiratory: lungs CTA/nonlabored respirations/BS equal/symmetrical expansion/no  chest wall tenderness  Cardiovascular: normal rate/normal rhythm/no murmur/normal peripheral perfusion/no  edema/no JVD  Gastrointestinal: soft/nontender  Musculoskeletal: normal ROM  Integumentary: warm/dry/pink/intact  Neurologic: alert/oriented/normal sensory/no focal deficits  Psychiatric: cooperative/appropriate mood and affect/normal judgment    Current Outpatient Medications   Medication Instructions    metoprolol succinate (TOPROL-XL) 25 mg, Oral, 2 times daily    sacubitriL-valsartan (ENTRESTO) 49-51 mg per tablet 1 tablet, Oral, 2 times daily    warfarin (COUMADIN) 4 MG tablet Take 2 tabs (8 mg) oral at bedtime        All medications, laboratory studies, cardiac diagnostic imaging independently reviewed.     Lab Results   Component Value Date    .00 07/29/2024    .00 06/05/2023    TRIG 80 07/29/2024    TRIG 71 06/05/2023 "    CREATININE 1.20 (H) 07/29/2024    MG 1.80 05/02/2023    K 4.1 07/29/2024        ASSESSMENT/PLAN:    History of mitral valve endocarditis  status post mechanical MVR in February 2023 with Medtronic ats Number 31 valve (and left atrial appendage ligation)  -Normally functioning valve per echo on 5/28/2024  -Continue coumadin 8mg. Continue INR monitoring at [facility]  -Pt will need endocarditis prophylaxis for certain procedures    Nonischemic cardiomyopathy  -Unclear etiology of drop in EF in May 2023, he developed new onset afib around that time and tachycardiac induced CM is a possibility.  -EF recovered to 47% on repeat echo 5/28/2024 while on entresto 49/51mg bid and toprol 25mg bid  -NYHA class I  -Patient reluctant to add more medications as he feels well.  As below we will continue to monitor his blood pressure with the nurse visit and needed will start Aldactone    HTN  -BP elevated initially at 148/94, repeat similarly around 137  -c/w current regiment  -Advise patient to maintain BP logs  -Nurse visit for BP check. Plan on adjusting medications at that time if BP remains elevated    Persistent Atrial fibrillation   -CHADsVASC 2  -Patient is in atrial fibrillation today but is rate controlled in the 70-80s.  Continue metoprolol succinate 25 mg bid and Coumadin (exception of pre procedural hold)      RTC in 6 months    Marla Alberto DO  Internal Medicine - PGY-1  [facility] - Cardiology Clinic

## 2025-06-20 NOTE — PROGRESS NOTES
"Cardiology Attending  06/20/2025 12:13 PM    I evaluated Flako Power in Cardiology Clinic and discussed the patient's symptoms, findings, and management plan with the resident.   Mr. Flako Power is a 54 y.o. male.  The patient is seen in cardiology clinic for MVR, LAAL, nonischemic cardiomyopathy, HTN, persistent AFib.    Blood pressure 138/88, pulse 80, temperature 98.3 °F (36.8 °C), temperature source Oral, resp. rate 20, height 4' 9" (1.448 m), weight 59.8 kg (131 lb 12.8 oz), SpO2 100%.  The patient is in no apparent distress.      Current Outpatient Medications   Medication Instructions    metoprolol succinate (TOPROL-XL) 25 mg, Oral, 2 times daily    sacubitriL-valsartan (ENTRESTO) 49-51 mg per tablet 1 tablet, Oral, 2 times daily    warfarin (COUMADIN) 4 MG tablet Take 2 tabs (8 mg) oral at bedtime       Results for orders placed during the hospital encounter of 05/28/24    Echo    Interpretation Summary    Left Ventricle: The left ventricle is normal in size. Normal wall thickness. The visually estimated ejection fraction is 45 - 50%. Biplane (2D) method of discs ejection fraction is 47%.    Right Ventricle: Normal right ventricular cavity size. Systolic function is borderline low.    Left Atrium: Left atrium is moderately dilated.    Right Atrium: Right atrium is mildly dilated.    Aortic Valve: The aortic valve is a trileaflet valve. There is no stenosis. Aortic valve peak velocity is 1.32 m/s. Mean gradient is 4 mmHg.    Mitral Valve: There is a mechanical valve in the mitral position. The mean pressure gradient across the mitral valve is 4 mmHg at a heart rate of 80 bpm.    Tricuspid Valve: There is mild to moderate regurgitation.    Pulmonic Valve: There is mild regurgitation.    Pulmonary Artery: The estimated pulmonary artery systolic pressure is 28 mmHg.    IVC/SVC: Normal venous pressure at 3 mmHg.    No results found for this or any previous visit.    Results for orders placed during " the hospital encounter of 01/16/23    Cardiac catheterization    Conclusion    There was no obstructive coronary artery disease.    The procedure log was documented by No documenter listed and verified by Speedy Rendon Jr, MD.    Date: 1/19/2023  Time: 5:19 PM    Procedure:  Left heart catheterization    Preoperative diagnosis:  Infective endocarditis    Postoperative diagnosis:  Infective endocarditis    Access:  Right common femoral artery    Estimated blood loss: 10 cc  Complications: None    Summary:  Consent obtained. Risks and benefits discussed with the patient. The patient was brought to the cath lab in a fasting state. The patient was prepped and draped in usual sterile fashion. A preprocedure time-out was performed.    Ultrasound-guided right common femoral arterial access via modified Seldinger technique using a micropuncture kit. A 6 [place] sheath was inserted into the right common femoral artery.    A 5 [place] JL3.5 catheter was used to cannulate the left main coronary artery; angiography was performed, and multiple fluoroscopic views were obtained.  A 5 [place] JR4 catheter was used to cannulate the right coronary artery; angiography was performed, and multiple fluoroscopic views were obtained.    At the end the procedure, all wires and catheters were removed.  Hemostasis achieved with manual compression.    Findings:  There was no obstructive coronary artery disease.    Assessment/Plan:  - No obstructive coronary disease. Mgmt of infective endocarditis as per primary team.    Speedy Rendon MD  Interventional Cardiology/Structural Heart Disease  [facility]      Lab Results   Component Value Date    CHOL 194 07/29/2024      Lab Results   Component Value Date    HGB 14.4 07/29/2024      Lab Results   Component Value Date    CREATININE 1.20 (H) 07/29/2024      Lab Results   Component Value Date    .9 (H) 05/02/2023          The plan is to have patient do 2 week blood  pressure checks blood pressure is elevated in clinic today  Nurse's visit for blood pressure check  Discussed possibility of needing to adjust blood pressure medication doses    Speedy Miguel MD

## 2025-07-03 ENCOUNTER — ANTI-COAG VISIT (OUTPATIENT)
Dept: CARDIOLOGY | Facility: HOSPITAL | Age: 55
End: 2025-07-03
Payer: MEDICAID

## 2025-07-03 DIAGNOSIS — Z95.2 HISTORY OF MITRAL VALVE REPLACEMENT WITH MECHANICAL VALVE: Primary | ICD-10-CM

## 2025-07-03 DIAGNOSIS — Z79.01 ANTICOAGULATION MONITORING, INR RANGE 2.5-3.5: ICD-10-CM

## 2025-07-03 DIAGNOSIS — I48.19 PERSISTENT ATRIAL FIBRILLATION: ICD-10-CM

## 2025-07-03 LAB
CTP QC/QA: YES
INR PPP: 2.4 (ref 2–3)
PROTHROMBIN TIME, POC: NORMAL (ref 2–3)

## 2025-07-03 PROCEDURE — 99211 OFF/OP EST MAY X REQ PHY/QHP: CPT

## 2025-07-23 ENCOUNTER — ANTI-COAG VISIT (OUTPATIENT)
Dept: CARDIOLOGY | Facility: HOSPITAL | Age: 55
End: 2025-07-23
Payer: MEDICAID

## 2025-07-23 DIAGNOSIS — Z79.01 ANTICOAGULATION MONITORING, INR RANGE 2.5-3.5: Primary | ICD-10-CM

## 2025-07-23 DIAGNOSIS — I48.19 PERSISTENT ATRIAL FIBRILLATION: ICD-10-CM

## 2025-07-23 DIAGNOSIS — Z95.2 HISTORY OF MITRAL VALVE REPLACEMENT WITH MECHANICAL VALVE: ICD-10-CM

## 2025-07-23 LAB
CTP QC/QA: YES
INR PPP: 3.4 (ref 2–3)
PROTHROMBIN TIME, POC: ABNORMAL (ref 2–3)

## 2025-07-23 PROCEDURE — 85610 PROTHROMBIN TIME: CPT

## 2025-07-23 NOTE — PROGRESS NOTES
Coumadin Clinic   The patient had INR checked to evaluate for adequate anticoagulation.  Please see labs below:    Lab Results   Component Value Date    INR 3.4 (A) 07/23/2025    INR 2.4 07/03/2025    INR 3.5 (A) 06/05/2025      Lab Results   Component Value Date    HGB 14.4 07/29/2024    HGB 13.6 (L) 05/02/2023    HGB 8.3 (L) 02/15/2023      ASSESS:  The usual recommended INR range is 2.0-3.0 for Afib and 2.5-3.5 for valve replacement.  Consider monitoring the H/H in view the patient being on coumadin.   REC:  Continue current coumadin dose if patient is in the therapeutic range.  Adjust coumadin dose if needed.  Periodically recheck INR  Speedy Miguel MD

## 2025-07-28 PROBLEM — I10 HYPERTENSION: Status: ACTIVE | Noted: 2025-07-28

## 2025-07-28 PROBLEM — E78.5 HYPERLIPIDEMIA: Status: ACTIVE | Noted: 2025-07-28

## 2025-07-28 NOTE — PROGRESS NOTES
"[facility] Internal Medicine Clinic Note    CC:  6mo follow up    HISTORY:  Flako Power is a 54 y.o. male with PMHx of HFrEF (EF 47% 2/2024), Afib, HTN, HLD, and endocarditis s/p MVR 2/8/2023 who presents to the clinic for follow-up. He reports he has been doing well and is compliant with all his home meds. Requesting entresto refills. He reports he has a BP cuff at home, but it is broken. Says he drinks "plenty" of water. Pt amenable to receiving Shingles vaccine today. When offered the patient pneumococcal vaccine in office today, pt reluctant but said he would think about it. Denies chest pain, SOB, feelings of racing heart, palpitations, fever, chills, N/V/C/D, dysuria, hematuria, dizziness, weakness.     Patient Care Team:  Rita Smith MD as PCP - General (Internal Medicine)  No, Primary Doctor    PMHx:   Problem List[1]    Surgical Hx:  Past Surgical History:   Procedure Laterality Date    ABDOMINAL SURGERY      COLONOSCOPY N/A 1/16/2025    Procedure: COLONOSCOPY;  Surgeon: Adal Whitehead MD;  Location: [facility] ENDOSCOPY;  Service: Endoscopy;  Laterality: N/A;    LEFT HEART CATHETERIZATION N/A 01/19/2023    Procedure: Left heart cath;  Surgeon: Speedy Rendon Jr., MD;  Location: [facility] CATH LAB;  Service: Cardiology;  Laterality: N/A;  Diagnostic Procedure Only- Severe MR Awaiting Surgery.    MITRAL VALVE REPLACEMENT N/A 2/8/2023    Procedure: REPLACEMENT, MITRAL VALVE;  Surgeon: Jose Ramon Rodrigues MD;  Location: [facility] OR;  Service: Cardiology;  Laterality: N/A;  ECHO NOTIFIED    TONSILLECTOMY       Family Hx:  Family History   Problem Relation Name Age of Onset    Diabetes type I Mother      Breast cancer Mother      Hypertension Mother      No Known Problems Father       Allergies:   Review of patient's allergies indicates:  No Known Allergies    MEDICATIONS:  Current Outpatient Medications   Medication Instructions    blood pressure monitor (BLOOD PRESSURE KIT) Kit Use cuff to check blood pressure daily. Record " "values.    blood pressure monitor (BLOOD PRESSURE KIT) Kit Use kit to check blood pressure daily. Record values.    metoprolol succinate (TOPROL-XL) 25 mg, Oral, 2 times daily    rosuvastatin (CRESTOR) 5 mg, Oral, Daily    sacubitriL-valsartan (ENTRESTO) 49-51 mg per tablet 1 tablet, Oral, 2 times daily    warfarin (COUMADIN) 4 MG tablet Take 2 tabs (8 mg) oral at bedtime      PHYSICAL EXAM:    Vital Signs:  /68 (BP Location: Left arm, Patient Position: Sitting)   Pulse 64   Temp 98.1 °F (36.7 °C) (Oral)   Resp 18   Ht 4' 9" (1.448 m)   Wt 59.1 kg (130 lb 6.4 oz)   SpO2 100% Comment: room air  BMI 28.22 kg/m²     Constitutional: Appears stated age, resting comfortably, in no acute distress   HEENT: Nomocephalic, Atraumatic, conjunctiva normal, No scleral icterus, EOMI, PERRLA   Neck: Supple, no thyromegaly, no LAD   CV: RRR, s1/s2 heard; no murmurs   Resp: CTAB. No wheezes or crackles. Not in respiratory distress. On RA  GI: Soft, non-distended, non-tender; bowel sounds present  : No thakkar in place   Skin: Warm, dry  Extremities: No edema. 2+ pulses distally   Neurologic: awake, alert and oriented x3   Psychiatric: normal mood, normal affect     Labs:   7/29/2024    Assessment & Plan:  Flako Power is a 54 y.o. 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 Amenable to starting crestor 5 today.     Hyperlipidemia   Goal LDL <100, last lipid panel with  7/29/2024; ASCVD risk score 10.2% = intermediate risk, can optimize to 4.7%  - Start crestor 5 daily; advised pt of risk of myalgia ADR and instructed him to call clinic if he starts having issues     - FLP at next visit      Hypertension  BP in office today 128/68; Home BP cuff   - BP cuff ordered today for home use and log given, instructed pt to record values and call us if he is having abnormal values or becomes symptomatic.     HFrEF (EF 47% " 2/2024)  Atrial fibrillation, rate controlled   Hx of Endocarditis s/p MVR - treated x6 weeks w/ IV antibiotics (1/2023)   Echo 5/2/2023: EF 36% with G1DD.  Repeat echo 5/2024 showing improvement in EF to 45-40%. [facility] (1/19/2023) w/ non obstructive CAD   -continue Toprol-XL 25mg bid and Entresto 49-51mg bid   -continue coumadin for anticoagulation, following with coumadin clinic at [facility]. INR goal 2.5-3.5  -given strict ED precautions to return if symptoms worsen or he has any new episodes of severe palpitations, chest pain, sob, or LE swelling.   -continue to follow with cardiology. Discussions at previous visit regarding initiating Aldactone which patient is currently refusing.       Immunizations:  COVID: will address at next visit  Influenza: N/A  RSV: N/A  Pneumococcal: refused today, will address at next visit  Tetantus: last admin 7/29/2024; UTD  Hep B: will address at next visit  HPV: N/A  Zoster: UTD; 1 dose 7/29/25    Screenings:  Lung Ca: never smoker   Colon Ca: colonoscopy 1/16/2025 negative   AAA: never smoker   Osteoporosis: N/A  Prediabetes/Diabetes: A1c 5.4 1/15/2025; UTD  Statins (Increased ASCVD): starting crestor 5 daily today   HIV: negative 1/17/2023  Hep C: negative 1/17/2023    Follow up in about 6 months (around 1/29/2026). In addition to their scheduled follow up, the patient has also been instructed to follow up on as needed basis.     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

## 2025-07-29 ENCOUNTER — OFFICE VISIT (OUTPATIENT)
Dept: INTERNAL MEDICINE | Facility: CLINIC | Age: 55
End: 2025-07-29
Payer: MEDICAID

## 2025-07-29 VITALS
WEIGHT: 130.38 LBS | HEART RATE: 64 BPM | DIASTOLIC BLOOD PRESSURE: 68 MMHG | HEIGHT: 60 IN | TEMPERATURE: 98 F | SYSTOLIC BLOOD PRESSURE: 128 MMHG | RESPIRATION RATE: 18 BRPM | BODY MASS INDEX: 25.6 KG/M2 | OXYGEN SATURATION: 100 %

## 2025-07-29 DIAGNOSIS — I50.42 CHRONIC COMBINED SYSTOLIC AND DIASTOLIC HEART FAILURE: ICD-10-CM

## 2025-07-29 DIAGNOSIS — I10 HYPERTENSION, UNSPECIFIED TYPE: ICD-10-CM

## 2025-07-29 DIAGNOSIS — I50.40 COMBINED SYSTOLIC AND DIASTOLIC HEART FAILURE, UNSPECIFIED HF CHRONICITY: ICD-10-CM

## 2025-07-29 DIAGNOSIS — Z95.2 S/P MVR (MITRAL VALVE REPLACEMENT): ICD-10-CM

## 2025-07-29 DIAGNOSIS — E78.5 HYPERLIPIDEMIA, UNSPECIFIED HYPERLIPIDEMIA TYPE: ICD-10-CM

## 2025-07-29 DIAGNOSIS — I48.19 PERSISTENT ATRIAL FIBRILLATION: ICD-10-CM

## 2025-07-29 DIAGNOSIS — I47.9 PAROXYSMAL TACHYCARDIA: ICD-10-CM

## 2025-07-29 DIAGNOSIS — Z23 NEED FOR SHINGLES VACCINE: ICD-10-CM

## 2025-07-29 PROCEDURE — 90750 HZV VACC RECOMBINANT IM: CPT | Mod: PBBFAC

## 2025-07-29 PROCEDURE — 99214 OFFICE O/P EST MOD 30 MIN: CPT | Mod: PBBFAC

## 2025-07-29 PROCEDURE — 90471 IMMUNIZATION ADMIN: CPT | Mod: PBBFAC

## 2025-07-29 RX ORDER — ACETAMINOPHEN 500 MG
TABLET ORAL
Qty: 1 EACH | Refills: 0 | Status: SHIPPED | OUTPATIENT
Start: 2025-07-29

## 2025-07-29 RX ORDER — ZOSTER VACCINE RECOMBINANT, ADJUVANTED 50 MCG/0.5
0.5 KIT INTRAMUSCULAR ONCE
Qty: 1 EACH | Refills: 0 | Status: SHIPPED | OUTPATIENT
Start: 2025-07-29 | End: 2025-07-29 | Stop reason: SDUPTHER

## 2025-07-29 RX ORDER — ROSUVASTATIN CALCIUM 5 MG/1
5 TABLET, COATED ORAL DAILY
Qty: 90 TABLET | Refills: 3 | Status: SHIPPED | OUTPATIENT
Start: 2025-07-29 | End: 2026-07-29

## 2025-07-29 RX ORDER — SACUBITRIL AND VALSARTAN 49; 51 MG/1; MG/1
1 TABLET, FILM COATED ORAL 2 TIMES DAILY
Qty: 180 TABLET | Refills: 3 | Status: SHIPPED | OUTPATIENT
Start: 2025-07-29

## 2025-07-29 RX ADMIN — Medication 0.5 ML: at 08:07

## 2025-07-29 NOTE — PROGRESS NOTES
I have reviewed the notes, assessments, and discussed management plan at time of office visit which was performed by resident, I concur with the documentation of Flako Power.  Date of Service: 7/29/2025

## 2025-08-27 ENCOUNTER — ANTI-COAG VISIT (OUTPATIENT)
Dept: CARDIOLOGY | Facility: HOSPITAL | Age: 55
End: 2025-08-27
Payer: MEDICAID

## 2025-08-27 DIAGNOSIS — Z95.2 HISTORY OF MITRAL VALVE REPLACEMENT WITH MECHANICAL VALVE: ICD-10-CM

## 2025-08-27 DIAGNOSIS — Z79.01 ANTICOAGULATION MONITORING, INR RANGE 2.5-3.5: Primary | ICD-10-CM

## 2025-08-27 DIAGNOSIS — I48.19 PERSISTENT ATRIAL FIBRILLATION: ICD-10-CM

## 2025-08-27 LAB
CTP QC/QA: YES
INR PPP: 3.5 (ref 2–3)
PROTHROMBIN TIME, POC: ABNORMAL (ref 2–3)

## 2025-08-27 PROCEDURE — 99211 OFF/OP EST MAY X REQ PHY/QHP: CPT

## 2025-08-27 PROCEDURE — 85610 PROTHROMBIN TIME: CPT

## 2025-09-02 DIAGNOSIS — I50.40 COMBINED SYSTOLIC AND DIASTOLIC HEART FAILURE, UNSPECIFIED HF CHRONICITY: ICD-10-CM

## 2025-09-02 DIAGNOSIS — Z95.2 S/P MVR (MITRAL VALVE REPLACEMENT): ICD-10-CM

## 2025-09-02 DIAGNOSIS — I47.9 PAROXYSMAL TACHYCARDIA: ICD-10-CM

## 2025-09-03 RX ORDER — METOPROLOL SUCCINATE 25 MG/1
25 TABLET, EXTENDED RELEASE ORAL 2 TIMES DAILY
Qty: 180 TABLET | Refills: 3 | Status: SHIPPED | OUTPATIENT
Start: 2025-09-03 | End: 2026-09-03

## (undated) DEVICE — SHEATH INTRODUCER 5FR 10CM

## (undated) DEVICE — DRAIN CHEST DRY SUCTION

## (undated) DEVICE — KIT MINI STK MAX COAX 5FR 10CM

## (undated) DEVICE — SET PERFUSION

## (undated) DEVICE — INSERT INTRACK CLAMP ULT 66MM

## (undated) DEVICE — PAD DEFIB CADENCE ADULT R2

## (undated) DEVICE — SUT PROLENE 4-0 RB-1 BL MO

## (undated) DEVICE — Device

## (undated) DEVICE — ADHESIVE DERMABOND ADVANCED

## (undated) DEVICE — MANIFOLD 4 PORT

## (undated) DEVICE — SUT PROLENE BL 4-0 RB-1 36IN

## (undated) DEVICE — CANNULA PERF ART RA 8X3.5MM

## (undated) DEVICE — ELECTRODE BLADE E-Z CLEAN 4IN

## (undated) DEVICE — CATH IMPULSE FL4 5FR 100CM

## (undated) DEVICE — SUT ETHBND XTRA 1 OS-8 30IN

## (undated) DEVICE — DEVICE CLSR ATRICLIP FLEX 40MM

## (undated) DEVICE — SUCTION INTRA SUMP 20FG

## (undated) DEVICE — TRAY CATH FOL SIL TEMP 10 16FR

## (undated) DEVICE — CANNULA SOFT FLOW ANG 14IN 21F

## (undated) DEVICE — GLOVE PROTEXIS PI SYN SURG 7.5

## (undated) DEVICE — TIP SUCTION YANKAUER

## (undated) DEVICE — CARTRIDGE SILV 4CHAN 2.0-3.5MG

## (undated) DEVICE — CARTRIDGE HEPARIN 2 CHNNL ACT

## (undated) DEVICE — KIT CATHGARD DBL LUMN 9FRX11.5

## (undated) DEVICE — HEMOSTAT SURGICEL NU-KNIT 6X9

## (undated) DEVICE — DRESSING TELFA + RECT 6X10IN

## (undated) DEVICE — CANNULA AORT ROOT VNT LN 14GA

## (undated) DEVICE — HOLDER STRIP-T SELF ADH 2X10IN

## (undated) DEVICE — COVER PROBE US 5.5X58L NON LTX

## (undated) DEVICE — CARTRIDGE HEPARIN DOSE

## (undated) DEVICE — ELECTRODE BLADE INSULATED 1 IN

## (undated) DEVICE — SOL PLASMALYTE PH 7.4 1000ML

## (undated) DEVICE — CATH RED RUBBER LATEX 22F 16IN

## (undated) DEVICE — SEE MEDLINE ITEM 159606

## (undated) DEVICE — COR-KNOT MINI DEVICE

## (undated) DEVICE — SOL .9NACL PF 100 ML

## (undated) DEVICE — TAPE UMBILICAL 1/8X36IN WHITE

## (undated) DEVICE — SENSOR LOW LEVEL OXYGEN

## (undated) DEVICE — KIT C.A.T.S. FAST START 4/CASE

## (undated) DEVICE — SUT PROLENE 4-0 SH BLU 36IN

## (undated) DEVICE — PLEDGET TFLN FELT 9.5X4.8 ST

## (undated) DEVICE — SUT 2/0 36IN ETHIBOND EXCE

## (undated) DEVICE — GLOVE PROTEXIS BLUE LATEX 7

## (undated) DEVICE — BLADE SURG #15 CARBON STEEL

## (undated) DEVICE — WIRE INTRAMYOCARDIAL TEMP

## (undated) DEVICE — ELECTRODE UTAHLOOP EXT 10CM

## (undated) DEVICE — SOL ELECTROLYTE PH 7.4 500ML

## (undated) DEVICE — CATH IMPULSE 5F 100CM FR4

## (undated) DEVICE — GAUZE VISTEC XR DTECT 16 4X4IN

## (undated) DEVICE — CATH ALL PUR URTHL 20FR

## (undated) DEVICE — DRESSING TRANS 4X4 TEGADERM

## (undated) DEVICE — TUBE SUCTION MEDI-VAC STERILE

## (undated) DEVICE — CATH MULTIPAK JL4 3DRC PIG

## (undated) DEVICE — SUT MONOCRYL PLUS UD 3-0 27

## (undated) DEVICE — SUT PROLENE 3-0 SH DA 36 BL

## (undated) DEVICE — SET ANGIO ACIST CVI ANGIOTOUCH

## (undated) DEVICE — SUT 2 30IN SILK BLK BRAIDE

## (undated) DEVICE — SHEATH PINNACLE INTRO .035 4FR

## (undated) DEVICE — STOPCOCK 4-WAY

## (undated) DEVICE — ADAPTER DLP Y PERF 3.5 &10IN

## (undated) DEVICE — PACK VARISPREED BATTERY

## (undated) DEVICE — GUIDEWIRE INQWIRE SE 3MM JTIP

## (undated) DEVICE — CONNECTOR 3/8X3/8 STRAIGHT

## (undated) DEVICE — DRESSING ISLAND TELFA 4X5IN

## (undated) DEVICE — SOL IRRI STRL WATER 1000ML

## (undated) DEVICE — SOL IRR NACL .9% 3000ML

## (undated) DEVICE — CANNULA NASAL ADULT

## (undated) DEVICE — GLOVE PROTEXIS HYDROGEL SZ6.5

## (undated) DEVICE — DRAPE SLUSH WARMER WITH DISC

## (undated) DEVICE — DRESSING TELFA + BARR 4X6IN

## (undated) DEVICE — SUT BONE WAX 2.5 GRMS 12/BX

## (undated) DEVICE — SUT MAXON GRN 0 CLSR 27IN

## (undated) DEVICE — SOL LAC RINGERS 1000ML INJ

## (undated) DEVICE — KIT SURGICAL COLON .25 1.1OZ

## (undated) DEVICE — GLOVE BIOGEL 7.5

## (undated) DEVICE — SEALANT VISTASEAL FIBRIN 10ML